# Patient Record
Sex: MALE | Race: WHITE | NOT HISPANIC OR LATINO | Employment: FULL TIME | URBAN - METROPOLITAN AREA
[De-identification: names, ages, dates, MRNs, and addresses within clinical notes are randomized per-mention and may not be internally consistent; named-entity substitution may affect disease eponyms.]

---

## 2017-03-27 DIAGNOSIS — Z85.830 HISTORY OF EWING'S SARCOMA: ICD-10-CM

## 2017-03-27 DIAGNOSIS — E78.01 FAMILIAL HYPERCHOLESTEROLEMIA: ICD-10-CM

## 2017-03-27 DIAGNOSIS — F33.0 MAJOR DEPRESSIVE DISORDER, RECURRENT EPISODE, MILD (H): ICD-10-CM

## 2017-03-27 LAB
BASOPHILS # BLD AUTO: 0 10E9/L (ref 0–0.2)
BASOPHILS NFR BLD AUTO: 0.3 %
DIFFERENTIAL METHOD BLD: ABNORMAL
EOSINOPHIL # BLD AUTO: 0.1 10E9/L (ref 0–0.7)
EOSINOPHIL NFR BLD AUTO: 0.9 %
ERYTHROCYTE [DISTWIDTH] IN BLOOD BY AUTOMATED COUNT: 12.2 % (ref 10–15)
HCT VFR BLD AUTO: 40.2 % (ref 40–53)
HGB BLD-MCNC: 13.9 G/DL (ref 13.3–17.7)
LYMPHOCYTES # BLD AUTO: 1.6 10E9/L (ref 0.8–5.3)
LYMPHOCYTES NFR BLD AUTO: 24.1 %
MCH RBC QN AUTO: 32.3 PG (ref 26.5–33)
MCHC RBC AUTO-ENTMCNC: 34.6 G/DL (ref 31.5–36.5)
MCV RBC AUTO: 93 FL (ref 78–100)
MONOCYTES # BLD AUTO: 0.7 10E9/L (ref 0–1.3)
MONOCYTES NFR BLD AUTO: 9.6 %
NEUTROPHILS # BLD AUTO: 4.4 10E9/L (ref 1.6–8.3)
NEUTROPHILS NFR BLD AUTO: 65.1 %
PLATELET # BLD AUTO: 221 10E9/L (ref 150–450)
RBC # BLD AUTO: 4.31 10E12/L (ref 4.4–5.9)
WBC # BLD AUTO: 6.8 10E9/L (ref 4–11)

## 2017-03-27 PROCEDURE — 80050 GENERAL HEALTH PANEL: CPT | Performed by: FAMILY MEDICINE

## 2017-03-27 PROCEDURE — 36415 COLL VENOUS BLD VENIPUNCTURE: CPT | Performed by: FAMILY MEDICINE

## 2017-03-27 PROCEDURE — 80061 LIPID PANEL: CPT | Performed by: FAMILY MEDICINE

## 2017-03-28 LAB
ALBUMIN SERPL-MCNC: 4.8 G/DL (ref 3.4–5)
ALP SERPL-CCNC: 49 U/L (ref 40–150)
ALT SERPL W P-5'-P-CCNC: 34 U/L (ref 0–70)
ANION GAP SERPL CALCULATED.3IONS-SCNC: 14 MMOL/L (ref 3–14)
AST SERPL W P-5'-P-CCNC: 17 U/L (ref 0–45)
BILIRUB SERPL-MCNC: 0.5 MG/DL (ref 0.2–1.3)
BUN SERPL-MCNC: 20 MG/DL (ref 7–30)
CALCIUM SERPL-MCNC: 9.4 MG/DL (ref 8.5–10.1)
CHLORIDE SERPL-SCNC: 105 MMOL/L (ref 94–109)
CHOLEST SERPL-MCNC: 247 MG/DL
CO2 SERPL-SCNC: 22 MMOL/L (ref 20–32)
CREAT SERPL-MCNC: 1.12 MG/DL (ref 0.66–1.25)
GFR SERPL CREATININE-BSD FRML MDRD: 76 ML/MIN/1.7M2
GLUCOSE SERPL-MCNC: 68 MG/DL (ref 70–99)
HDLC SERPL-MCNC: 56 MG/DL
LDLC SERPL CALC-MCNC: 157 MG/DL
NONHDLC SERPL-MCNC: 191 MG/DL
POTASSIUM SERPL-SCNC: 3.8 MMOL/L (ref 3.4–5.3)
PROT SERPL-MCNC: 8 G/DL (ref 6.8–8.8)
SODIUM SERPL-SCNC: 141 MMOL/L (ref 133–144)
TRIGL SERPL-MCNC: 171 MG/DL
TSH SERPL DL<=0.005 MIU/L-ACNC: 1.52 MU/L (ref 0.4–4)

## 2017-03-29 ENCOUNTER — MYC MEDICAL ADVICE (OUTPATIENT)
Dept: FAMILY MEDICINE | Facility: CLINIC | Age: 32
End: 2017-03-29

## 2017-03-29 NOTE — PROGRESS NOTES
Dear Agapito,    Your recent labs were normal, other than elevated LDL cholesterol, triglycerides, and total cholesterol. Have you been taking your Lipitor daily? Are you in need of refills on your medication?  A heart-healthy diet, regular exercise, and weight control is recommended in addition to your cholesterol medication.    Please contact the clinic if you have additional questions.  Thank you.    Sincerely,    Sophia Ch PA-C  Physician extender for Dr. Karen Weiler

## 2017-04-10 ENCOUNTER — MYC MEDICAL ADVICE (OUTPATIENT)
Dept: FAMILY MEDICINE | Facility: CLINIC | Age: 32
End: 2017-04-10

## 2017-04-10 DIAGNOSIS — R21 RASH: Primary | ICD-10-CM

## 2017-05-09 DIAGNOSIS — E78.01 FAMILIAL HYPERCHOLESTEROLEMIA: Primary | ICD-10-CM

## 2017-05-09 NOTE — TELEPHONE ENCOUNTER
atorvastatin (LIPITOR) 40 MG tablet     Last Written Prescription Date: 11/11/2016  Last Fill Quantity: ?, # refills: 3  Last Office Visit with G, P or [facility] prescribing provider: 12/8/2016  Next 5 appointments (look out 90 days)     May 20, 2017  9:00 AM CDT   Office Visit with Cristiana Hernandez MD   [facility] - [facility] Skin ([facility] )    [address] [place] [id number] [phone]                   Lab Results   Component Value Date    CHOL 247 03/27/2017     Lab Results   Component Value Date    HDL 56 03/27/2017     Lab Results   Component Value Date     03/27/2017     Lab Results   Component Value Date    TRIG 171 03/27/2017     No results found for: CHOLHDLRATIO    Routing refill request to provider for review/approval because:  Medication is reported/historical

## 2017-05-10 RX ORDER — ATORVASTATIN CALCIUM 40 MG/1
40 TABLET, FILM COATED ORAL DAILY
Qty: 90 TABLET | Refills: 3 | Status: SHIPPED | OUTPATIENT
Start: 2017-05-10 | End: 2018-05-30

## 2017-05-20 ENCOUNTER — OFFICE VISIT (OUTPATIENT)
Dept: FAMILY MEDICINE | Facility: CLINIC | Age: 32
End: 2017-05-20
Payer: COMMERCIAL

## 2017-05-20 DIAGNOSIS — L30.9 DERMATITIS: ICD-10-CM

## 2017-05-20 DIAGNOSIS — L30.9 ECZEMA, UNSPECIFIED TYPE: Primary | ICD-10-CM

## 2017-05-20 LAB
KOH PREP SPEC: NORMAL
MICRO REPORT STATUS: NORMAL
SPECIMEN SOURCE: NORMAL

## 2017-05-20 PROCEDURE — 87220 TISSUE EXAM FOR FUNGI: CPT | Performed by: FAMILY MEDICINE

## 2017-05-20 PROCEDURE — 99214 OFFICE O/P EST MOD 30 MIN: CPT | Performed by: FAMILY MEDICINE

## 2017-05-20 RX ORDER — BETAMETHASONE DIPROPIONATE 0.05 %
OINTMENT (GRAM) TOPICAL
Qty: 45 G | Refills: 0 | Status: SHIPPED | OUTPATIENT
Start: 2017-05-20 | End: 2017-11-22

## 2017-05-20 NOTE — MR AVS SNAPSHOT
After Visit Summary   5/20/2017    Agapito Yu    MRN: [id number]           Patient Information     Date Of Birth          1985        Visit Information        Provider Department      5/20/2017 9:00 AM Cristiana Hernandez MD [facility]        Today's Diagnoses     Eczema, unspecified type    -  1    Dermatitis          Care Instructions    DRY SKIN INSTRUCTIONS  Routine use of moisturizer is important for healthy, resilient skin.    Twice daily use of a moisturizer such as over-the-counter (OTC) CeraVe moisturizer cream (in the jar) or OTC Cetaphil RestoraDerm moisturizer. These contain ceramides and filaggrin proteins that can help to maintain the body's moisture layer.    Twice daily use of a moisturizer such as OTC Vanicream or OTC Aquaphor.    Always apply moisturizer after washing, within 3 minutes of drying off for best effect.    Do not overuse soap. Just apply soap on the groin and armpits, unless you have been sweating extensively. Recommended products include OTC unscented Dove sensitive skin or OTC Vanicream cleansing bar.    Good facial cleansers include OTC CeraVe hydrating facial cleanser or OTC Cetaphil daily facial cleanser.    Avoid use of scented products and/or antistatic dryer sheets.  Betamethasone diproprinate 0.05% ointment - apply two times per day to the affected areas on the arms. Not for face or groin    Recheck if needed        Follow-ups after your visit        Who to contact     If you have questions or need follow up information about today's clinic visit or your schedule please contact [facility] SK[facility] directly at [phone].  Normal or non-critical lab and imaging results will be communicated to you by MyChart, letter or phone within 4 business days after the clinic has received the results. If you do not hear from us within 7 days, please contact the clinic through MyChart or phone. If you have a critical  or abnormal lab result, we will notify you by phone as soon as possible.  Submit refill requests through Zaiseoul or call your pharmacy and they will forward the refill request to us. Please allow 3 business days for your refill to be completed.          Additional Information About Your Visit        PeakÂ®hart Information     Zaiseoul gives you secure access to your electronic health record. If you see a primary care provider, you can also send messages to your care team and make appointments. If you have questions, please call your primary care clinic.  If you do not have a primary care provider, please call [phone] and they will assist you.        Care EveryWhere ID     This is your Care EveryWhere ID. This could be used by other organizations to access your [facility] medical records  [id number]         Blood Pressure from Last 3 Encounters:   12/08/16 112/60   11/28/16 108/72    Weight from Last 3 Encounters:   12/08/16 191 lb (86.6 kg)   11/28/16 191 lb 9.6 oz (86.9 kg)              We Performed the Following     KOH skin hair or nails          Today's Medication Changes          These changes are accurate as of: 5/20/17  9:29 AM.  If you have any questions, ask your nurse or doctor.               Start taking these medicines.        Dose/Directions    betamethasone dipropionate 0.05 % ointment   Commonly known as:  DIPROSONE   Used for:  Eczema, unspecified type   Started by:  Cristiana Hernandez MD        Apply sparingly to affected area on arms , legs or trunk  twice daily for 10-14 days  Do not apply to face.   Quantity:  45 g   Refills:  0            Where to get your medicines      These medications were sent to [facility] [address]  [place], [place] - [address]  [address] [facility] [address]     Phone:  [phone]     betamethasone dipropionate 0.05 % ointment                Primary Care Provider Office Phone # Fax #    Karen Weiler, [phone]       [place]  [facility] [address] [facility] [address]        Thank you!     Thank you for choosing [facility] PRIMARY CARE SK[facility]  for your care. Our goal is always to provide you with excellent care. Hearing back from our patients is one way we can continue to improve our services. Please take a few minutes to complete the written survey that you may receive in the mail after your visit with us. Thank you!             Your Updated Medication List - Protect others around you: Learn how to safely use, store and throw away your medicines at www.disposemymeds.org.          This list is accurate as of: 5/20/17  9:29 AM.  Always use your most recent med list.                   Brand Name Dispense Instructions for use    atorvastatin 40 MG tablet    LIPITOR    90 tablet    Take 1 tablet (40 mg) by mouth daily       augmented betamethasone dipropionate 0.05 % cream    DIPROLENE-AF    15 g    Apply sparingly to affected area  twice daily for 14 days.  Do not apply to face.       betamethasone dipropionate 0.05 % ointment    DIPROSONE    45 g    Apply sparingly to affected area on arms , legs or trunk  twice daily for 10-14 days  Do not apply to face.       clotrimazole 1 % cream    LOTRIMIN    15 g    Apply topically 2 times daily       VIAGRA 50 MG cap/tab   Generic drug:  sildenafil      TK SS TO ONE T PO PRN - ONE HOUR BEFORE SEXUAL ACTIVITY       * ZOLOFT PO      Take by mouth daily Probably is Zoloft       * sertraline 100 MG tablet    ZOLOFT    90 tablet    Take 1 tablet (100 mg) by mouth daily       * Notice:  This list has 2 medication(s) that are the same as other medications prescribed for you. Read the directions carefully, and ask your doctor or other care provider to review them with you.

## 2017-05-20 NOTE — PROGRESS NOTES
[facility] - PRIMARY CARE SKIN    CC : Rash   SUBJECTIVE:                                                    Agapito Yu is a 31 year old male who presents to clinic today with wife because of a(n) itchy rash beginning 6 months ago on the ams, and spreading to the chest wall.      Pruritic : itcy  Symptoms appear to be : not changing over the course of time.  Aggravating factors : none.  Relieving factors : betamethasone diproprinate 0.05% helped slightly - 2 weeks use once per day; previous antifungal cream- made no difference.    Previous similar history : No.  Recent exposure history : none known   Any other family members with similar symptoms : No.    Therapies tried : hydrocortisone   Products used : moisturizers , not used daily    Personal Medical History  Skin Cancer : NO  Eczema Psoriasis Rosacea Autoimmune   NO NO NO NO     Family Medical History  Skin Cancer : GM - nonmelanoma  Eczema Psoriasis Rosacea Autoimmune   NO NO NO NO     Occupation : IT (    Patient Active Problem List   Diagnosis     Familial hypercholesterolemia     History of Parker's sarcoma     Major depressive disorder, recurrent episode, mild (H)       History reviewed. No pertinent past medical history. Past Surgical History:   Procedure Laterality Date     SURGICAL HISTORY OF -       Rt. 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      Social History   Substance Use Topics     Smoking status: Never Smoker     Smokeless tobacco: Never Used     Alcohol use 0.0 oz/week     0 Standard drinks or equivalent per week      Comment: 12 drinks per week     Family History     Problem (# of Occurrences) Relation (Name,Age of Onset)    Hyperlipidemia (3) Father, Brother, Brother           Prescription Medications as of 5/20/2017             atorvastatin (LIPITOR) 40 MG tablet Take 1 tablet (40 mg) by mouth daily    VIAGRA 50 MG cap/tab TK SS TO ONE T PO PRN - ONE HOUR BEFORE SEXUAL ACTIVITY     sertraline (ZOLOFT) 100 MG tablet Take 1 tablet (100 mg) by mouth daily    Sertraline HCl (ZOLOFT PO) Take by mouth daily Probably is Zoloft    augmented betamethasone dipropionate (DIPROLENE-AF) 0.05 % cream Apply sparingly to affected area  twice daily for 14 days.  Do not apply to face.    clotrimazole (LOTRIMIN) 1 % cream Apply topically 2 times daily          No Known Allergies         ROS : 14 point review of systems was negative except the symptoms listed above in the HPI.    This document serves as a record of the services and decisions personally performed and made by Katie Hernandez MD. It was created on her behalf by Reid Hernandez, a trained medical scribe.  The creation of this document is based on the scribe's personal observations and the provider's statements to the medical scribe.  Reid Hernandez, May 20, 2017 8:57 AM      OBJECTIVE:                                                    GENERAL: healthy, alert and no distress  SKIN: Lopez Skin Type - III.  Face, Neck, Trunk, Arms, Hands and Fingers were examined. The dermatoscope was used to help evaluate pigmented lesions.  Skin Pertinent Findings:  Arms, lateral chest wall- scattered erythematous maculopapular eruption patches, scaly                          Face, legs, trunk- clear  Diagnostic Test Results:  Results for orders placed or performed in visit on 05/20/17 (from the past 24 hour(s))   KOH skin hair or nails   Result Value Ref Range    Specimen Description Skin     KOH Skin Hair Nails Test No fungal elements seen     Micro Report Status FINAL 05/20/2017              ASSESSMENT:                                                      Encounter Diagnoses   Name Primary?     Eczema, unspecified type Yes     Dermatitis            PLAN:                                                    Patient Instructions   DRY SKIN INSTRUCTIONS  Routine use of moisturizer is important for healthy, resilient skin.    Twice daily use of a moisturizer such as  over-the-counter (OTC) CeraVe moisturizer cream (in the jar) or OTC Cetaphil RestoraDerm moisturizer. These contain ceramides and filaggrin proteins that can help to maintain the body's moisture layer.    Twice daily use of a moisturizer such as OTC Vanicream or OTC Aquaphor.    Always apply moisturizer after washing, within 3 minutes of drying off for best effect.    Do not overuse soap. Just apply soap on the groin and armpits, unless you have been sweating extensively. Recommended products include OTC unscented Dove sensitive skin or OTC Vanicream cleansing bar.    Good facial cleansers include OTC CeraVe hydrating facial cleanser or OTC Cetaphil daily facial cleanser.    Avoid use of scented products and/or antistatic dryer sheets.  Betamethasone diproprinate 0.05% ointment - apply two times per day to the affected areas on the arms. Not for face or groin    Recheck if needed        The patient was counseled to use products free of fragrance, dyes, and plants. The importance of using bland cleansers and the regular use of heavy bland emollient creams was impressed upon the patient.      PROCEDURES:                                                    None.    TT : 25 minutes.  CT : 20 minutes.      The information in this document, created by the medical scribe for me, accurately reflects the services I personally performed and the decisions made by me. I have reviewed and approved this document for accuracy prior to leaving the patient care area.  Katie Hernandez MD May 20, 2017 8:57 AM  [facility] - PRIMARY CARE SKIN

## 2017-05-20 NOTE — PATIENT INSTRUCTIONS
DRY SKIN INSTRUCTIONS  Routine use of moisturizer is important for healthy, resilient skin.    Twice daily use of a moisturizer such as over-the-counter (OTC) CeraVe moisturizer cream (in the jar) or OTC Cetaphil RestoraDerm moisturizer. These contain ceramides and filaggrin proteins that can help to maintain the body's moisture layer.    Twice daily use of a moisturizer such as OTC Vanicream or OTC Aquaphor.    Always apply moisturizer after washing, within 3 minutes of drying off for best effect.    Do not overuse soap. Just apply soap on the groin and armpits, unless you have been sweating extensively. Recommended products include OTC unscented Dove sensitive skin or OTC Vanicream cleansing bar.    Good facial cleansers include OTC CeraVe hydrating facial cleanser or OTC Cetaphil daily facial cleanser.    Avoid use of scented products and/or antistatic dryer sheets.  Betamethasone diproprinate 0.05% ointment - apply two times per day to the affected areas on the arms. Not for face or groin    Recheck if needed

## 2017-06-21 ENCOUNTER — NURSE TRIAGE (OUTPATIENT)
Dept: NURSING | Facility: CLINIC | Age: 32
End: 2017-06-21

## 2017-06-21 ENCOUNTER — MYC MEDICAL ADVICE (OUTPATIENT)
Dept: FAMILY MEDICINE | Facility: CLINIC | Age: 32
End: 2017-06-21

## 2017-06-21 DIAGNOSIS — G47.09 OTHER INSOMNIA: Primary | ICD-10-CM

## 2017-06-21 RX ORDER — ZOLPIDEM TARTRATE 10 MG/1
10 TABLET ORAL
Qty: 30 TABLET | Refills: 1 | Status: SHIPPED | OUTPATIENT
Start: 2017-06-21 | End: 2017-09-11

## 2017-06-21 NOTE — TELEPHONE ENCOUNTER
"  Reason for Disposition    [1] Prescription not at pharmacy AND [2] was prescribed today by PCP     [facility] pharmacy calling to check on Ambien Rx.  Advised that there is an Rx written by Dr. Weiler today and documentation indicating that she called into [facility] pharmacy.    Pharmacist Maddie states they have not received a verbal order for the Ambien from Dr. Weiler.  I inquired if there is possibly a doctor line that messages have not been checked yet.  Pharmacist will check doctor line.  Pharmacist is unable to take verbal from this RN as I do not have a [facility] number for Dr. Weiler.  If no voice message left from Dr. Weiler with verbal order, pharmacist will contact clinic in morning.    Additional Information    Negative: Drug overdose and nurse unable to answer question    Negative: Caller requesting information not related to medicine    Negative: Caller requesting a prescription for Strep throat and has a positive culture result    Negative: Rash while taking a medication or within 3 days of stopping it    Negative: Immunization reaction suspected    Negative: [1] Asthma and [2] having symptoms of asthma (cough, wheezing, etc)    Negative: MORE THAN A DOUBLE DOSE of a prescription or over-the-counter (OTC) drug    Negative: [1] DOUBLE DOSE (an extra dose or lesser amount) of over-the-counter (OTC) drug AND [2] any symptoms (e.g., dizziness, nausea, pain, sleepiness)    Negative: [1] DOUBLE DOSE (an extra dose or lesser amount) of prescription drug AND [2] any symptoms (e.g., dizziness, nausea, pain, sleepiness)    Negative: Took another person's prescription drug    Negative: [1] DOUBLE DOSE (an extra dose or lesser amount) of prescription drug AND [2] NO symptoms (Exception: a double dose of antibiotics)    Negative: Diabetes drug error or overdose (e.g., insulin or extra dose)    Negative: [1] Request for URGENT new prescription or refill of \"essential\" medication (i.e., likelihood of harm to " patient if not taken) AND [2] triager unable to fill per unit policy    Protocols used: MEDICATION QUESTION CALL-ADULT-      Krysta Cat RN  [facility]

## 2017-06-22 ENCOUNTER — TELEPHONE (OUTPATIENT)
Dept: FAMILY MEDICINE | Facility: CLINIC | Age: 32
End: 2017-06-22

## 2017-06-22 NOTE — TELEPHONE ENCOUNTER
[facility] calling asking about the ambien script (where is it?)  - pierre RUBIN just faxed it over     Advised of the above     Stephanie Drew RN, BSN  [facility]

## 2017-09-11 DIAGNOSIS — G47.09 OTHER INSOMNIA: ICD-10-CM

## 2017-09-13 RX ORDER — ZOLPIDEM TARTRATE 10 MG/1
10 TABLET ORAL
Qty: 30 TABLET | Refills: 1 | Status: SHIPPED | OUTPATIENT
Start: 2017-09-13 | End: 2017-11-22

## 2017-10-31 ENCOUNTER — MYC MEDICAL ADVICE (OUTPATIENT)
Dept: FAMILY MEDICINE | Facility: CLINIC | Age: 32
End: 2017-10-31

## 2017-10-31 DIAGNOSIS — R07.9 CHEST PAIN, UNSPECIFIED TYPE: ICD-10-CM

## 2017-10-31 DIAGNOSIS — Z92.21 STATUS POST CHEMOTHERAPY: Primary | ICD-10-CM

## 2017-11-01 NOTE — TELEPHONE ENCOUNTER
Dr. Weiler please see below and advise. Please advise if you would prefer virtual visit or other. Thank you, Shaista Holm R.N.

## 2017-11-16 ENCOUNTER — HOSPITAL ENCOUNTER (OUTPATIENT)
Dept: CARDIOLOGY | Facility: CLINIC | Age: 32
Discharge: HOME | End: 2017-11-16
Attending: FAMILY MEDICINE | Admitting: FAMILY MEDICINE
Payer: COMMERCIAL

## 2017-11-16 DIAGNOSIS — R07.9 CHEST PAIN, UNSPECIFIED TYPE: ICD-10-CM

## 2017-11-16 DIAGNOSIS — Z92.21 STATUS POST CHEMOTHERAPY: ICD-10-CM

## 2017-11-16 PROCEDURE — 93306 TTE W/DOPPLER COMPLETE: CPT | Mod: 26 | Performed by: INTERNAL MEDICINE

## 2017-11-16 PROCEDURE — 25500064 ZZH RX 255 OP 636: Performed by: FAMILY MEDICINE

## 2017-11-16 PROCEDURE — 40000264 ECHO COMPLETE WITH OPTISON

## 2017-11-16 RX ADMIN — HUMAN ALBUMIN MICROSPHERES AND PERFLUTREN 5 ML: 10; .22 INJECTION, SOLUTION INTRAVENOUS at 08:45

## 2017-11-17 ENCOUNTER — MYC MEDICAL ADVICE (OUTPATIENT)
Dept: FAMILY MEDICINE | Facility: CLINIC | Age: 32
End: 2017-11-17

## 2017-11-17 DIAGNOSIS — R93.1 ABNORMAL ECHOCARDIOGRAM: ICD-10-CM

## 2017-11-17 DIAGNOSIS — Z85.830 HISTORY OF EWING'S SARCOMA: Primary | ICD-10-CM

## 2017-11-21 NOTE — TELEPHONE ENCOUNTER
Spoke to patient. Discussed echo results. Echo showed reduced ejection fraction of 40-45%. Recommended follow-up with Cardio-Oncology clinic.  Referral done. Patient to call and make an appointment.    Karen Weiler, MD

## 2017-11-22 ENCOUNTER — HOSPITAL ENCOUNTER (OUTPATIENT)
Dept: CT IMAGING | Facility: CLINIC | Age: 32
Discharge: HOME | End: 2017-11-22
Attending: SURGERY | Admitting: SURGERY
Payer: COMMERCIAL

## 2017-11-22 ENCOUNTER — OFFICE VISIT (OUTPATIENT)
Dept: SURGERY | Facility: CLINIC | Age: 32
End: 2017-11-22
Payer: COMMERCIAL

## 2017-11-22 ENCOUNTER — OFFICE VISIT (OUTPATIENT)
Dept: FAMILY MEDICINE | Facility: CLINIC | Age: 32
End: 2017-11-22
Payer: COMMERCIAL

## 2017-11-22 VITALS
WEIGHT: 201 LBS | HEART RATE: 100 BPM | BODY MASS INDEX: 29.77 KG/M2 | TEMPERATURE: 98.1 F | OXYGEN SATURATION: 100 % | DIASTOLIC BLOOD PRESSURE: 86 MMHG | SYSTOLIC BLOOD PRESSURE: 128 MMHG | HEIGHT: 69 IN

## 2017-11-22 VITALS
HEIGHT: 69 IN | DIASTOLIC BLOOD PRESSURE: 76 MMHG | HEART RATE: 85 BPM | BODY MASS INDEX: 29.77 KG/M2 | SYSTOLIC BLOOD PRESSURE: 116 MMHG | WEIGHT: 201 LBS

## 2017-11-22 DIAGNOSIS — F32.A MILD DEPRESSION: ICD-10-CM

## 2017-11-22 DIAGNOSIS — R10.32 LEFT GROIN PAIN: Primary | ICD-10-CM

## 2017-11-22 DIAGNOSIS — R10.32 LEFT GROIN PAIN: ICD-10-CM

## 2017-11-22 DIAGNOSIS — Z51.81 MEDICATION MONITORING ENCOUNTER: ICD-10-CM

## 2017-11-22 DIAGNOSIS — K40.90 LEFT INGUINAL HERNIA: Primary | ICD-10-CM

## 2017-11-22 DIAGNOSIS — Z85.830 HISTORY OF EWING'S SARCOMA: ICD-10-CM

## 2017-11-22 PROCEDURE — 99214 OFFICE O/P EST MOD 30 MIN: CPT | Performed by: FAMILY MEDICINE

## 2017-11-22 PROCEDURE — 99204 OFFICE O/P NEW MOD 45 MIN: CPT | Performed by: SURGERY

## 2017-11-22 PROCEDURE — 25000125 ZZHC RX 250: Performed by: SURGERY

## 2017-11-22 PROCEDURE — 74177 CT ABD & PELVIS W/CONTRAST: CPT

## 2017-11-22 PROCEDURE — 25000128 H RX IP 250 OP 636: Performed by: SURGERY

## 2017-11-22 RX ORDER — CEPHALEXIN 500 MG/1
500 CAPSULE ORAL 3 TIMES DAILY
Qty: 30 CAPSULE | Refills: 0 | Status: SHIPPED | OUTPATIENT
Start: 2017-11-22 | End: 2017-12-02

## 2017-11-22 RX ORDER — IOPAMIDOL 755 MG/ML
98 INJECTION, SOLUTION INTRAVASCULAR ONCE
Status: COMPLETED | OUTPATIENT
Start: 2017-11-22 | End: 2017-11-22

## 2017-11-22 RX ADMIN — SODIUM CHLORIDE 69 ML: 9 INJECTION, SOLUTION INTRAVENOUS at 15:27

## 2017-11-22 RX ADMIN — IOPAMIDOL 98 ML: 755 INJECTION, SOLUTION INTRAVENOUS at 15:26

## 2017-11-22 NOTE — NURSING NOTE
Patient returned after CT scan.  Informed pt of results and Dr. Devine's recommendations to follow up with PCP.  No evidence of hernia.  Some inflammation, possible infection.  Order for a 10 day course of keflex sent to patient's pharmacy of choice.    IV removed without any complications prior to patient leaving the clinic.    Miracle Manzanares RN

## 2017-11-22 NOTE — MR AVS SNAPSHOT
After Visit Summary   11/22/2017    Agapito Yu    MRN: [id number]           Patient Information     Date Of Birth          1985        Visit Information        Provider Department      11/22/2017 1:30 PM Levi Devine MD [facility]      Today's Diagnoses     Left groin pain    -  1      Care Instructions    Your CT abd/pelvis is scheduled for today at 3:00 pm          Follow-ups after your visit        Your next 10 appointments already scheduled     Nov 22, 2017  3:00 PM CST   CT ABDOMEN PELVIS W/O CONTRAST with SHCT1   [facility] ([facility])    [address] [id number] [phone]           Please bring any scans or X-rays taken at other hospitals, if similar tests were done. Also bring a list of your medicines, including vitamins, minerals and over-the-counter drugs. It is safest to leave personal items at home.  Be sure to tell your doctor:   If you have any allergies.   If there s any chance you are pregnant.   If you are breastfeeding.   If you have any special needs.  You may have contrast for this exam. To prepare:   Do not eat or drink for 2 hours before your exam. If you need to take medicine, you may take it with small sips of water. (We may ask you to take liquid medicine as well.)   The day before your exam, drink extra fluids at least six 8-ounce glasses (unless your doctor tells you to restrict your fluids).  Patients over 70 or patients with diabetes or kidney problems:   If you haven t had a blood test (creatinine test) within the last 30 days, go to your clinic or Diagnostic Imaging Department for this test.  If you have diabetes:   If your kidney function is normal, continue taking your metformin (Avandamet, Glucophage, Glucovance, Metaglip) on the day of your exam.   If your kidney function is abnormal, wait 48 hours before restarting this medicine.  You  will have oral contrast for this exam:   You will drink the contrast at home. Get this from your clinic or Diagnostic Imaging Department. Please follow the directions given.  Please wear loose clothing, such as a sweat suit or jogging clothes. Avoid snaps, zippers and other metal. We may ask you to undress and put on a hospital gown.  If you have any questions, please call the Imaging Department where you will have your exam.            Dec 07, 2017 11:15 AM CST   Cardio-Oncology New with Maia Hernandez DO   [facility] ([facility])    [address]  [facility] [id number] [phone]              Future tests that were ordered for you today     Open Future Orders        Priority Expected Expires Ordered    CT Abdomen Pelvis w/o Contrast STAT 11/22/2017 11/22/2018 11/22/2017            Who to contact     If you have questions or need follow up information about today's clinic visit or your schedule please contact [facility] directly at [phone].  Normal or non-critical lab and imaging results will be communicated to you by HIT Communityhart, letter or phone within 4 business days after the clinic has received the results. If you do not hear from us within 7 days, please contact the clinic through Axiomaticst or phone. If you have a critical or abnormal lab result, we will notify you by phone as soon as possible.  Submit refill requests through CardStar or call your pharmacy and they will forward the refill request to us. Please allow 3 business days for your refill to be completed.          Additional Information About Your Visit        CardStar Information     CardStar gives you secure access to your electronic health record. If you see a primary care provider, you can also send messages to your care team and make appointments. If you have questions, please call your primary care clinic.  If you do not have a primary care provider,  "please call [phone] and they will assist you.        Care EveryWhere ID     This is your Care EveryWhere ID. This could be used by other organizations to access your [place] medical records  [id number]        Your Vitals Were     Pulse Height BMI (Body Mass Index)             85 5' 8.75\" (1.746 m) 29.9 kg/m2          Blood Pressure from Last 3 Encounters:   11/22/17 116/76   11/22/17 128/86   12/08/16 112/60    Weight from Last 3 Encounters:   11/22/17 201 lb (91.2 kg)   11/22/17 201 lb (91.2 kg)   12/08/16 191 lb (86.6 kg)                 Today's Medication Changes          These changes are accurate as of: 11/22/17  2:07 PM.  If you have any questions, ask your nurse or doctor.               These medicines have changed or have updated prescriptions.        Dose/Directions    sertraline 100 MG tablet   Commonly known as:  ZOLOFT   This may have changed:  Another medication with the same name was removed. Continue taking this medication, and follow the directions you see here.   Used for:  Major depressive disorder, recurrent episode, mild (H)   Changed by:  Weiler, Karen, MD        Dose:  100 mg   Take 1 tablet (100 mg) by mouth daily   Quantity:  90 tablet   Refills:  3                Primary Care Provider Office Phone # Fax #    Ed Flores [phone] [address] [facility] [address]        Equal Access to Services     [facility] AH: Hadii kevon zamora hadasho Soeric, waaxda luqadaha, qaybta kaalmada shalaegyada, roger edouard. So [facility] [phone].    ATENCIÓN: Si habla español, tiene a berger disposición servicios gratuitos de asistencia lingüística. Daniel al [phone].    We comply with applicable federal civil rights laws and [place] laws. We do not discriminate on the basis of race, color, national origin, age, disability, sex, sexual orientation, or gender identity.            Thank you!     Thank you for choosing [facility]" for your care. Our goal is always to provide you with excellent care. Hearing back from our patients is one way we can continue to improve our services. Please take a few minutes to complete the written survey that you may receive in the mail after your visit with us. Thank you!             Your Updated Medication List - Protect others around you: Learn how to safely use, store and throw away your medicines at www.disposemymeds.org.          This list is accurate as of: 11/22/17  2:07 PM.  Always use your most recent med list.                   Brand Name Dispense Instructions for use Diagnosis    atorvastatin 40 MG tablet    LIPITOR    90 tablet    Take 1 tablet (40 mg) by mouth daily    Familial hypercholesterolemia       sertraline 100 MG tablet    ZOLOFT    90 tablet    Take 1 tablet (100 mg) by mouth daily    Major depressive disorder, recurrent episode, mild (H)       VIAGRA 50 MG tablet   Generic drug:  sildenafil      TK SS TO ONE T PO PRN - ONE HOUR BEFORE SEXUAL ACTIVITY

## 2017-11-22 NOTE — PROGRESS NOTES
SUBJECTIVE:   Agapito Yu is a 32 year old male who presents to clinic today for the following health issues:    ABDOMINAL PAIN     Onset: 3 days    Description:   Character: throbbing  Location: right lower quadrant- pain when pressure -  left lower quadrant - swelling  Radiation: None    Intensity: 3/10    Progression of Symptoms:  worsening    Accompanying Signs & Symptoms:  Fever/Chills?: YES- chills yesterday  Gas/Bloating: no   Nausea: YES  Vomitting: no   Diarrhea?: no   Constipation:YES  Dysuria or Hematuria: no    History:   Trauma: YES- was lifting turkey out of water bath and playing/lifting with kids   Previous similar pain: YES- right testicle has been removed 1997   Previous tests done: none    Precipitating factors:   Does the pain change with:     Food: no      BM: no     Urination: no     Alleviating factors:  Laying flat    Therapies Tried and outcome: ibuprofen    LMP:  not applicable     Agapito reported abdominal pain with an onset of 3 days ago, which occurred with lifting heavy turkey. 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     He complained of gassiness and difficulties passing gas. Denied diarrhea or constipation.     He also feels he has been having chills.     Denied heartburn, nausea, bladder incontinence.     Anxiety/Depression -- Improved with use of Zoloft 100 mg daily.     Lipids -- Lipitor 40 mg daily.     Recent Labs   Lab Test  03/27/17   1316   CHOL  247*   HDL  56   LDL  157*   TRIG  171*       Past/recent records reviewed and discussed for -- family hx, social hx, surgical hx, medications, immunizations, allergies.      Problem list and histories reviewed & adjusted, as indicated.  Additional history: as documented    Reviewed and updated as needed this visit by clinical  staff  Tobacco  Allergies  Meds  Med Hx  Surg Hx  Fam Hx  Soc Hx      Reviewed and updated as needed this visit by Provider       BP Readings from Last 3 Encounters:   11/22/17 116/76   11/22/17 128/86   12/08/16 112/60     Wt Readings from Last 4 Encounters:   11/22/17 201 lb (91.2 kg)   11/22/17 201 lb (91.2 kg)   12/08/16 191 lb (86.6 kg)   11/28/16 191 lb 9.6 oz (86.9 kg)       Health Maintenance    Health Maintenance Due   Topic Date Due     DEPRESSION ACTION PLAN Q1 YR  11/09/2003     PHQ-9 Q6 MONTHS  11/09/2003       Current Problem List    Patient Active Problem List   Diagnosis     Familial hypercholesterolemia     History of Parker's sarcoma     Major depressive disorder, recurrent episode, mild (H)     Parker's sarcoma (H)     Mild depression (H)     Hyperlipidemia LDL goal <130       Past Medical History    Past Medical History:   Diagnosis Date     Parker's sarcoma (H) 1997    [place] - Rt Hip     Hyperlipidemia LDL goal <130      Mild depression (H)        Past Surgical History    Past Surgical History:   Procedure Laterality Date     DENTAL SURGERY  2001    wisdom teeth     SURGICAL HISTORY OF -   1997    Rt Hip/Ischium/Testicle removal - Parker's       Current Medications    Current Outpatient Prescriptions   Medication Sig Dispense Refill     atorvastatin (LIPITOR) 40 MG tablet Take 1 tablet (40 mg) by mouth daily 90 tablet 3     VIAGRA 50 MG cap/tab TK SS TO ONE T PO PRN - ONE HOUR BEFORE SEXUAL ACTIVITY  0     sertraline (ZOLOFT) 100 MG tablet Take 1 tablet (100 mg) by mouth daily 90 tablet 3     cephALEXin (KEFLEX) 500 MG capsule Take 1 capsule (500 mg) by mouth 3 times daily for 10 days 30 capsule 0       Allergies    No Known Allergies    Immunizations    Immunization History   Administered Date(s) Administered     Influenza (IIV3) PF 09/09/2016     Influenza Vaccine IM 3yrs+ 4 Valent IIV4 10/01/2017     TDAP Vaccine (Adacel) 12/08/2016       Family History    Family History   Problem  "Relation Age of Onset     Hyperlipidemia Father      Hyperlipidemia Brother      Colon Cancer Paternal Grandmother        Social History    Social History     Social History     Marital status:      Spouse name: Toshia     Number of children: 0     Years of education: N/A     Occupational History     Not on file.     Social History Main Topics     Smoking status: Never Smoker     Smokeless tobacco: Never Used     Alcohol use 7.2 - 10.8 oz/week     12 - 18 Standard drinks or equivalent per week      Comment: 12-18 drinks per week      Drug use: Yes     Special: Marijuana      Comment: [email or web address]      Sexual activity: Yes     Partners: Female     Birth control/ protection: None     Other Topics Concern     Parent/Sibling W/ Cabg, Mi Or Angioplasty Before 65f 55m? No     Social History Narrative       All above reviewed and updated, all stable unless otherwise noted    Recent labs reviewed    ROS:  Constitutional, HEENT, cardiovascular, pulmonary, GI, , musculoskeletal, neuro, skin, endocrine and psych systems are negative, except as in HPI or otherwise noted     This document serves as a record of the services and decisions personally performed and made by Ed Flores MD [facility]. It was created on their behalf by Chacorta Dalton, a trained medical scribe. The creation of this document is based the provider's statements to the medical scribe.  Chacorta Dalton November 22, 2017 11:34 AM      OBJECTIVE:                                                    /86  Pulse 100  Temp 98.1  F (36.7  C) (Oral)  Ht 5' 8.75\" (1.746 m)  Wt 201 lb (91.2 kg)  SpO2 100%  BMI 29.9 kg/m2  Body mass index is 29.9 kg/(m^2).  GENERAL: healthy, alert and no distress, overweight  HENT: ear canals and TM's normal upon viewing with otoscope, nose and mouth without ulcers or lesions upon viewing with otoscope  RESP: lungs clear to auscultation - no rales, no rhonchi, no wheezes  CV: regular rates and rhythm, normal S1 S2, no S3 or S4 and no " murmur, no click or rub -  ABDOMEN: tenderness noted to palpation of lower abdomen, no  hepatosplenomegaly, no masses, normal bowel sounds  : penis normal without urethral discharge, testicles at baseline (one implant present), significant tenderness/enlargement to palpation of bilateral inguinal regions, with noted left inguinal hernia  SKIN: no suspicious lesions, no rashes to visible skin  NEURO: mentation intact and speech normal  PSYCH: affect normal/bright    DIAGNOSTICS/PROCEDURES:                                                      Results for orders placed or performed during the hospital encounter of 17   ECHO COMPLETE WITH OPTISON    Narrative    [id number]  [id number]  [id number]  [id number]^WEILER^ANGELIC^           [facility]  Echocardiography Laboratory  [address] [place] [address]        Name: ANETTE SPANN  MRN: [id number]  : 1985  Study Date: 2017 07:58 AM  Age: 32 yrs  Gender: Male  [place] [facility] [place]  Reason For Study: Status post chemotherapy, Chest pain, unspecified type  Ordering Physician: WEILER, KAREN  Referring Physician: WEILER, KAREN  Performed By: Tavares Mcneal RDCS     BSA: 2.0 m2  Height: 69 in  Weight: 191 lb  HR: 71  BP: 130/86 mmHg  _____________________________________________________________________________  __        Procedure  Complete Echo Adult. Contrast Optison.  _____________________________________________________________________________  __        Interpretation Summary     Left ventricular systolic function is mildly reduced.  The visual ejection fraction is estimated at 40-45%.  Suggest fu in cardio-oncology clinic if clinically appropriate. The study was  technically difficult.  _____________________________________________________________________________  __        Left Ventricle  The left ventricle is normal in size. There is normal left ventricular wall  thickness. Left ventricular systolic function is mildly reduced. E  by E prime  ratio is between 8 and 15, which is indeterminate for assessment of left  ventricular filling pressures. The visual ejection fraction is estimated at  40-45%. There is mild global hypokinesia of the left ventricle.     Right Ventricle  The right ventricle is normal size. The right ventricular systolic function is  normal.     Atria  Normal left atrial size. Right atrial size is normal. There is no color  Doppler evidence of an atrial shunt.     Mitral Valve  There is trace mitral regurgitation.        Tricuspid Valve  There is trace tricuspid regurgitation. Right ventricular systolic pressure  could not be approximated due to inadequate tricuspid regurgitation. Normal  IVC (1.5-2.5cm) with >50% respiratory collapse; right atrial pressure is  estimated at 5-10mmHg.     Aortic Valve  The aortic valve is trileaflet. No aortic regurgitation is present. No aortic  stenosis is present.     Pulmonic Valve  Normal pulmonic valve.     Vessels  The aortic root is normal size.     Pericardium  There is no pericardial effusion.        Rhythm  Sinus rhythm was noted.  _____________________________________________________________________________  __  MMode/2D Measurements & Calculations  IVSd: 0.66 cm     LVIDd: 4.8 cm  LVIDs: 3.4 cm  LVPWd: 0.64 cm  FS: 28.9 %  EDV(Teich): 109.7 ml  ESV(Teich): 48.8 ml  LV mass(C)d: 98.5 grams  LV mass(C)dI: 48.6 grams/m2  Ao root diam: 3.0 cm  LA dimension: 3.9 cm  asc Aorta Diam: 2.8 cm  LA/Ao: 1.3  LVOT diam: 2.1 cm  LVOT area: 3.5 cm2  LA Volume (BP): 59.0 ml  LA Volume Index (BP): 29.1 ml/m2  RWT: 0.26           Doppler Measurements & Calculations  MV E max jef: 59.0 cm/sec  MV A max jef: 55.9 cm/sec  MV E/A: 1.1  MV dec time: 0.21 sec  LV V1 max PG: 3.6 mmHg  LV V1 max: 95.3 cm/sec  LV V1 VTI: 19.3 cm  SV(LVOT): 67.8 ml  SI(LVOT): 33.5 ml/m2  TV V2 max: 217.2 cm/sec  TV max P.9 mmHg  Pulm Sys Jef: 46.6 cm/sec  Pulm Mg Jef: 32.9 cm/sec  Pulm S/D: 1.4  E/E' avg:  10.4  Lateral E/e': 11.2  Medial E/e': 9.6              _____________________________________________________________________________  __        Report approved by: Shannan Lilly 11/16/2017 09:10 AM           ASSESSMENT/PLAN:                                                        ICD-10-CM    1. Left inguinal hernia K40.90 GENERAL SURG ADULT REFERRAL   2. History of Parker's sarcoma Z85.830    3. Mild depression (H) F32.0    4. Medication monitoring encounter Z51.81      Discussed treatment/modality options, including risk and benefits, he desires further health care maintenance, heat/ice/stretching, OTC meds, referrals (General Surgery), and observation. All diagnosis above reviewed and noted above, otherwise stable.  See [facility] orders for further details.  Follow up as needed.    Health Maintenance Due   Topic Date Due     DEPRESSION ACTION PLAN Q1 YR  11/09/2003     PHQ-9 Q6 MONTHS  11/09/2003     Patient Instructions     Follow up with General Surgery, as discussed    Icing and OTC pain medications as needed      The information in this document, created by the medical scribe for me, accurately reflects the services I personally performed and the decisions made by me. I have reviewed and approved this document for accuracy.   Ed Flores MD [facility]            Ed Flores MD [address]  [id number] [phone] Fax

## 2017-11-22 NOTE — PATIENT INSTRUCTIONS
Follow up with General Surgery, as discussed    Icing and OTC pain medications as needed      [facility]                        To reach your care team during and after hours:   [phone]  To reach our pharmacy:        [phone]    Clinic Hours                        Our clinic hours are:    Monday   7:30 am to 7:00 pm                  Tuesday through Friday 7:30 am to 5:00 pm                             Saturday   8:00 am to 12:00 pm      Sunday   Closed      Pharmacy Hours                        Our pharmacy hours are:    Monday   8:30 am to 7:00 pm       Tuesday to Friday  8:30 am to 6:00 pm                       Saturday    9:00 am to 1:00 pm              Sunday    Closed              There is also information available at our web site:  [email or web address]    If your provider ordered any lab tests and you do not receive the results within 10 business days, please call the clinic.    If you need a medication refill please contact your pharmacy.  Please allow 2-3 business days for your refill to be completed.    Our clinic offers telephone visits and e visits.  Please ask one of your team members to explain more.      Use [employer] (secure email communication and access to your chart) to send your primary care provider a message or make an appointment. Ask someone on your Team how to sign up for [employer].  Immunizations                      Immunization History   Administered Date(s) Administered     Influenza (IIV3) PF 09/09/2016     Influenza Vaccine IM 3yrs+ 4 Valent IIV4 10/01/2017     TDAP Vaccine (Adacel) 12/08/2016        Health Maintenance                         There are no preventive care reminders to display for this patient.

## 2017-11-22 NOTE — NURSING NOTE
"Chief Complaint   Patient presents with     Abdominal Pain       Initial /86  Pulse 100  Temp 98.1  F (36.7  C) (Oral)  Ht 5' 8.75\" (1.746 m)  Wt 201 lb (91.2 kg)  SpO2 100%  BMI 29.9 kg/m2 Estimated body mass index is 29.9 kg/(m^2) as calculated from the following:    Height as of this encounter: 5' 8.75\" (1.746 m).    Weight as of this encounter: 201 lb (91.2 kg)..  BP completed using cuff size: jaz Adan MA  "

## 2017-11-22 NOTE — PROGRESS NOTES
"[facility]  Surgery Consultation    HPI: Patient is a 32 year old male who is here for consultation requested by Ed Flores [phone] for evaluation of bilateral groin pain. 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 He complains of a 3 day history of bilateral groin pain. He states he is lifting a heavy turkey and noticed pain bilaterally. He noticed some swelling and redness on bilateral groins. The pain is distributed throughout but the swelling is more prominent on the left. He has been tolerating a diet and has had normal bowel movements. He does feel some indigestion but did pass gas on the way to the office visit. He has never had anything like this before. His pain is currently rated at a 10 out of 10 when he touches the area. It is decreased with rest. He has no other complaints. Patient denies fevers, chills, nausea, vomiting, SOB, chest pain, abdominal pain.    PMH:   has a past medical history of Parker's sarcoma (H) (1997); Hyperlipidemia LDL goal <130; and Mild depression (H).  PSH:    has a past surgical history that includes surgical history of - (1997) and Dental surgery (2001).  Social History:   reports that he has never smoked. He has never used smokeless tobacco. He reports that he drinks about 7.2 - 10.8 oz of alcohol per week  He reports that he uses illicit drugs, including Marijuana.  Family History:   family history includes Colon Cancer in his paternal grandmother; Hyperlipidemia in his brother and father.  Medications/Allergies: Home medications and allergies reviewed.    ROS:  The 10 point Review of Systems is negative other than noted in the HPI.    Physical Exam:  /76  Pulse 85  Ht 5' 8.75\" (1.746 m)  Wt 201 lb (91.2 kg)  BMI 29.9 kg/m2  GENERAL: Generally appears well.  Psych: Alert and Oriented.  Normal " affect  Eyes: Sclera clear  Respiratory:  Lungs clear to ausculation bilaterally with good air excursion  Cardiovascular:  Regular Rate and Rhythm with no murmurs gallops or rubs, normal peripheral pulses  GI: Abdomen Soft. Tender in bilateral groin and lower abdominal area.  Groin- I examined the patient in both the standing and supine positions. He has retraction and a scar in the right groin. Prosthetic testicle palpated. There is puffiness with erythema in bilateral groin. Severe tenderness on palpation throughout both sides. Left testicle normal.  Lymphatic/Hematologic/Immune:  No femoral or cervical lymphadenopathy.  Integumentary:  No rashes  Neurological: grossly intact    All new lab and imaging data was reviewed.     Impression and Plan:  Patient is a 32 year old male with groin pain.    PLAN:   Patient presents with severe bilateral groin pain and possible left groin swelling. On examination he is exquisitely tender in bilateral groins limiting exam. There is definite swelling on the left but I could perform a thorough exam given his tenderness. He also has equal tenderness on the right side without any swelling. Given his complex past surgical/radiation therapy, I would recommend going forward with the CT to determine etiology of his current swelling and erythema. If he does have an incarcerated hernia he will need to undergo urgent operation. I will contact the patient after CT to formulate a definitive treatment plan.      Thank you very much for this consult.    Levi Devine M.D.  [facility]  [phone]    Please route or send letter to:  Primary Care Provider (PCP) and Referring Provider

## 2017-11-22 NOTE — MR AVS SNAPSHOT
After Visit Summary   11/22/2017    Agapito Yu    MRN: [id number]           Patient Information     Date Of Birth          1985        Visit Information        Provider Department      11/22/2017 11:00 AM Ed Flores MD [facility]  [place]        Today's Diagnoses     Left inguinal hernia    -  1    History of Parker's sarcoma        Mild depression (H)        Medication monitoring encounter          Care Instructions    Follow up with General Surgery, as discussed    Icing and OTC pain medications as needed      [facility]                        To reach your care team during and after hours:   [phone]  To reach our pharmacy:        [phone]    Clinic Hours                        Our clinic hours are:    Monday   7:30 am to 7:00 pm                  Tuesday through Friday 7:30 am to 5:00 pm                             Saturday   8:00 am to 12:00 pm      Sunday   Closed      Pharmacy Hours                        Our pharmacy hours are:    Monday   8:30 am to 7:00 pm       Tuesday to Friday  8:30 am to 6:00 pm                       Saturday    9:00 am to 1:00 pm              Sunday    Closed              There is also information available at our web site:  [email or web address]    If your provider ordered any lab tests and you do not receive the results within 10 business days, please call the clinic.    If you need a medication refill please contact your pharmacy.  Please allow 2-3 business days for your refill to be completed.    Our clinic offers telephone visits and e visits.  Please ask one of your team members to explain more.      Use [employer] (secure email communication and access to your chart) to send your primary care provider a message or make an appointment. Ask someone on your Team how to sign up for [employer].  Immunizations                      Immunization History   Administered Date(s) Administered     Influenza (IIV3) PF 09/09/2016     Influenza  Vaccine IM 3yrs+ 4 Valent IIV4 10/01/2017     TDAP Vaccine (Adacel) 12/08/2016        Health Maintenance                         There are no preventive care reminders to display for this patient.            Follow-ups after your visit        Additional Services     GENERAL SURG ADULT REFERRAL       Your provider has referred you to: TAYLER: [facility] [place] [phone]   [email or web address]    Please be aware that coverage of these services is subject to the terms and limitations of your health insurance plan.  Call member services at your health plan with any benefit or coverage questions.      Please bring the following with you to your appointment:    (1) Any X-Rays, CTs or MRIs which have been performed.  Contact the facility where they were done to arrange for  prior to your scheduled appointment.   (2) List of current medications   (3) This referral request   (4) Any documents/labs given to you for this referral                  Your next 10 appointments already scheduled     Dec 07, 2017 11:15 AM CST   Cardio-Oncology New with Maia Hernandez DO   [facility] ([facility])    [address] [address] [address] [phone]              Who to contact     If you have questions or need follow up information about today's clinic visit or your schedule please contact [facility] directly at [phone].  Normal or non-critical lab and imaging results will be communicated to you by MyChart, letter or phone within 4 business days after the clinic has received the results. If you do not hear from us within 7 days, please contact the clinic through MyChart or phone. If you have a critical or abnormal lab result, we will notify you by phone as soon as possible.  Submit refill requests through [employer] or call your pharmacy and they will forward the refill  "request to us. Please allow 3 business days for your refill to be completed.          Additional Information About Your Visit        [email or web address] Information     [employer] gives you secure access to your electronic health record. If you see a primary care provider, you can also send messages to your care team and make appointments. If you have questions, please call your primary care clinic.  If you do not have a primary care provider, please call [phone] and they will assist you.        Care EveryWhere ID     This is your Care EveryWhere ID. This could be used by other organizations to access your [facility] medical records  [id number]        Your Vitals Were     Pulse Temperature Height Pulse Oximetry BMI (Body Mass Index)       100 98.1  F (36.7  C) (Oral) 5' 8.75\" (1.746 m) 100% 29.9 kg/m2        Blood Pressure from Last 3 Encounters:   11/22/17 128/86   12/08/16 112/60   11/28/16 108/72    Weight from Last 3 Encounters:   11/22/17 201 lb (91.2 kg)   12/08/16 191 lb (86.6 kg)   11/28/16 191 lb 9.6 oz (86.9 kg)              We Performed the Following     GENERAL SURG ADULT REFERRAL          Today's Medication Changes          These changes are accurate as of: 11/22/17 11:48 AM.  If you have any questions, ask your nurse or doctor.               These medicines have changed or have updated prescriptions.        Dose/Directions    sertraline 100 MG tablet   Commonly known as:  ZOLOFT   This may have changed:  Another medication with the same name was removed. Continue taking this medication, and follow the directions you see here.   Used for:  Major depressive disorder, recurrent episode, mild (H)   Changed by:  Weiler, Karen, MD        Dose:  100 mg   Take 1 tablet (100 mg) by mouth daily   Quantity:  90 tablet   Refills:  3                Primary Care Provider Office Phone # Fax #    Mark Riojas [phone] [address]  [place] [address]        Equal Access to Services     MARIEL JOHN " AH: [facility] burns Anastaciaali, waermiasda luqadaha, qaybta kajada shalahaylieroger hayjay biswasmairandal edouard. [facility] [phone].    ATENCIÓN: Si habla español, tiene a berger disposición servicios gratuitos de asistencia lingüística. Llame al [phone].    We comply with applicable federal civil rights laws and [place] laws. We do not discriminate on the basis of race, color, national origin, age, disability, sex, sexual orientation, or gender identity.            Thank you!     Thank you for choosing [facility]  for your care. Our goal is always to provide you with excellent care. Hearing back from our patients is one way we can continue to improve our services. Please take a few minutes to complete the written survey that you may receive in the mail after your visit with us. Thank you!             Your Updated Medication List - Protect others around you: Learn how to safely use, store and throw away your medicines at www.disposemymeds.org.          This list is accurate as of: 11/22/17 11:48 AM.  Always use your most recent med list.                   Brand Name Dispense Instructions for use Diagnosis    atorvastatin 40 MG tablet    LIPITOR    90 tablet    Take 1 tablet (40 mg) by mouth daily    Familial hypercholesterolemia       sertraline 100 MG tablet    ZOLOFT    90 tablet    Take 1 tablet (100 mg) by mouth daily    Major depressive disorder, recurrent episode, mild (H)       VIAGRA 50 MG tablet   Generic drug:  sildenafil      TK SS TO ONE T PO PRN - ONE HOUR BEFORE SEXUAL ACTIVITY

## 2017-11-22 NOTE — LETTER
"2017    Re: Agapito Yu -YOAN 1985    HPI: Patient is a 32 year old male who is here for consultation requested by Ed Flores [phone] for evaluation of bilateral groin pain. 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 He complains of a 3 day history of bilateral groin pain. He states he is lifting a heavy turkey and noticed pain bilaterally. He noticed some swelling and redness on bilateral groins. The pain is distributed throughout but the swelling is more prominent on the left. He has been tolerating a diet and has had normal bowel movements. He does feel some indigestion but did pass gas on the way to the office visit. He has never had anything like this before. His pain is currently rated at a 10 out of 10 when he touches the area. It is decreased with rest. He has no other complaints. Patient denies fevers, chills, nausea, vomiting, SOB, chest pain, abdominal pain.     PMH:   has a past medical history of Parker's sarcoma (H) (); Hyperlipidemia LDL goal <130; and Mild depression (H).  PSH:    has a past surgical history that includes surgical history of - () and Dental surgery ().  Social History:   reports that he has never smoked. He has never used smokeless tobacco. He reports that he drinks about 7.2 - 10.8 oz of alcohol per week  He reports that he uses illicit drugs, including Marijuana.  Family History:   family history includes Colon Cancer in his paternal grandmother; Hyperlipidemia in his brother and father.  Medications/Allergies: Home medications and allergies reviewed.     ROS:  The 10 point Review of Systems is negative other than noted in the HPI.     Physical Exam:  /76  Pulse 85  Ht 5' 8.75\" (1.746 m)  Wt 201 lb (91.2 kg)  BMI 29.9 kg/m2  GENERAL: Generally appears well.  Psych: Alert and Oriented.  Normal " affect  Eyes: Sclera clear  Respiratory:  Lungs clear to ausculation bilaterally with good air excursion  Cardiovascular:  Regular Rate and Rhythm with no murmurs gallops or rubs, normal peripheral pulses  GI: Abdomen Soft. Tender in bilateral groin and lower abdominal area.  Groin- I examined the patient in both the standing and supine positions. He has retraction and a scar in the right groin. Prosthetic testicle palpated. There is puffiness with erythema in bilateral groin. Severe tenderness on palpation throughout both sides. Left testicle normal.  Lymphatic/Hematologic/Immune:  No femoral or cervical lymphadenopathy.  Integumentary:  No rashes  Neurological: grossly intact     All new lab and imaging data was reviewed.      Impression and Plan:  Patient is a 32 year old male with groin pain.     PLAN:   Patient presents with severe bilateral groin pain and possible left groin swelling. On examination he is exquisitely tender in bilateral groins limiting exam. There is definite swelling on the left but I could perform a thorough exam given his tenderness. He also has equal tenderness on the right side without any swelling. Given his complex past surgical/radiation therapy, I would recommend going forward with the CT to determine etiology of his current swelling and erythema. If he does have an incarcerated hernia he will need to undergo urgent operation. I will contact the patient after CT to formulate a definitive treatment plan.     Thank you very much for this consult.     Levi Devine M.D.  [facility]  [phone]

## 2017-12-07 ENCOUNTER — OFFICE VISIT (OUTPATIENT)
Dept: CARDIOLOGY | Facility: CLINIC | Age: 32
End: 2017-12-07
Payer: COMMERCIAL

## 2017-12-07 VITALS
SYSTOLIC BLOOD PRESSURE: 118 MMHG | HEART RATE: 84 BPM | DIASTOLIC BLOOD PRESSURE: 70 MMHG | WEIGHT: 199.9 LBS | BODY MASS INDEX: 29.61 KG/M2 | HEIGHT: 69 IN

## 2017-12-07 DIAGNOSIS — I42.9 SECONDARY CARDIOMYOPATHY (H): ICD-10-CM

## 2017-12-07 DIAGNOSIS — R07.89 OTHER CHEST PAIN: Primary | ICD-10-CM

## 2017-12-07 DIAGNOSIS — E78.01 FAMILIAL HYPERCHOLESTEREMIA: ICD-10-CM

## 2017-12-07 DIAGNOSIS — R94.31 ABNORMAL ELECTROCARDIOGRAM: ICD-10-CM

## 2017-12-07 PROCEDURE — 93000 ELECTROCARDIOGRAM COMPLETE: CPT | Performed by: INTERNAL MEDICINE

## 2017-12-07 PROCEDURE — 99204 OFFICE O/P NEW MOD 45 MIN: CPT | Performed by: INTERNAL MEDICINE

## 2017-12-07 NOTE — PROGRESS NOTES
HPI and Plan:   See dictation    Orders Placed This Encounter   Procedures     MRI Cardiac w/contrast and stress     Follow-Up with Cardiologist     EKG 12-lead complete w/read - Clinics (performed today)       No orders of the defined types were placed in this encounter.      There are no discontinued medications.      Encounter Diagnoses   Name Primary?     Other chest pain Yes     Secondary cardiomyopathy (H)      Abnormal electrocardiogram      Familial hypercholesteremia        CURRENT MEDICATIONS:  Current Outpatient Prescriptions   Medication Sig Dispense Refill     atorvastatin (LIPITOR) 40 MG tablet Take 1 tablet (40 mg) by mouth daily 90 tablet 3     VIAGRA 50 MG cap/tab TK SS TO ONE T PO PRN - ONE HOUR BEFORE SEXUAL ACTIVITY  0     sertraline (ZOLOFT) 100 MG tablet Take 1 tablet (100 mg) by mouth daily 90 tablet 3       ALLERGIES   No Known Allergies    PAST MEDICAL HISTORY:  Past Medical History:   Diagnosis Date     Parker's sarcoma (H) 1997    [place] - Rt Hip     Hyperlipidemia LDL goal <130      Major depressive disorder, recurrent episode, mild (H) 12/8/2016     Mild depression (H)        PAST SURGICAL HISTORY:  Past Surgical History:   Procedure Laterality Date     DENTAL SURGERY  2001    wisdom teeth     SURGICAL HISTORY OF -   1997    Rt Hip/Ischium/Testicle removal - Parker's       FAMILY HISTORY:  Family History   Problem Relation Age of Onset     Hyperlipidemia Father      Hyperlipidemia Brother      Colon Cancer Paternal Grandmother        SOCIAL HISTORY:  Social History     Social History     Marital status:      Spouse name: Toshia     Number of children: 0     Years of education: N/A     Social History Main Topics     Smoking status: Never Smoker     Smokeless tobacco: Never Used     Alcohol use 7.2 - 10.8 oz/week     12 - 18 Standard drinks or equivalent per week      Comment: 12-18 drinks per week      Drug use: Yes     Special: Marijuana      Comment: marvel      Sexual activity:  "Yes     Partners: Female     Birth control/ protection: None     Other Topics Concern     Parent/Sibling W/ Cabg, Mi Or Angioplasty Before 65f 55m? No     Social History Narrative       Review of Systems:  Skin:  Negative       Eyes:  Negative   has lasix  ENT:  Negative      Respiratory:  Negative       Cardiovascular:    chest pain;Positive for    Gastroenterology: Negative      Genitourinary:  Negative      Musculoskeletal:  Negative      Neurologic:  Negative      Psychiatric:  Negative      Heme/Lymph/Imm:  Negative      Endocrine:  Negative        Physical Exam:  Vitals: /70 (BP Location: Right arm, Patient Position: Sitting, Cuff Size: Adult Regular)  Pulse 84  Ht 1.746 m (5' 8.75\")  Wt 90.7 kg (199 lb 14.4 oz)  BMI 29.74 kg/m2    Constitutional:  cooperative, alert and oriented, well developed, well nourished, in no acute distress        Skin:  warm and dry to the touch          Head:  normocephalic, no masses or lesions        Eyes:  pupils equal and round        Lymph:      ENT:           Neck:  carotid pulses are full and equal bilaterally        Respiratory:  clear to auscultation;normal symmetry         Cardiac: regular rhythm;no murmurs, gallops or rubs detected                                                         GI:  abdomen soft;no bruits        Extremities and Muscular Skeletal:  no deformities, clubbing, cyanosis, erythema observed;no edema              Neurological:  no gross motor deficits;affect appropriate        Psych:  Alert and Oriented x 3          CC  Karen Weiler, MD  [address] [place] [address]      ADDENDUM: I ALSO DISCUSSED ABSTINENCE FROM ALCOHOL GIVEN H/O EXCESSIVE ETOH INTAKE. THIS MAY BE CONTRIBUTING FACTOR AND CAUSE CMP AS WELL.            "

## 2017-12-07 NOTE — PROGRESS NOTES
HISTORY OF PRESENT ILLNESS:  Mr. Yu is a pleasant 32-year-old male who has been referred to our clinic today due to an abnormal echocardiogram.  He had presented to his primary care provider with some complaints of chest pains and he had done a little bit of research on his own.  Apparently he was diagnosed with Parker's sarcoma at a young age and underwent chemotherapy as well as radiation therapy to his right ischium with removal of the right ischial bone at age 12.  One of the chemo agents he received was Adriamycin and he was concerned about the potential long-term cardiac effects of receiving this medication.  Therefore he underwent an echocardiogram on 11/16.  This came back showing some mild global hypokinesis with an estimated ejection fraction of around 40-45%.  There were no other obvious structural abnormalities or valvular abnormalities.  Mr. Yu has been experiencing some tightness across his chest at times.  It is intermittent.  It does not seem to be related to exertion or emotional stress.  It seems to be improved with ibuprofen.  He does not have any associated symptoms such as palpitations, shortness of breath or lightheadedness.  He does also have a history of familial hypercholesterolemia and has been on Lipitor now for about 4 years.  He is concerned that he may be having some side effect with the Lipitor with both chest pains and some muscle aching.  I did perform an EKG in the office today, which I reviewed.  This demonstrates a sinus tachycardia with a heart rate of 102 beats per minute.  He has a normal axis.  He has T-wave abnormalities seen diffusely.  I do not have a previous available for comparison.      PHYSICAL EXAMINATION:   VITAL SIGNS:  On exam today, his blood pressure is normotensive at 118/70, pulse of 84, weight 199.  Body mass index of 29.   NECK:  Carotid upstrokes seem brisk without bruit.   CARDIOVASCULAR:  Tones are regular.  I do not appreciate a murmur, gallop or  rub.   LUNGS:  Clear posteriorly.   EXTREMITIES:  He has strong and symmetric pulses in the upper and lower extremities without peripheral edema.      SUMMARY:  Mr. Spann is a pleasant 32-year-old male with atypical chest pain symptoms and a mild cardiomyopathy.  He has received Adriamycin therapy more than 20 years ago for Parker sarcoma along with several other chemo agents.  He does remember receiving some cardiac testing at that time and does not recall ever having any abnormal tests come back, but is aware of the potential long-term complications of the chemo agents.  Because of his underlying history of familial hypercholesterolemia and chest pain symptoms, I would recommend that we do ischemic testing on him.  I have recommended that he undergo a stress cardiac MRI so that we can get a more accurate measurement of his ejection fraction, look for any inflammation scarring as well as possible ischemia.  I explained this test to him and he is agreeable to proceed.  If he does in fact have evidence of a nonischemic cardiomyopathy possibly related to previous chemotherapy, I talked to him about starting possible lisinopril to see if this does not help prevent any further dysfunction or symptoms as well as potentially possibly improving his overall heart function.  We will discuss this upon his return visit after his stress cardiac MRI.  Please feel free to contact me with any questions you have in regards to his care and thank you for allowing me to participate in the care of your nice patient.      cc:   Karen M. Weiler, MD   [address]  [address]         RONEL FREIRE DO             D: 2017 11:58   T: 2017 13:01   MT: [place]      Name:     ANETTE SPANN   MRN:      [id number]        Account:      [id number]   :      1985           Service Date: 2017      Document: [id number]

## 2017-12-07 NOTE — LETTER
12/7/2017      Ed Flores MD  [address] [facility] [address]      RE: Agapito Yu       Dear Colleague,    I had the pleasure of seeing Agapito Yu in the [facility].    HISTORY OF PRESENT ILLNESS:  Mr. Yu is a pleasant 32-year-old male who has been referred to our clinic today due to an abnormal echocardiogram.  He had presented to his primary care provider with some complaints of chest pains and he had done a little bit of research on his own.  Apparently he was diagnosed with Parker's sarcoma at a young age and underwent chemotherapy as well as radiation therapy to his right ischium with removal of the right ischial bone at age 12.  One of the chemo agents he received was Adriamycin and he was concerned about the potential long-term cardiac effects of receiving this medication.  Therefore he underwent an echocardiogram on 11/16.  This came back showing some mild global hypokinesis with an estimated ejection fraction of around 40-45%.  There were no other obvious structural abnormalities or valvular abnormalities.  Mr. Yu has been experiencing some tightness across his chest at times.  It is intermittent.  It does not seem to be related to exertion or emotional stress.  It seems to be improved with ibuprofen.  He does not have any associated symptoms such as palpitations, shortness of breath or lightheadedness.  He does also have a history of familial hypercholesterolemia and has been on Lipitor now for about 4 years.  He is concerned that he may be having some side effect with the Lipitor with both chest pains and some muscle aching.  I did perform an EKG in the office today, which I reviewed.  This demonstrates a sinus tachycardia with a heart rate of 102 beats per minute.  He has a normal axis.  He has T-wave abnormalities seen diffusely.  I do not have a previous available for comparison.      PHYSICAL EXAMINATION:   VITAL SIGNS:  On exam today, his  blood pressure is normotensive at 118/70, pulse of 84, weight 199.  Body mass index of 29.   NECK:  Carotid upstrokes seem brisk without bruit.   CARDIOVASCULAR:  Tones are regular.  I do not appreciate a murmur, gallop or rub.   LUNGS:  Clear posteriorly.   EXTREMITIES:  He has strong and symmetric pulses in the upper and lower extremities without peripheral edema.      SUMMARY:  Mr. Yu is a pleasant 32-year-old male with atypical chest pain symptoms and a mild cardiomyopathy.  He has received Adriamycin therapy more than 20 years ago for Parker sarcoma along with several other chemo agents.  He does remember receiving some cardiac testing at that time and does not recall ever having any abnormal tests come back, but is aware of the potential long-term complications of the chemo agents.  Because of his underlying history of familial hypercholesterolemia and chest pain symptoms, I would recommend that we do ischemic testing on him.  I have recommended that he undergo a stress cardiac MRI so that we can get a more accurate measurement of his ejection fraction, look for any inflammation scarring as well as possible ischemia.  I explained this test to him and he is agreeable to proceed.  If he does in fact have evidence of a nonischemic cardiomyopathy possibly related to previous chemotherapy, I talked to him about starting possible lisinopril to see if this does not help prevent any further dysfunction or symptoms as well as potentially possibly improving his overall heart function.  We will discuss this upon his return visit after his stress cardiac MRI.  Please feel free to contact me with any questions you have in regards to his care and thank you for allowing me to participate in the care of your nice patient.       Outpatient Encounter Prescriptions as of 12/7/2017   Medication Sig Dispense Refill     atorvastatin (LIPITOR) 40 MG tablet Take 1 tablet (40 mg) by mouth daily 90 tablet 3     VIAGRA 50 MG cap/tab  TK SS TO ONE T PO PRN - ONE HOUR BEFORE SEXUAL ACTIVITY  0     sertraline (ZOLOFT) 100 MG tablet Take 1 tablet (100 mg) by mouth daily 90 tablet 3     No facility-administered encounter medications on file as of 12/7/2017.        Again, thank you for allowing me to participate in the care of your patient.      Sincerely,    Maia Hernandez, DO     [facility]

## 2017-12-07 NOTE — MR AVS SNAPSHOT
After Visit Summary   12/7/2017    Agapito Yu    MRN: [id number]           Patient Information     Date Of Birth          1985        Visit Information        Provider Department      12/7/2017 11:15 AM Maia Hernandez DO [facility]        Today's Diagnoses     Other chest pain    -  1    Secondary cardiomyopathy (H)        Abnormal electrocardiogram        Familial hypercholesteremia           Follow-ups after your visit        Additional Services     Follow-Up with Cardiologist                 Your next 10 appointments already scheduled     Dec 14, 2017  8:00 AM CST   MR CARDIAC W CONTRAST AND STRESS with SCIMR1   [facility] (Cardiovascular Imaging at [facility])    [address]  [address]  [facility] [address] [phone]           Take your medicines as usual, unless your doctor tells you not to.   If you take Viagra, Levitra or Cialis, stop taking it 48 hours before your test.   If you take Aggrenox or dipyridamole (Persantine, Permole), stop taking it 48 hours before your test.   If you take Theophylline or Aminophylline, stop taking it 12 hours before your test.   If you are diabetic and take oral hypoglycemics, do not take them on the day of your test.  The day before your exam, drink extra fluids at least six 8-ounce glasses (unless your doctor wants you to limit your fluids).  Stop all caffeine 12 hours before the test. This includes coffee, tea, soda, chocolate and certain medicines (such as Anacin, Excedrin and NoDoz). Also avoid decaf coffee and tea, as these contain small amounts of caffeine.  Do not eat or drink for 3 hours before your exam. You may drink water and take your morning medicines.  You may need a blood test (creatinine test) within 30 days of your exam. Follow your doctor s orders if this is arranged before your exam.  If you are very claustrophobic, please let  you doctor know. You may get a sedative medicine from your doctor before you arrive. Bring the medicine to the exam. Do not take it at home. Arrive one hour early. Bring someone who can take you home after the test. Your medicine will make you sleepy. After the exam, you may not drive, take a bus or take a taxi by yourself.  The MRI machine uses a strong magnet. Please wear clothes without metal (snaps, zippers). A sweatsuit works well, or we may give you a hospital gown.  Please remove any body piercings and hair extensions before you arrive. You will remove watches, jewelry, hairpins, wallets, dentures, partial dental plates and hearing aids. You may wear contact lenses, and you may be able to wear your rings. We have a safe place to keep your personal items, but it is safer to leave them at home.   **IMPORTANT** THE INSTRUCTIONS BELOW ARE ONLY FOR THOSE PATIENTS WHO HAVE BEEN TOLD THEY WILL RECEIVE SEDATION OR GENERAL ANESTHESIA DURING THEIR MRI PROCEDURE:  IF YOU WILL RECEIVE SEDATION (take medicine to help you relax during your exam):   You must get the medicine from your doctor before you arrive. Bring the medicine to the exam. Do not take it at home.   Arrive one hour early. Bring someone who can take you home after the test. Your medicine will make you sleepy. After the exam, you may not drive, take a bus or take a taxi by yourself.   No eating 8 hours before your exam. You may have clear liquids up until 4 hours before your exam. (Clear liquids include water, clear tea, black coffee and fruit juice without pulp.)  IF YOU WILL RECEIVE ANESTHESIA (be asleep for your exam):   Arrive 1 1/2 hours early. Bring someone who can take you home after the test. You may not drive, take a bus or take a taxi by yourself.   No eating 8 hours before your exam. You may have clear liquids up until 4 hours before your exam. (Clear liquids include water, clear tea, black coffee and fruit juice without pulp.)  If you have any  questions, please contact your Imaging Department exam site.            Dec 19, 2017  4:15 PM CST   Return Visit with Maia Hernandez DO   [facility]    [address] [place], [place] [address]  [facility] [address] [phone]              Future tests that were ordered for you today     Open Future Orders        Priority Expected Expires Ordered    Follow-Up with Cardiologist Routine 1/6/2018 12/7/2018 12/7/2017    MRI Cardiac w/contrast and stress Routine 12/8/2017 12/7/2018 12/7/2017            Who to contact     If you have questions or need follow up information about today's clinic visit or your schedule please contact [facility] directly at [phone].  Normal or non-critical lab and imaging results will be communicated to you by [facility], letter or phone within 4 business days after the clinic has received the results. If you do not hear from us within 7 days, please contact the clinic through [facility] [employer] or phone. If you have a critical or abnormal lab result, we will notify you by phone as soon as possible.  Submit refill requests through [employer] or call your pharmacy and they will forward the refill request to us. Please allow 3 business days for your refill to be completed.          Additional Information About Your Visit        [facility] Insp[facility]harMy Visual Brief Information     [employer] gives you secure access to your electronic health record. If you see a primary care provider, you can also send messages to your care team and make appointments. If you have questions, please call your primary care clinic.  If you do not have a primary care provider, please call [phone] and they will assist you.        Care EveryWhere ID     This is your Care EveryWhere ID. This could be used by other organizations to access your [place] medical records  [id number]        Your Vitals Were     Pulse Height BMI (Body Mass Index)           "   84 1.746 m (5' 8.75\") 29.74 kg/m2          Blood Pressure from Last 3 Encounters:   12/07/17 118/70   11/22/17 116/76   11/22/17 128/86    Weight from Last 3 Encounters:   12/07/17 90.7 kg (199 lb 14.4 oz)   11/22/17 91.2 kg (201 lb)   11/22/17 91.2 kg (201 lb)              We Performed the Following     EKG 12-lead complete w/read - Clinics (performed today)        Primary Care Provider Office Phone # Fax #    Ed Flores [phone] [address] [address]        Equal Access to Services     TROY JOHN : Hadii aad ku hadasho Soeric, waaxda luqadaha, qaybta kaalmada adeegyada, roger edouard. So [facility] [phone].    ATENCIÓN: Si habla español, tiene a berger disposición servicios gratuitos de asistencia lingüística. [facility] [phone].    We comply with applicable federal civil rights laws and [place] laws. We do not discriminate on the basis of race, color, national origin, age, disability, sex, sexual orientation, or gender identity.            Thank you!     Thank you for choosing [facility]  for your care. Our goal is always to provide you with excellent care. Hearing back from our patients is one way we can continue to improve our services. Please take a few minutes to complete the written survey that you may receive in the mail after your visit with us. Thank you!             Your Updated Medication List - Protect others around you: Learn how to safely use, store and throw away your medicines at www.disposemymeds.org.          This list is accurate as of: 12/7/17 12:04 PM.  Always use your most recent med list.                   Brand Name Dispense Instructions for use Diagnosis    atorvastatin 40 MG tablet    LIPITOR    90 tablet    Take 1 tablet (40 mg) by mouth daily    Familial hypercholesterolemia       sertraline 100 MG tablet    ZOLOFT    90 tablet    Take 1 tablet (100 mg) by mouth " daily    Major depressive disorder, recurrent episode, mild (H)       VIAGRA 50 MG tablet   Generic drug:  sildenafil      TK SS TO ONE T PO PRN - ONE HOUR BEFORE SEXUAL ACTIVITY

## 2017-12-14 ENCOUNTER — HOSPITAL ENCOUNTER (OUTPATIENT)
Dept: CARDIOLOGY | Facility: CLINIC | Age: 32
Discharge: HOME | End: 2017-12-14
Attending: INTERNAL MEDICINE | Admitting: INTERNAL MEDICINE
Payer: COMMERCIAL

## 2017-12-14 DIAGNOSIS — R94.31 ABNORMAL ELECTROCARDIOGRAM: ICD-10-CM

## 2017-12-14 DIAGNOSIS — E78.01 FAMILIAL HYPERCHOLESTEREMIA: ICD-10-CM

## 2017-12-14 DIAGNOSIS — R07.89 OTHER CHEST PAIN: ICD-10-CM

## 2017-12-14 DIAGNOSIS — I42.9 SECONDARY CARDIOMYOPATHY (H): ICD-10-CM

## 2017-12-14 PROCEDURE — 75563 CARD MRI W/STRESS IMG & DYE: CPT | Mod: 26 | Performed by: INTERNAL MEDICINE

## 2017-12-14 PROCEDURE — 25000128 H RX IP 250 OP 636: Performed by: INTERNAL MEDICINE

## 2017-12-14 PROCEDURE — A9585 GADOBUTROL INJECTION: HCPCS | Performed by: INTERNAL MEDICINE

## 2017-12-14 PROCEDURE — 93018 CV STRESS TEST I&R ONLY: CPT | Performed by: INTERNAL MEDICINE

## 2017-12-14 PROCEDURE — 93017 CV STRESS TEST TRACING ONLY: CPT

## 2017-12-14 PROCEDURE — 93016 CV STRESS TEST SUPVJ ONLY: CPT | Performed by: INTERNAL MEDICINE

## 2017-12-14 RX ORDER — ACYCLOVIR 200 MG/1
0-1 CAPSULE ORAL
Status: DISCONTINUED | OUTPATIENT
Start: 2017-12-14 | End: 2017-12-15 | Stop reason: HOSPADM

## 2017-12-14 RX ORDER — GADOBUTROL 604.72 MG/ML
5-65 INJECTION INTRAVENOUS ONCE
Status: COMPLETED | OUTPATIENT
Start: 2017-12-14 | End: 2017-12-14

## 2017-12-14 RX ORDER — ALBUTEROL SULFATE 90 UG/1
2 AEROSOL, METERED RESPIRATORY (INHALATION) EVERY 5 MIN PRN
Status: DISCONTINUED | OUTPATIENT
Start: 2017-12-14 | End: 2017-12-15 | Stop reason: HOSPADM

## 2017-12-14 RX ORDER — AMINOPHYLLINE 25 MG/ML
50-100 INJECTION, SOLUTION INTRAVENOUS
Status: COMPLETED | OUTPATIENT
Start: 2017-12-14 | End: 2017-12-14

## 2017-12-14 RX ORDER — REGADENOSON 0.08 MG/ML
0.4 INJECTION, SOLUTION INTRAVENOUS ONCE
Status: COMPLETED | OUTPATIENT
Start: 2017-12-14 | End: 2017-12-14

## 2017-12-14 RX ADMIN — REGADENOSON 0.4 MG: 0.08 INJECTION, SOLUTION INTRAVENOUS at 08:35

## 2017-12-14 RX ADMIN — AMINOPHYLLINE 100 MG: 25 INJECTION, SOLUTION INTRAVENOUS at 08:37

## 2017-12-14 RX ADMIN — GADOBUTROL 24 ML: 604.72 INJECTION INTRAVENOUS at 09:18

## 2017-12-19 ENCOUNTER — OFFICE VISIT (OUTPATIENT)
Dept: CARDIOLOGY | Facility: CLINIC | Age: 32
End: 2017-12-19
Attending: INTERNAL MEDICINE
Payer: COMMERCIAL

## 2017-12-19 VITALS
HEIGHT: 69 IN | DIASTOLIC BLOOD PRESSURE: 86 MMHG | SYSTOLIC BLOOD PRESSURE: 124 MMHG | HEART RATE: 72 BPM | BODY MASS INDEX: 29.58 KG/M2 | WEIGHT: 199.7 LBS

## 2017-12-19 DIAGNOSIS — R94.31 ABNORMAL ELECTROCARDIOGRAM: ICD-10-CM

## 2017-12-19 DIAGNOSIS — E78.01 FAMILIAL HYPERCHOLESTEREMIA: ICD-10-CM

## 2017-12-19 DIAGNOSIS — I42.9 SECONDARY CARDIOMYOPATHY (H): ICD-10-CM

## 2017-12-19 DIAGNOSIS — R07.89 OTHER CHEST PAIN: ICD-10-CM

## 2017-12-19 PROCEDURE — 99213 OFFICE O/P EST LOW 20 MIN: CPT | Performed by: INTERNAL MEDICINE

## 2017-12-19 RX ORDER — LISINOPRIL 2.5 MG/1
2.5 TABLET ORAL DAILY
Qty: 30 TABLET | Refills: 11 | Status: SHIPPED | OUTPATIENT
Start: 2017-12-19 | End: 2019-06-06

## 2017-12-19 NOTE — MR AVS SNAPSHOT
After Visit Summary   12/19/2017    Agapito Yu    MRN: [id number]           Patient Information     Date Of Birth          1985        Visit Information        Provider Department      12/19/2017 4:15 PM Maia Hernandez DO [facility]        Today's Diagnoses     Other chest pain        Secondary cardiomyopathy (H)        Abnormal electrocardiogram        Familial hypercholesteremia           Follow-ups after your visit        Future tests that were ordered for you today     Open Future Orders        Priority Expected Expires Ordered    Basic metabolic panel Routine 12/29/2017 12/19/2018 12/19/2017            Who to contact     If you have questions or need follow up information about today's clinic visit or your schedule please contact [facility] directly at [phone].  Normal or non-critical lab and imaging results will be communicated to you by Bridgehart, letter or phone within 4 business days after the clinic has received the results. If you do not hear from us within 7 days, please contact the clinic through Bridgehart or phone. If you have a critical or abnormal lab result, we will notify you by phone as soon as possible.  Submit refill requests through [employer] or call your pharmacy and they will forward the refill request to us. Please allow 3 business days for your refill to be completed.          Additional Information About Your Visit        MyChart Information     [employer] gives you secure access to your electronic health record. If you see a primary care provider, you can also send messages to your care team and make appointments. If you have questions, please call your primary care clinic.  If you do not have a primary care provider, please call [phone] and they will assist you.        Care EveryWhere ID     This is your Care EveryWhere ID. This could be used by other organizations to access  "your [place] medical records  [id number]        Your Vitals Were     Pulse Height BMI (Body Mass Index)             72 1.746 m (5' 8.75\") 29.71 kg/m2          Blood Pressure from Last 3 Encounters:   12/19/17 124/86   12/07/17 118/70   11/22/17 116/76    Weight from Last 3 Encounters:   12/19/17 90.6 kg (199 lb 11.2 oz)   12/07/17 90.7 kg (199 lb 14.4 oz)   11/22/17 91.2 kg (201 lb)              We Performed the Following     Follow-Up with Cardiologist          Today's Medication Changes          These changes are accurate as of: 12/19/17  4:44 PM.  If you have any questions, ask your nurse or doctor.               Start taking these medicines.        Dose/Directions    lisinopril 2.5 MG tablet   Commonly known as:  PRINIVIL/Zestril   Used for:  Secondary cardiomyopathy (H)   Started by:  Maia Hernandez DO        Dose:  2.5 mg   Take 1 tablet (2.5 mg) by mouth daily   Quantity:  30 tablet   Refills:  11            Where to get your medicines      These medications were [place] [facility] [address] [place] [address]  [address] [facility] [address]     Phone:  [phone]     lisinopril 2.5 MG tablet                Primary Care Provider Office Phone # Fax #    Ed Flores [phone]       [facility] [address]        Equal Access to Services     [facility] AH: Hadii aad ku hadasho Soomaali, waaxda luqadaha, qaybta kaalmada adeegyada, waxjose edouard. So [facility] [phone].    ATENCIÓN: Si habla español, tiene a berger disposición servicios gratuitos de asistencia lingüística. Llame al [phone].    We comply with applicable federal civil rights laws and [place] laws. We do not discriminate on the basis of race, color, national origin, age, disability, sex, sexual orientation, or gender identity.            Thank you!     Thank you for choosing [facility]  for your care. " Our goal is always to provide you with excellent care. Hearing back from our patients is one way we can continue to improve our services. Please take a few minutes to complete the written survey that you may receive in the mail after your visit with us. Thank you!             Your Updated Medication List - Protect others around you: Learn how to safely use, store and throw away your medicines at www.disposemymeds.org.          This list is accurate as of: 12/19/17  4:44 PM.  Always use your most recent med list.                   Brand Name Dispense Instructions for use Diagnosis    atorvastatin 40 MG tablet    LIPITOR    90 tablet    Take 1 tablet (40 mg) by mouth daily    Familial hypercholesterolemia       lisinopril 2.5 MG tablet    PRINIVIL/Zestril    30 tablet    Take 1 tablet (2.5 mg) by mouth daily    Secondary cardiomyopathy (H)       sertraline 100 MG tablet    ZOLOFT    90 tablet    Take 1 tablet (100 mg) by mouth daily    Major depressive disorder, recurrent episode, mild (H)       VIAGRA 50 MG tablet   Generic drug:  sildenafil      TK SS TO ONE T PO PRN - ONE HOUR BEFORE SEXUAL ACTIVITY

## 2017-12-19 NOTE — LETTER
12/19/2017      Ed Flores MD  [address] [address]      RE: Agapito Yu       Dear Colleague,    I had the pleasure of seeing Agapito Yu in the [facility].    REFERRING PHYSICIAN:  Dr. Ed Flores.      HISTORY OF PRESENT ILLNESS:  Mr. Yu is a pleasant 32-year-old male who is following up today with the results of a cardiac MRI.  As you recall, he was referred to our clinic for abnormal echocardiogram which showed mild global hypokinesis and LV dysfunction with an EF estimated around 40%-45%.  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  I had referred him for a stress cardiac MRI to look for other possible etiologies.  Mr. Yu had also presented with some atypical chest pain symptoms that are likely noncardiac-related.  His stress MRI came back confirming mild LV dysfunction.  His EF is estimated at 45%.  There was no significant valvular heart disease.  His RV function was low normal at 53%.  There was no evidence of myocardial fibrosis or infiltrative disease and no evidence for ischemia.  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  I spent the majority of the appointment today talking to him about this diagnosis in education and counseling and further management options.  There is not a lot of literature about treatment of late onset cardiotoxicity, but the standard care is ACE inhibitors and beta blockers, and we did discuss this today.      PHYSICAL EXAMINATION:  His blood pressure is 124/86, pulse 72, weight 199.  Physical exam findings were otherwise unchanged.        ASSESSMENT AND PLAN:  In summary, Mr. 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  At this time, I am going to  start him off on low-dose lisinopril 2.5 mg.  I went over the potential side effects of this medication with him and also the need for a basic metabolic panel to test his kidney function and electrolytes within 7-10 days.  He has requested to have this done at his primary care doctor's office, and I would just request that the labs be sent to our office with this.  I would eventually like to start him on carvedilol as well, and I discussed this medication and its potential side effects, too.  He will contact me within 3-4 weeks if he is tolerating the lisinopril without any difficulty, and we will go ahead and start this medication.  Ultimately, I would like to titrate these medications to the extent that he tolerates and then reassess his LV function in 3-6 months.  Please feel free to contact me with any questions you have in regards to his care.       Outpatient Encounter Prescriptions as of 12/19/2017   Medication Sig Dispense Refill     lisinopril (PRINIVIL/ZESTRIL) 2.5 MG tablet Take 1 tablet (2.5 mg) by mouth daily 30 tablet 11     atorvastatin (LIPITOR) 40 MG tablet Take 1 tablet (40 mg) by mouth daily 90 tablet 3     [DISCONTINUED] sertraline (ZOLOFT) 100 MG tablet Take 1 tablet (100 mg) by mouth daily 90 tablet 3     VIAGRA 50 MG cap/tab TK SS TO ONE T PO PRN - ONE HOUR BEFORE SEXUAL ACTIVITY  0     No facility-administered encounter medications on file as of 12/19/2017.        Again, thank you for allowing me to participate in the care of your patient.      Sincerely,    Maia Hernandez, DO     [facility]

## 2017-12-19 NOTE — PROGRESS NOTES
HPI and Plan:   See dictation    Orders Placed This Encounter   Procedures     Basic metabolic panel       Orders Placed This Encounter   Medications     lisinopril (PRINIVIL/ZESTRIL) 2.5 MG tablet     Sig: Take 1 tablet (2.5 mg) by mouth daily     Dispense:  30 tablet     Refill:  11       There are no discontinued medications.      Encounter Diagnoses   Name Primary?     Other chest pain      Secondary cardiomyopathy (H)      Abnormal electrocardiogram      Familial hypercholesteremia        CURRENT MEDICATIONS:  Current Outpatient Prescriptions   Medication Sig Dispense Refill     lisinopril (PRINIVIL/ZESTRIL) 2.5 MG tablet Take 1 tablet (2.5 mg) by mouth daily 30 tablet 11     atorvastatin (LIPITOR) 40 MG tablet Take 1 tablet (40 mg) by mouth daily 90 tablet 3     sertraline (ZOLOFT) 100 MG tablet Take 1 tablet (100 mg) by mouth daily 90 tablet 3     VIAGRA 50 MG cap/tab TK SS TO ONE T PO PRN - ONE HOUR BEFORE SEXUAL ACTIVITY  0       ALLERGIES   No Known Allergies    PAST MEDICAL HISTORY:  Past Medical History:   Diagnosis Date     Parker's sarcoma (H) 1997    [place] - Rt Hip     Hyperlipidemia LDL goal <130      Major depressive disorder, recurrent episode, mild (H) 12/8/2016     Mild depression (H)        PAST SURGICAL HISTORY:  Past Surgical History:   Procedure Laterality Date     DENTAL SURGERY  2001    wisdom teeth     SURGICAL HISTORY OF -   1997    Rt Hip/Ischium/Testicle removal - Parker's       FAMILY HISTORY:  Family History   Problem Relation Age of Onset     Hyperlipidemia Father      Hyperlipidemia Brother      Colon Cancer Paternal Grandmother        SOCIAL HISTORY:  Social History     Social History     Marital status:      Spouse name: Toshia     Number of children: 0     Years of education: N/A     Social History Main Topics     Smoking status: Never Smoker     Smokeless tobacco: Never Used     Alcohol use 7.2 - 10.8 oz/week     12 - 18 Standard drinks or equivalent per week      Comment:  "12-18 drinks per week      Drug use: Yes     Special: Marijuana      Comment: marvel      Sexual activity: Yes     Partners: Female     Birth control/ protection: None     Other Topics Concern     Parent/Sibling W/ Cabg, Mi Or Angioplasty Before 65f 55m? No     Social History Narrative       Review of Systems:  Skin:  Negative       Eyes:  Negative      ENT:  Negative      Respiratory:  Negative       Cardiovascular:  Negative      Gastroenterology: Negative      Genitourinary:  Negative      Musculoskeletal:  Negative      Neurologic:  Negative      Psychiatric:  Positive for depression treated  Heme/Lymph/Imm:  Positive for allergies seasonal  Endocrine:  Positive for night sweats      Physical Exam:  Vitals: /86  Pulse 72  Ht 1.746 m (5' 8.75\")  Wt 90.6 kg (199 lb 11.2 oz)  BMI 29.71 kg/m2    Constitutional:  cooperative, alert and oriented, well developed, well nourished, in no acute distress        Skin:  warm and dry to the touch          Head:  normocephalic, no masses or lesions        Eyes:  pupils equal and round        Lymph:      ENT:           Neck:           Respiratory:  normal symmetry         Cardiac: regular rhythm                                                         GI:           Extremities and Muscular Skeletal:  no edema              Neurological:  no gross motor deficits;affect appropriate        Psych:  Alert and Oriented x 3          CC  Maia Hernandez, DO  [address]  [place] [address]                  "

## 2017-12-20 ENCOUNTER — MYC MEDICAL ADVICE (OUTPATIENT)
Dept: FAMILY MEDICINE | Facility: CLINIC | Age: 32
End: 2017-12-20

## 2017-12-20 NOTE — PROGRESS NOTES
REFERRING PHYSICIAN:  Dr. Ed Flores.      HISTORY OF PRESENT ILLNESS:  Mr. Yu is a pleasant 32-year-old male who is following up today with the results of a cardiac MRI.  As you recall, he was referred to our clinic for abnormal echocardiogram which showed mild global hypokinesis and LV dysfunction with an EF estimated around 40%-45%.  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  I had referred him for a stress cardiac MRI to look for other possible etiologies.  Mr. Yu had also presented with some atypical chest pain symptoms that are likely noncardiac-related.  His stress MRI came back confirming mild LV dysfunction.  His EF is estimated at 45%.  There was no significant valvular heart disease.  His RV function was low normal at 53%.  There was no evidence of myocardial fibrosis or infiltrative disease and no evidence for ischemia.  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  I spent the majority of the appointment today talking to him about this diagnosis in education and counseling and further management options.  There is not a lot of literature about treatment of late onset cardiotoxicity, but the standard care is ACE inhibitors and beta blockers, and we did discuss this today.      PHYSICAL EXAMINATION:  His blood pressure is 124/86, pulse 72, weight 199.  Physical exam findings were otherwise unchanged.        ASSESSMENT AND PLAN:  In summary, Mr. 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  At this time, I am going to start him off on low-dose lisinopril 2.5 mg.  I went over the potential side effects of this medication with him and also the need for a basic metabolic panel to test his kidney function and electrolytes within 7-10  days.  He has requested to have this done at his primary care doctor's office, and I would just request that the labs be sent to our office with this.  I would eventually like to start him on carvedilol as well, and I discussed this medication and its potential side effects, too.  He will contact me within 3-4 weeks if he is tolerating the lisinopril without any difficulty, and we will go ahead and start this medication.  Ultimately, I would like to titrate these medications to the extent that he tolerates and then reassess his LV function in 3-6 months.  Please feel free to contact me with any questions you have in regards to his care.      cc:   Ed Flores MD    [address]  [address]         RONEL FREIRE DO             D: 2017 16:43   T: 2017 20:57   MT: JOHN      Name:     ANETTE SPANN   MRN:      [id number]        Account:      [id number]   :      1985           Service Date: 2017      Document: [id number]

## 2017-12-21 NOTE — TELEPHONE ENCOUNTER
Please review with patient, would like to have Dr Hernandez's approval for cialis, based on heart concerns.    Ok for labs from Dr Hernandez to be completed here.    Advise cpx fasting in clinic soon

## 2017-12-22 ENCOUNTER — MYC MEDICAL ADVICE (OUTPATIENT)
Dept: FAMILY MEDICINE | Facility: CLINIC | Age: 32
End: 2017-12-22

## 2017-12-22 DIAGNOSIS — F33.0 MAJOR DEPRESSIVE DISORDER, RECURRENT EPISODE, MILD (H): ICD-10-CM

## 2017-12-22 RX ORDER — SERTRALINE HYDROCHLORIDE 100 MG/1
100 TABLET, FILM COATED ORAL DAILY
Qty: 90 TABLET | Refills: 3 | Status: CANCELLED | OUTPATIENT
Start: 2017-12-22

## 2017-12-22 ASSESSMENT — ANXIETY QUESTIONNAIRES
4. TROUBLE RELAXING: NOT AT ALL
6. BECOMING EASILY ANNOYED OR IRRITABLE: NOT AT ALL
GAD7 TOTAL SCORE: 0
7. FEELING AFRAID AS IF SOMETHING AWFUL MIGHT HAPPEN: NOT AT ALL
GAD7 TOTAL SCORE: 0
2. NOT BEING ABLE TO STOP OR CONTROL WORRYING: NOT AT ALL
5. BEING SO RESTLESS THAT IT IS HARD TO SIT STILL: NOT AT ALL
3. WORRYING TOO MUCH ABOUT DIFFERENT THINGS: NOT AT ALL
7. FEELING AFRAID AS IF SOMETHING AWFUL MIGHT HAPPEN: NOT AT ALL
1. FEELING NERVOUS, ANXIOUS, OR ON EDGE: NOT AT ALL

## 2017-12-22 ASSESSMENT — PATIENT HEALTH QUESTIONNAIRE - PHQ9
SUM OF ALL RESPONSES TO PHQ QUESTIONS 1-9: 3
10. IF YOU CHECKED OFF ANY PROBLEMS, HOW DIFFICULT HAVE THESE PROBLEMS MADE IT FOR YOU TO DO YOUR WORK, TAKE CARE OF THINGS AT HOME, OR GET ALONG WITH OTHER PEOPLE: NOT DIFFICULT AT ALL
SUM OF ALL RESPONSES TO PHQ QUESTIONS 1-9: 3

## 2017-12-22 NOTE — TELEPHONE ENCOUNTER
Requested Prescriptions   Pending Prescriptions Disp Refills     sertraline (ZOLOFT) 100 MG tablet 90 tablet 3     Sig: Take 1 tablet (100 mg) by mouth daily    SSRIs Protocol Failed    12/22/2017  2:36 PM       Failed - PHQ-9 score less than 5 in past 6 months    The PHQ-9 criteria is meant to fail. It requires a PHQ-9 score review         Passed - Patient is age 18 or older       Passed - Recent (6 mo) or future visit with authorizing provider's specialty    Patient had office visit in the last 6 months or has a visit in the next 30 days with authorizing provider.  See chart review.             No flowsheet data found.  Pt due for survey. Sending over Toutiao.  Laura Avendaño RN  [facility]

## 2017-12-23 ASSESSMENT — ANXIETY QUESTIONNAIRES: GAD7 TOTAL SCORE: 0

## 2017-12-23 ASSESSMENT — PATIENT HEALTH QUESTIONNAIRE - PHQ9: SUM OF ALL RESPONSES TO PHQ QUESTIONS 1-9: 3

## 2017-12-29 DIAGNOSIS — I42.9 SECONDARY CARDIOMYOPATHY (H): ICD-10-CM

## 2017-12-29 LAB
ANION GAP SERPL CALCULATED.3IONS-SCNC: 9 MMOL/L (ref 3–14)
BUN SERPL-MCNC: 20 MG/DL (ref 7–30)
CALCIUM SERPL-MCNC: 8.9 MG/DL (ref 8.5–10.1)
CHLORIDE SERPL-SCNC: 103 MMOL/L (ref 94–109)
CO2 SERPL-SCNC: 24 MMOL/L (ref 20–32)
CREAT SERPL-MCNC: 0.88 MG/DL (ref 0.66–1.25)
GFR SERPL CREATININE-BSD FRML MDRD: >90 ML/MIN/1.7M2
GLUCOSE SERPL-MCNC: 83 MG/DL (ref 70–99)
POTASSIUM SERPL-SCNC: 3.7 MMOL/L (ref 3.4–5.3)
SODIUM SERPL-SCNC: 136 MMOL/L (ref 133–144)

## 2017-12-29 PROCEDURE — 36415 COLL VENOUS BLD VENIPUNCTURE: CPT | Performed by: FAMILY MEDICINE

## 2017-12-29 PROCEDURE — 80048 BASIC METABOLIC PNL TOTAL CA: CPT | Performed by: FAMILY MEDICINE

## 2018-01-03 RX ORDER — SERTRALINE HYDROCHLORIDE 100 MG/1
100 TABLET, FILM COATED ORAL DAILY
Qty: 90 TABLET | Refills: 0 | Status: SHIPPED | OUTPATIENT
Start: 2018-01-03 | End: 2018-05-29

## 2018-01-03 NOTE — TELEPHONE ENCOUNTER
PHQ-9 SCORE 12/22/2017   Total Score MyChart 3 (Minimal depression)   Total Score 3     FABIAN-7 SCORE 12/22/2017   Total Score 0 (minimal anxiety)   Total Score 0     Prescription approved per [facility] Refill Protocol.  Laura Avendaño RN  [facility]

## 2018-01-05 ENCOUNTER — MYC MEDICAL ADVICE (OUTPATIENT)
Dept: CARDIOLOGY | Facility: CLINIC | Age: 33
End: 2018-01-05

## 2018-01-05 DIAGNOSIS — E78.1 PURE HYPERGLYCERIDEMIA: ICD-10-CM

## 2018-01-05 DIAGNOSIS — I42.9 SECONDARY CARDIOMYOPATHY (H): Primary | ICD-10-CM

## 2018-01-08 ENCOUNTER — MYC MEDICAL ADVICE (OUTPATIENT)
Dept: CARDIOLOGY | Facility: CLINIC | Age: 33
End: 2018-01-08

## 2018-01-08 DIAGNOSIS — I42.9 SECONDARY CARDIOMYOPATHY (H): Primary | ICD-10-CM

## 2018-01-26 NOTE — TELEPHONE ENCOUNTER
I reviewed with  that pt will be coming in for nurse bp check on 2/1. She said if bp looks stable on the lisinopril, then pt should also start carvedilol 3.125 mg twice daily. Will wait for bp check to come back and then provide pt with recommendations. Ericka GANN

## 2018-02-07 ENCOUNTER — MYC MEDICAL ADVICE (OUTPATIENT)
Dept: CARDIOLOGY | Facility: CLINIC | Age: 33
End: 2018-02-07

## 2018-02-07 NOTE — TELEPHONE ENCOUNTER
I reviewed with  that pt canceled his bp check. She wants him to either check his bps at home or in clinic before adding carvedilol 3.125 mg BID. Pt was started on lisinopril at 12/19 OV and  wants to make sure bp stable with that medication first. Will send pt an update in [employer].

## 2018-02-09 ENCOUNTER — ALLIED HEALTH/NURSE VISIT (OUTPATIENT)
Dept: NURSING | Facility: CLINIC | Age: 33
End: 2018-02-09
Payer: COMMERCIAL

## 2018-02-09 VITALS — SYSTOLIC BLOOD PRESSURE: 124 MMHG | DIASTOLIC BLOOD PRESSURE: 78 MMHG | OXYGEN SATURATION: 99 % | HEART RATE: 87 BPM

## 2018-02-09 DIAGNOSIS — Z01.30 BLOOD PRESSURE CHECK: Primary | ICD-10-CM

## 2018-02-09 PROCEDURE — 99207 ZZC NO CHARGE NURSE ONLY: CPT | Mod: 25

## 2018-02-09 NOTE — PROGRESS NOTES
Patient is here for Blood pressure check. Has been on Lisinopril for Secondary Cardiomyopathy since 12/19/17 per Dr. Hernandez. He said she had wanted him to get blood pressure checked and is in today for that.     /78 (BP Location: Left arm, Patient Position: Chair, Cuff Size: Adult Regular)  Pulse 87  SpO2 99%     Will route results to Dr. Hernandez. Shaista Holm R.N.    .

## 2018-02-09 NOTE — MR AVS SNAPSHOT
After Visit Summary   2/9/2018    Agapito Yu    MRN: [id number]           Patient Information     Date Of Birth          1985        Visit Information        Provider Department      2/9/2018 3:00 PM [facility] [place]        Today's Diagnoses     Blood pressure check    -  1       Follow-ups after your visit        Who to contact     If you have questions or need follow up information about today's clinic visit or your schedule please contact [facility] [place] directly at [phone].  Normal or non-critical lab and imaging results will be communicated to you by MyChart, letter or phone within 4 business days after the clinic has received the results. If you do not hear from us within 7 days, please contact the clinic through [employer]hart or phone. If you have a critical or abnormal lab result, we will notify you by phone as soon as possible.  Submit refill requests through [employer] or call your pharmacy and they will forward the refill request to us. Please allow 3 business days for your refill to be completed.          Additional Information About Your Visit        MyChart Information     [employer] gives you secure access to your electronic health record. If you see a primary care provider, you can also send messages to your care team and make appointments. If you have questions, please call your primary care clinic.  If you do not have a primary care provider, please call [phone] and they will assist you.        Care EveryWhere ID     This is your Care EveryWhere ID. This could be used by other organizations to access your [place] medical records  [id number]        Your Vitals Were     Pulse Pulse Oximetry                87 99%           Blood Pressure from Last 3 Encounters:   02/09/18 124/78   12/19/17 124/86   12/07/17 118/70    Weight from Last 3 Encounters:   12/19/17 199 lb 11.2 oz (90.6 kg)   12/07/17 199 lb 14.4 oz (90.7 kg)   11/22/17 201 lb  (91.2 kg)              Today, you had the following     No orders found for display       Primary Care Provider Office Phone # Fax #    Ed Flores [phone]       [facility] [address]        Equal Access to Services     YAMINITROY DORA : Hadii aad ku hadkranthio Soomaali, waaxda luqadaha, qaybta kaalmada adeegyada, waxjose idiin hayaan adejon duncan lajakub edouard. So [facility] [phone].    ATENCIÓN: Si habla español, tiene a berger disposición servicios gratuitos de asistencia lingüística. Llame al [phone].    We comply with applicable federal civil rights laws and [place] laws. We do not discriminate on the basis of race, color, national origin, age, disability, sex, sexual orientation, or gender identity.            Thank you!     Thank you for choosing [facility]  for your care. Our goal is always to provide you with excellent care. Hearing back from our patients is one way we can continue to improve our services. Please take a few minutes to complete the written survey that you may receive in the mail after your visit with us. Thank you!             Your Updated Medication List - Protect others around you: Learn how to safely use, store and throw away your medicines at www.disposemymeds.org.          This list is accurate as of 2/9/18  3:19 PM.  Always use your most recent med list.                   Brand Name Dispense Instructions for use Diagnosis    atorvastatin 40 MG tablet    LIPITOR    90 tablet    Take 1 tablet (40 mg) by mouth daily    Familial hypercholesterolemia       lisinopril 2.5 MG tablet    PRINIVIL/Zestril    30 tablet    Take 1 tablet (2.5 mg) by mouth daily    Secondary cardiomyopathy (H)       sertraline 100 MG tablet    ZOLOFT    90 tablet    Take 1 tablet (100 mg) by mouth daily    Major depressive disorder, recurrent episode, mild (H)       VIAGRA 50 MG tablet   Generic drug:  sildenafil      TK SS TO ONE T PO PRN - ONE HOUR BEFORE SEXUAL  ACTIVITY

## 2018-02-11 ENCOUNTER — MYC MEDICAL ADVICE (OUTPATIENT)
Dept: CARDIOLOGY | Facility: CLINIC | Age: 33
End: 2018-02-11

## 2018-02-11 DIAGNOSIS — I42.8 NONISCHEMIC CARDIOMYOPATHY (H): Primary | ICD-10-CM

## 2018-02-12 RX ORDER — CARVEDILOL 3.12 MG/1
3.12 TABLET ORAL 2 TIMES DAILY
Qty: 60 TABLET | Refills: 1 | Status: SHIPPED | OUTPATIENT
Start: 2018-02-12 | End: 2018-03-30

## 2018-03-30 DIAGNOSIS — I42.8 NONISCHEMIC CARDIOMYOPATHY (H): ICD-10-CM

## 2018-03-30 RX ORDER — CARVEDILOL 3.12 MG/1
3.12 TABLET ORAL 2 TIMES DAILY
Qty: 180 TABLET | Refills: 1 | Status: SHIPPED | OUTPATIENT
Start: 2018-03-30 | End: 2018-06-20 | Stop reason: ALTCHOICE

## 2018-05-29 DIAGNOSIS — F33.0 MAJOR DEPRESSIVE DISORDER, RECURRENT EPISODE, MILD (H): ICD-10-CM

## 2018-05-29 NOTE — TELEPHONE ENCOUNTER
sertraline (ZOLOFT) 100 MG tablet    Last Written Prescription Date:  1.3.18  Last Fill Quantity: 90,  # refills: 0   Last Office Visit: 11/22/2017   Future Office Visit:

## 2018-05-30 DIAGNOSIS — E78.01 FAMILIAL HYPERCHOLESTEROLEMIA: ICD-10-CM

## 2018-05-30 RX ORDER — ATORVASTATIN CALCIUM 40 MG/1
40 TABLET, FILM COATED ORAL DAILY
Qty: 30 TABLET | Refills: 0 | Status: SHIPPED | OUTPATIENT
Start: 2018-05-30 | End: 2018-06-28

## 2018-05-30 NOTE — TELEPHONE ENCOUNTER
atorvastatin (LIPITOR) 40 MG tablet    Last Written Prescription Date:  5.10.17  Last Fill Quantity: 90,  # refills: 3   Last Office Visit: 11/22/2017   Future Office Visit:

## 2018-05-30 NOTE — TELEPHONE ENCOUNTER
"Requested Prescriptions   Pending Prescriptions Disp Refills     atorvastatin (LIPITOR) 40 MG tablet 90 tablet 3     Sig: Take 1 tablet (40 mg) by mouth daily    Statins Protocol Failed    5/30/2018  2:21 PM       Failed - LDL on file in past 12 months    Recent Labs   Lab Test  03/27/17   1316   LDL  157*            Passed - No abnormal creatine kinase in past 12 months    No lab results found.            Passed - Recent (12 mo) or future (30 days) visit within the authorizing provider's specialty    Patient had office visit in the last 12 months or has a visit in the next 30 days with authorizing provider or within the authorizing provider's specialty.  See \"Patient Info\" tab in inbasket, or \"Choose Columns\" in Meds & Orders section of the refill encounter.           Passed - Patient is age 18 or older          Prescription approved per [facility] Refill Protocol.  #30 only. Due for physical and fasting labs.      Mallory Beckwith, BS, RN, PHN  [facility] [phone]      "

## 2018-06-01 RX ORDER — SERTRALINE HYDROCHLORIDE 100 MG/1
100 TABLET, FILM COATED ORAL DAILY
Qty: 30 TABLET | Refills: 0 | Status: SHIPPED | OUTPATIENT
Start: 2018-06-01 | End: 2018-06-28

## 2018-06-01 NOTE — TELEPHONE ENCOUNTER
"Requested Prescriptions   Pending Prescriptions Disp Refills     sertraline (ZOLOFT) 100 MG tablet 90 tablet 0     Sig: Take 1 tablet (100 mg) by mouth daily    SSRIs Protocol Failed    5/29/2018  1:57 PM       Failed - Recent (6 mo) or future (30 days) visit within the authorizing provider's specialty    Patient had office visit in the last 6 months or has a visit in the next 30 days with authorizing provider or within the authorizing provider's specialty.  See \"Patient Info\" tab in inbasket, or \"Choose Columns\" in Meds & Orders section of the refill encounter.           Passed - PHQ-9 score less than 5 in past 6 months    Please review last PHQ-9 score.          Passed - Patient is age 18 or older        PHQ-9 SCORE 12/22/2017   Total Score MyChart 3 (Minimal depression)   Total Score 3     Pt due for 6 month follow up and phx.  Sent survey over HDmessaging as well as alerted pt to make an appt. Sent partial refill.  Laura Avendaño RN  [facility]    "

## 2018-06-18 ENCOUNTER — MYC MEDICAL ADVICE (OUTPATIENT)
Dept: CARDIOLOGY | Facility: CLINIC | Age: 33
End: 2018-06-18

## 2018-06-18 DIAGNOSIS — I42.8 NONISCHEMIC CARDIOMYOPATHY (H): Primary | ICD-10-CM

## 2018-06-20 RX ORDER — METOPROLOL SUCCINATE 25 MG/1
25 TABLET, EXTENDED RELEASE ORAL DAILY
Qty: 90 TABLET | Refills: 3 | Status: SHIPPED | OUTPATIENT
Start: 2018-06-20 | End: 2019-06-06

## 2018-06-20 NOTE — TELEPHONE ENCOUNTER
Received MR Prestat message from pt requesting to change carvedilol 3.125 mg twice daily to a medication that can be taken only once per day. Per pt, twice daily dosing is not convenient. Reviewed with Dr. Hernandez, who recommended pt discontinue carvedilol and start taking metoprolol succinate 25 mg daily. Called pt to discuss and confirm preferred pharmacy, no answer. LVM requesting call back to Team 1. Will send MR Prestat message as well.

## 2018-06-28 ENCOUNTER — OFFICE VISIT (OUTPATIENT)
Dept: FAMILY MEDICINE | Facility: CLINIC | Age: 33
End: 2018-06-28
Payer: COMMERCIAL

## 2018-06-28 VITALS
WEIGHT: 199.4 LBS | OXYGEN SATURATION: 97 % | DIASTOLIC BLOOD PRESSURE: 80 MMHG | BODY MASS INDEX: 29.53 KG/M2 | HEIGHT: 69 IN | TEMPERATURE: 98.4 F | SYSTOLIC BLOOD PRESSURE: 111 MMHG | HEART RATE: 89 BPM

## 2018-06-28 DIAGNOSIS — Z00.01 ENCOUNTER FOR ROUTINE ADULT MEDICAL EXAM WITH ABNORMAL FINDINGS: Primary | ICD-10-CM

## 2018-06-28 DIAGNOSIS — Z13.1 SCREENING FOR DIABETES MELLITUS: ICD-10-CM

## 2018-06-28 DIAGNOSIS — R06.83 SNORING: ICD-10-CM

## 2018-06-28 DIAGNOSIS — G47.09 OTHER INSOMNIA: ICD-10-CM

## 2018-06-28 DIAGNOSIS — S39.012A STRAIN OF LUMBAR REGION, INITIAL ENCOUNTER: ICD-10-CM

## 2018-06-28 DIAGNOSIS — F32.A MILD DEPRESSION: ICD-10-CM

## 2018-06-28 DIAGNOSIS — E78.01 FAMILIAL HYPERCHOLESTEROLEMIA: ICD-10-CM

## 2018-06-28 DIAGNOSIS — F33.0 MAJOR DEPRESSIVE DISORDER, RECURRENT EPISODE, MILD (H): ICD-10-CM

## 2018-06-28 DIAGNOSIS — E78.5 HYPERLIPIDEMIA LDL GOAL <130: ICD-10-CM

## 2018-06-28 DIAGNOSIS — Z23 NEED FOR 23-POLYVALENT PNEUMOCOCCAL POLYSACCHARIDE VACCINE: ICD-10-CM

## 2018-06-28 DIAGNOSIS — Z23 NEED FOR PROPHYLACTIC VACCINATION AGAINST STREPTOCOCCUS PNEUMONIAE (PNEUMOCOCCUS): ICD-10-CM

## 2018-06-28 DIAGNOSIS — I42.8 NONISCHEMIC CARDIOMYOPATHY (H): ICD-10-CM

## 2018-06-28 DIAGNOSIS — Z85.830 HISTORY OF EWING'S SARCOMA: ICD-10-CM

## 2018-06-28 LAB
ALBUMIN SERPL-MCNC: 4.4 G/DL (ref 3.4–5)
ALP SERPL-CCNC: 39 U/L (ref 40–150)
ALT SERPL W P-5'-P-CCNC: 47 U/L (ref 0–70)
ANION GAP SERPL CALCULATED.3IONS-SCNC: 13 MMOL/L (ref 3–14)
AST SERPL W P-5'-P-CCNC: 24 U/L (ref 0–45)
BILIRUB SERPL-MCNC: 0.4 MG/DL (ref 0.2–1.3)
BUN SERPL-MCNC: 15 MG/DL (ref 7–30)
CALCIUM SERPL-MCNC: 9.3 MG/DL (ref 8.5–10.1)
CHLORIDE SERPL-SCNC: 104 MMOL/L (ref 94–109)
CHOLEST SERPL-MCNC: 291 MG/DL
CO2 SERPL-SCNC: 21 MMOL/L (ref 20–32)
CREAT SERPL-MCNC: 0.88 MG/DL (ref 0.66–1.25)
ERYTHROCYTE [DISTWIDTH] IN BLOOD BY AUTOMATED COUNT: 12.3 % (ref 10–15)
GFR SERPL CREATININE-BSD FRML MDRD: >90 ML/MIN/1.7M2
GLUCOSE SERPL-MCNC: 93 MG/DL (ref 70–99)
HBA1C MFR BLD: 5.3 % (ref 0–5.6)
HCT VFR BLD AUTO: 37.5 % (ref 40–53)
HDLC SERPL-MCNC: 45 MG/DL
HGB BLD-MCNC: 13.3 G/DL (ref 13.3–17.7)
LDLC SERPL CALC-MCNC: ABNORMAL MG/DL
LDLC SERPL DIRECT ASSAY-MCNC: 165 MG/DL
MCH RBC QN AUTO: 32.8 PG (ref 26.5–33)
MCHC RBC AUTO-ENTMCNC: 35.5 G/DL (ref 31.5–36.5)
MCV RBC AUTO: 93 FL (ref 78–100)
NONHDLC SERPL-MCNC: 246 MG/DL
PLATELET # BLD AUTO: 172 10E9/L (ref 150–450)
POTASSIUM SERPL-SCNC: 3.9 MMOL/L (ref 3.4–5.3)
PROT SERPL-MCNC: 7.4 G/DL (ref 6.8–8.8)
RBC # BLD AUTO: 4.05 10E12/L (ref 4.4–5.9)
SODIUM SERPL-SCNC: 138 MMOL/L (ref 133–144)
TRIGL SERPL-MCNC: 540 MG/DL
WBC # BLD AUTO: 5 10E9/L (ref 4–11)

## 2018-06-28 PROCEDURE — 90732 PPSV23 VACC 2 YRS+ SUBQ/IM: CPT | Performed by: FAMILY MEDICINE

## 2018-06-28 PROCEDURE — 99213 OFFICE O/P EST LOW 20 MIN: CPT | Mod: 25 | Performed by: FAMILY MEDICINE

## 2018-06-28 PROCEDURE — 83721 ASSAY OF BLOOD LIPOPROTEIN: CPT | Mod: 59 | Performed by: FAMILY MEDICINE

## 2018-06-28 PROCEDURE — 84270 ASSAY OF SEX HORMONE GLOBUL: CPT | Performed by: FAMILY MEDICINE

## 2018-06-28 PROCEDURE — 85027 COMPLETE CBC AUTOMATED: CPT | Performed by: FAMILY MEDICINE

## 2018-06-28 PROCEDURE — 80053 COMPREHEN METABOLIC PANEL: CPT | Performed by: FAMILY MEDICINE

## 2018-06-28 PROCEDURE — 83036 HEMOGLOBIN GLYCOSYLATED A1C: CPT | Performed by: FAMILY MEDICINE

## 2018-06-28 PROCEDURE — 90471 IMMUNIZATION ADMIN: CPT | Performed by: FAMILY MEDICINE

## 2018-06-28 PROCEDURE — 99395 PREV VISIT EST AGE 18-39: CPT | Mod: 25 | Performed by: FAMILY MEDICINE

## 2018-06-28 PROCEDURE — 84403 ASSAY OF TOTAL TESTOSTERONE: CPT | Performed by: FAMILY MEDICINE

## 2018-06-28 PROCEDURE — 36415 COLL VENOUS BLD VENIPUNCTURE: CPT | Performed by: FAMILY MEDICINE

## 2018-06-28 PROCEDURE — 80061 LIPID PANEL: CPT | Performed by: FAMILY MEDICINE

## 2018-06-28 RX ORDER — CYCLOBENZAPRINE HCL 10 MG
5-10 TABLET ORAL 3 TIMES DAILY PRN
Qty: 10 TABLET | Refills: 1 | Status: SHIPPED | OUTPATIENT
Start: 2018-06-28 | End: 2019-06-24

## 2018-06-28 RX ORDER — ZOLPIDEM TARTRATE 10 MG/1
10 TABLET ORAL
Qty: 10 TABLET | Refills: 0 | Status: SHIPPED | OUTPATIENT
Start: 2018-06-28 | End: 2018-08-02

## 2018-06-28 ASSESSMENT — ANXIETY QUESTIONNAIRES
2. NOT BEING ABLE TO STOP OR CONTROL WORRYING: SEVERAL DAYS
3. WORRYING TOO MUCH ABOUT DIFFERENT THINGS: SEVERAL DAYS
5. BEING SO RESTLESS THAT IT IS HARD TO SIT STILL: NOT AT ALL
7. FEELING AFRAID AS IF SOMETHING AWFUL MIGHT HAPPEN: NOT AT ALL
1. FEELING NERVOUS, ANXIOUS, OR ON EDGE: SEVERAL DAYS
6. BECOMING EASILY ANNOYED OR IRRITABLE: MORE THAN HALF THE DAYS
IF YOU CHECKED OFF ANY PROBLEMS ON THIS QUESTIONNAIRE, HOW DIFFICULT HAVE THESE PROBLEMS MADE IT FOR YOU TO DO YOUR WORK, TAKE CARE OF THINGS AT HOME, OR GET ALONG WITH OTHER PEOPLE: SOMEWHAT DIFFICULT
GAD7 TOTAL SCORE: 6

## 2018-06-28 ASSESSMENT — PATIENT HEALTH QUESTIONNAIRE - PHQ9: 5. POOR APPETITE OR OVEREATING: SEVERAL DAYS

## 2018-06-28 NOTE — PROGRESS NOTES
SUBJECTIVE:   CC: Agapito Yu is an 32 year old male who presents for preventative health visit.     Healthy Habits:    Do you get at least three servings of calcium containing foods daily (dairy, green leafy vegetables, etc.)? yes    Amount of exercise or daily activities, outside of work: 1-2 day(s) per week    Problems taking medications regularly No    Medication side effects: No    Have you had an eye exam in the past two years? yes    Do you see a dentist twice per year? yes    Do you have sleep apnea, excessive snoring or daytime drowsiness? Never diagnosed but feels like he has sleep apnea and he knows he snores      Also notes twisting his back 3 days ago. No radiation of pain. Has had symptoms like this before and takes a few days to improve.    Today's PHQ-2 Score:   PHQ-2 ( 1999 [employer]) 6/28/2018 12/8/2016   Q1: Little interest or pleasure in doing things 0 0   Q2: Feeling down, depressed or hopeless 0 0   PHQ-2 Score 0 0       Abuse: Current or Past(Physical, Sexual or Emotional)- No  Do you feel safe in your environment - Yes    Social History   Substance Use Topics     Smoking status: Never Smoker     Smokeless tobacco: Never Used     Alcohol use 7.2 - 10.8 oz/week     12 - 18 Standard drinks or equivalent per week      Comment: 12-18 drinks per week       If you drink alcohol do you typically have >3 drinks per day or >7 drinks per week? Yes - AUDIT SCORE:     No flowsheet data found.                      Last PSA: No results found for: PSA    Reviewed orders with patient. 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     Hyperlipidemia LDL goal <130      Major depressive disorder, recurrent episode, mild (H) 12/8/2016     Mild depression (H)      Nonischemic cardiomyopathy (H)     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 at 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12 - chang's - Dr Hernandez - EF = 45%     Nonischemic cardiomyopathy (H)       Past Surgical History:   Procedure Laterality Date     DENTAL SURGERY  2001    wisdom teeth     SURGICAL HISTORY OF -   1997    Rt Hip/Ischium/Testicle removal - Chang's       ROS:  CONSTITUTIONAL: NEGATIVE for fever, chills, change in weight  INTEGUMENTARY/SKIN: NEGATIVE for worrisome rashes, moles or lesions  EYES: NEGATIVE for vision changes or irritation  ENT: NEGATIVE for ear, mouth and throat problems  RESP: NEGATIVE for significant cough or SOB  CV: NEGATIVE for chest pain, palpitations or peripheral edema  GI: NEGATIVE for nausea, abdominal pain, heartburn, or change in bowel habits   male: negative for dysuria, hematuria, decreased urinary stream, erectile dysfunction, urethral discharge  MUSCULOSKELETAL: POSITIVE for mild back pain  NEURO: NEGATIVE for weakness, dizziness or paresthesias  PSYCHIATRIC: NEGATIVE for changes in mood or affect    OBJECTIVE:   /80 (BP Location: Right arm, Patient Position: Sitting, Cuff Size: Adult Regular)  Pulse 89  Temp 98.4  F (36.9  C) (Oral)  Ht 5' 9.1\" (1.755 m)  Wt 199 lb 6.4 oz (90.4 kg)  SpO2 97%  BMI 29.36 kg/m2  EXAM:  GENERAL: healthy, alert and no distress  EYES: Eyes grossly normal to inspection, PERRL and conjunctivae and sclerae normal  HENT: ear canals and TM's normal, nose and mouth without ulcers or lesions  NECK: no adenopathy, no asymmetry, masses, or scars and thyroid normal to palpation  RESP: lungs clear to auscultation - no rales, rhonchi or wheezes  CV: regular rate and rhythm, normal S1 S2, no S3 or S4, no murmur, click or rub, " no peripheral edema and peripheral pulses strong  ABDOMEN: soft, nontender, no hepatosplenomegaly, no masses and bowel sounds normal  MS: no gross musculoskeletal defects noted, no edema  SKIN: no suspicious lesions or rashes  NEURO: Normal strength and tone, mentation intact and speech normal  PSYCH: mentation appears normal, affect normal/bright    Diagnostic Test Results:  none     ASSESSMENT/PLAN:       ICD-10-CM    1. Encounter for routine adult medical exam with abnormal findings Z00.01 LDL cholesterol direct   2. Need for prophylactic vaccination against Streptococcus pneumoniae (pneumococcus) Z23    3. Snoring R06.83 SLEEP EVALUATION & MANAGEMENT REFERRAL - [facility]  [phone] (Age 18 and up)   4. History of Parker's sarcoma Z85.830 CBC with platelets     Testosterone Free and Total     Comprehensive metabolic panel   5. Hyperlipidemia LDL goal <130 E78.5 Lipid panel reflex to direct LDL Fasting   6. Screening for diabetes mellitus Z13.1 Hemoglobin A1c   7. Nonischemic cardiomyopathy (H): with history of cardiomyopathy, would recommend pneumococcal vaccination. I42.8 PNEUMOCOCCAL VACCINE,ADULT,SQ OR IM     Comprehensive metabolic panel   8. Need for 23-polyvalent pneumococcal polysaccharide vaccine Z23 PNEUMOCOCCAL VACCINE,ADULT,SQ OR IM   9. Strain of lumbar region, initial encounter: short term muscle relaxant; apply ice or heat as helpful. Otherwise use acetaminophen/ibuprofen for pain. Return to clinic if symptoms not improving. S39.012A cyclobenzaprine (FLEXERIL) 10 MG tablet     OFFICE/OUTPT VISIT,NEW,LEVL III   10. Other insomnia G47.09 zolpidem (AMBIEN) 10 MG tablet     OFFICE/OUTPT VISIT,NEW,LEVL III   11. Mild depression (H): symptoms stable, on Zoloft. F32.0        COUNSELING:  Reviewed preventive health counseling, as reflected in patient instructions       Regular exercise       Healthy diet/nutrition    BP Readings from Last 1 Encounters:   06/28/18 111/80  "    Estimated body mass index is 29.36 kg/(m^2) as calculated from the following:    Height as of this encounter: 5' 9.1\" (1.755 m).    Weight as of this encounter: 199 lb 6.4 oz (90.4 kg).      Weight management plan: Discussed healthy diet and exercise guidelines and patient will follow up in 12 months in clinic to re-evaluate.     reports that he has never smoked. He has never used smokeless tobacco.      Counseling Resources:  ATP IV Guidelines  Pooled Cohorts Equation Calculator  FRAX Risk Assessment  ICSI Preventive Guidelines  Dietary Guidelines for Americans, 2010  USDA's MyPlate  ASA Prophylaxis  Lung CA Screening    Mark Riojas, DO  [facility] [place]  "

## 2018-06-28 NOTE — TELEPHONE ENCOUNTER
"Requested Prescriptions   Pending Prescriptions Disp Refills     sertraline (ZOLOFT) 100 MG tablet  Last Written Prescription Date:  06/01/2018  Last Fill Quantity: 30 tablet,  # refills: 0   Last office visit: No previous visit found with prescribing provider:  06/28/2018 Beulah   Future Office Visit:     30 tablet 0     Sig: Take 1 tablet (100 mg) by mouth daily    SSRIs Protocol Passed    6/28/2018 10:56 AM       Passed - PHQ-9 score less than 5 in past 6 months    Please review last PHQ-9 score.   PHQ-9 SCORE 12/22/2017 6/1/2018   Total Score MyChart 3 (Minimal depression) 0   Total Score 3 0     FABIAN-7 SCORE 12/22/2017 6/1/2018   Total Score 0 (minimal anxiety) 3 (minimal anxiety)   Total Score 0 3              Passed - Patient is age 18 or older       Passed - Recent (6 mo) or future (30 days) visit within the authorizing provider's specialty    Patient had office visit in the last 6 months or has a visit in the next 30 days with authorizing provider or within the authorizing provider's specialty.  See \"Patient Info\" tab in inbasket, or \"Choose Columns\" in Meds & Orders section of the refill encounter.            atorvastatin (LIPITOR) 40 MG tablet  Last Written Prescription Date:  05/30/2018  Last Fill Quantity: 30 tablet,  # refills: 0   Last office visit: No previous visit found with prescribing provider:  06/28/2018 Beulah    Future Office Visit:     30 tablet 0     Sig: Take 1 tablet (40 mg) by mouth daily    Statins Protocol Failed    6/28/2018 10:56 AM       Failed - LDL on file in past 12 months    Recent Labs   Lab Test  03/27/17   1316   LDL  157*            Passed - No abnormal creatine kinase in past 12 months    No lab results found.            Passed - Recent (12 mo) or future (30 days) visit within the authorizing provider's specialty    Patient had office visit in the last 12 months or has a visit in the next 30 days with authorizing provider or within the authorizing provider's specialty.  " "See \"Patient Info\" tab in inbasket, or \"Choose Columns\" in Meds & Orders section of the refill encounter.           Passed - Patient is age 18 or older          "

## 2018-06-28 NOTE — LETTER
My Depression Action Plan  Name: Agapito Yu   Date of Birth 1985  Date: 6/28/2018    My doctor: Ed Flores   My clinic: [facility] [place]  [address]  [place] [address] [phone]          GREEN    ZONE   Good Control    What it looks like:     Things are going generally well. You have normal up s and down s. You may even feel depressed from time to time, but bad moods usually last less than a day.   What you need to do:  1. Continue to care for yourself (see self care plan)  2. Check your depression survival kit and update it as needed  3. Follow your physician s recommendations including any medication.  4. Do not stop taking medication unless you consult with your physician first.           YELLOW         ZONE Getting Worse    What it looks like:     Depression is starting to interfere with your life.     It may be hard to get out of bed; you may be starting to isolate yourself from others.    Symptoms of depression are starting to last most all day and this has happened for several days.     You may have suicidal thoughts but they are not constant.   What you need to do:     1. Call your care team, your response to treatment will improve if you keep your care team informed of your progress. Yellow periods are signs an adjustment may need to be made.     2. Continue your self-care, even if you have to fake it!    3. Talk to someone in your support network    4. Open up your depression survival kit           RED    ZONE Medical Alert - Get Help    What it looks like:     Depression is seriously interfering with your life.     You may experience these or other symptoms: You can t get out of bed most days, can t work or engage in other necessary activities, you have trouble taking care of basic hygiene, or basic responsibilities, thoughts of suicide or death that will not go away, self-injurious behavior.     What you need to do:  1. Call your care team and request a same-day  appointment. If they are not available (weekends or after hours) call your local crisis line, emergency room or 911.            Depression Self Care Plan / Survival Kit    Self-Care for Depression  Here s the deal. Your body and mind are really not as separate as most people think.  What you do and think affects how you feel and how you feel influences what you do and think. This means if you do things that people who feel good do, it will help you feel better.  Sometimes this is all it takes.  There is also a place for medication and therapy depending on how severe your depression is, so be sure to consult with your medical provider and/ or Behavioral Health Consultant if your symptoms are worsening or not improving.     In order to better manage my stress, I will:    Exercise  Get some form of exercise, every day. This will help reduce pain and release endorphins, the  feel good  chemicals in your brain. This is almost as good as taking antidepressants!  This is not the same as joining a gym and then never going! (they count on that by the way ) It can be as simple as just going for a walk or doing some gardening, anything that will get you moving.      Hygiene   Maintain good hygiene (Get out of bed in the morning, Make your bed, Brush your teeth, Take a shower, and Get dressed like you were going to work, even if you are unemployed).  If your clothes don't fit try to get ones that do.    Diet  I will strive to eat foods that are good for me, drink plenty of water, and avoid excessive sugar, caffeine, alcohol, and other mood-altering substances.  Some foods that are helpful in depression are: complex carbohydrates, B vitamins, flaxseed, fish or fish oil, fresh fruits and vegetables.    Psychotherapy  I agree to participate in Individual Therapy (if recommended).    Medication  If prescribed medications, I agree to take them.  Missing doses can result in serious side effects.  I understand that drinking alcohol,  or other illicit drug use, may cause potential side effects.  I will not stop my medication abruptly without first discussing it with my provider.    Staying Connected With Others  I will stay in touch with my friends, family members, and my primary care provider/team.    Use your imagination  Be creative.  We all have a creative side; it doesn t matter if it s oil painting, sand castles, or mud pies! This will also kick up the endorphins.    Witness Beauty  (AKA stop and smell the roses) Take a look outside, even in mid-winter. Notice colors, textures. Watch the squirrels and birds.     Service to others  Be of service to others.  There is always someone else in need.  By helping others we can  get out of ourselves  and remember the really important things.  This also provides opportunities for practicing all the other parts of the program.    Humor  Laugh and be silly!  Adjust your TV habits for less news and crime-drama and more comedy.    Control your stress  Try breathing deep, massage therapy, biofeedback, and meditation. Find time to relax each day.     My support system    Clinic Contact:  Phone number:    Contact 1:  Phone number:    Contact 2:  Phone number:    [facility]/:  Phone number:    Therapist:  Phone number:    Local crisis center:    Phone number:    Other community support:  Phone number:

## 2018-06-28 NOTE — MR AVS SNAPSHOT
After Visit Summary   6/28/2018    Agapito Yu    MRN: [id number]           Patient Information     Date Of Birth          1985        Visit Information        Provider Department      6/28/2018 9:00 AM Mark Riojas DO [facility] [place]        Today's Diagnoses     Encounter for routine adult medical exam with abnormal findings    -  1    Need for prophylactic vaccination against Streptococcus pneumoniae (pneumococcus)        Snoring        History of Parker's sarcoma        Hyperlipidemia LDL goal <130        Screening for diabetes mellitus        Nonischemic cardiomyopathy (H)        Need for 23-polyvalent pneumococcal polysaccharide vaccine        Strain of lumbar region, initial encounter        Other insomnia          Care Instructions      Preventive Health Recommendations  Male Ages 26 - 39    Yearly exam:             See your health care provider every year in order to  o   Review health changes.   o   Discuss preventive care.    o   Review your medicines if your doctor has prescribed any.    You should be tested each year for STDs (sexually transmitted diseases), if you re at risk.     After age 35, talk to your provider about cholesterol testing. If you are at risk for heart disease, have your cholesterol tested at least every 5 years.     If you are at risk for diabetes, you should have a diabetes test (fasting glucose).  Shots: Get a flu shot each year. Get a tetanus shot every 10 years.     Nutrition:    Eat at least 5 servings of fruits and vegetables daily.     Eat whole-grain bread, whole-wheat pasta and brown rice instead of white grains and rice.     Get adequate Calcium and Vitamin D.     Lifestyle    Exercise for at least 150 minutes a week (30 minutes a day, 5 days a week). This will help you control your weight and prevent disease.     Limit alcohol to one drink per day.     No smoking.     Wear sunscreen to prevent skin cancer.     See your dentist every six  months for an exam and cleaning.             Follow-ups after your visit        Additional Services     SLEEP EVALUATION & MANAGEMENT REFERRAL - [facility]  [phone] (Age 18 and up)       Please be aware that coverage of these services is subject to the terms and limitations of your health insurance plan.  Call member services at your health plan with any benefit or coverage questions.      Please bring the following to your appointment:    >>   List of current medications   >>   This referral request   >>   Any documents/labs given to you for this referral                      Follow-up notes from your care team     Return in about 1 year (around 6/28/2019) for Physical Exam.      Future tests that were ordered for you today     Open Future Orders        Priority Expected Expires Ordered    SLEEP EVALUATION & MANAGEMENT REFERRAL - [facility]  [phone] (Age 18 and up) Routine  6/28/2019 6/28/2018            Who to contact     If you have questions or need follow up information about today's clinic visit or your schedule please contact [facility] [place] directly at [phone].  Normal or non-critical lab and imaging results will be communicated to you by Stykyhart, letter or phone within 4 business days after the clinic has received the results. If you do not hear from us within 7 days, please contact the clinic through Stykyhart or phone. If you have a critical or abnormal lab result, we will notify you by phone as soon as possible.  Submit refill requests through [email or web address] or call your pharmacy and they will forward the refill request to us. Please allow 3 business days for your refill to be completed.          Additional Information About Your Visit        [email or web address] Information     [email or web address] gives you secure access to your electronic health record. If you see a primary care provider, you can also send messages to your care team and make  "appointments. If you have questions, please call your primary care clinic.  If you do not have a primary care provider, please call [phone] and they will assist you.        Care EveryWhere ID     This is your Care EveryWhere ID. This could be used by other organizations to access your [place] medical records  [id number]        Your Vitals Were     Pulse Temperature Height Pulse Oximetry BMI (Body Mass Index)       89 98.4  F (36.9  C) (Oral) 5' 9.1\" (1.755 m) 97% 29.36 kg/m2        Blood Pressure from Last 3 Encounters:   06/28/18 111/80   02/09/18 124/78   12/19/17 124/86    Weight from Last 3 Encounters:   06/28/18 199 lb 6.4 oz (90.4 kg)   12/19/17 199 lb 11.2 oz (90.6 kg)   12/07/17 199 lb 14.4 oz (90.7 kg)              We Performed the Following     CBC with platelets     Comprehensive metabolic panel     Hemoglobin A1c     Lipid panel reflex to direct LDL Fasting     OFFICE/OUTPT VISIT,NEW,LEVL III     PNEUMOCOCCAL VACCINE,ADULT,SQ OR IM     Testosterone Free and Total          Today's Medication Changes          These changes are accurate as of 6/28/18  9:41 AM.  If you have any questions, ask your nurse or doctor.               Start taking these medicines.        Dose/Directions    cyclobenzaprine 10 MG tablet   Commonly known as:  FLEXERIL   Used for:  Strain of lumbar region, initial encounter   Started by:  Mark Riojas DO        Dose:  5-10 mg   Take 0.5-1 tablets (5-10 mg) by mouth 3 times daily as needed for muscle spasms   Quantity:  10 tablet   Refills:  1       zolpidem 10 MG tablet   Commonly known as:  AMBIEN   Used for:  Other insomnia   Started by:  Mark Riojas DO        Dose:  10 mg   Take 1 tablet (10 mg) by mouth nightly as needed for sleep   Quantity:  10 tablet   Refills:  0            Where to get your medicines      These medications were sent to [facility] [address] [place]  [place], [place] - [address]  [address] [place] [address]     Phone:  [phone] "     cyclobenzaprine 10 MG tablet         Some of these will need a paper prescription and others can be bought over the counter.  Ask your nurse if you have questions.     Bring a paper prescription for each of these medications     zolpidem 10 MG tablet                Primary Care Provider Office Phone # Fax #    Ed Flores [phone] [address] [facility] [address]        Equal Access to Services     TROY JOHN : Hadii aad ku hadasho Soomaali, waaxda luqadaha, qaybta kaalmada adeegyada, waxay idiin hayaan adeeg kharash la'aan ah. So [facility] [phone].    ATENCIÓN: Si habla shikha, tiene a berger disposición servicios gratuitos de asistencia lingüística. Llame al [phone].    We comply with applicable federal civil rights laws and [place] laws. We do not discriminate on the basis of race, color, national origin, age, disability, sex, sexual orientation, or gender identity.            Thank you!     Thank you for choosing [facility] [place]  for your care. Our goal is always to provide you with excellent care. Hearing back from our patients is one way we can continue to improve our services. Please take a few minutes to complete the written survey that you may receive in the mail after your visit with us. Thank you!             Your Updated Medication List - Protect others around you: Learn how to safely use, store and throw away your medicines at www.disposemymeds.org.          This list is accurate as of 6/28/18  9:41 AM.  Always use your most recent med list.                   Brand Name Dispense Instructions for use Diagnosis    atorvastatin 40 MG tablet    LIPITOR    30 tablet    Take 1 tablet (40 mg) by mouth daily    Familial hypercholesterolemia       cyclobenzaprine 10 MG tablet    FLEXERIL    10 tablet    Take 0.5-1 tablets (5-10 mg) by mouth 3 times daily as needed for muscle spasms    Strain of lumbar region, initial encounter       lisinopril 2.5 MG tablet     PRINIVIL/Zestril    30 tablet    Take 1 tablet (2.5 mg) by mouth daily    Secondary cardiomyopathy (H)       metoprolol succinate 25 MG 24 hr tablet    TOPROL-XL    90 tablet    Take 1 tablet (25 mg) by mouth daily    Nonischemic cardiomyopathy (H)       sertraline 100 MG tablet    ZOLOFT    30 tablet    Take 1 tablet (100 mg) by mouth daily    Major depressive disorder, recurrent episode, mild (H)       VIAGRA 50 MG tablet   Generic drug:  sildenafil      TK SS TO ONE T PO PRN - ONE HOUR BEFORE SEXUAL ACTIVITY        zolpidem 10 MG tablet    AMBIEN    10 tablet    Take 1 tablet (10 mg) by mouth nightly as needed for sleep    Other insomnia

## 2018-06-29 RX ORDER — ATORVASTATIN CALCIUM 40 MG/1
40 TABLET, FILM COATED ORAL DAILY
Qty: 90 TABLET | Refills: 3 | Status: SHIPPED | OUTPATIENT
Start: 2018-06-29 | End: 2018-07-11

## 2018-06-29 RX ORDER — SERTRALINE HYDROCHLORIDE 100 MG/1
100 TABLET, FILM COATED ORAL DAILY
Qty: 90 TABLET | Refills: 1 | Status: SHIPPED | OUTPATIENT
Start: 2018-06-29 | End: 2018-12-27

## 2018-06-29 NOTE — TELEPHONE ENCOUNTER
Routing to Dr. Riojas for refill recommendations. Saw pt for Phx yesterday.  Laura Avendaño RN  [facility]

## 2018-07-02 ENCOUNTER — MYC MEDICAL ADVICE (OUTPATIENT)
Dept: FAMILY MEDICINE | Facility: CLINIC | Age: 33
End: 2018-07-02

## 2018-07-02 LAB
SHBG SERPL-SCNC: 21 NMOL/L (ref 11–80)
TESTOST FREE SERPL-MCNC: 11.08 NG/DL (ref 4.7–24.4)
TESTOST SERPL-MCNC: 428 NG/DL (ref 240–950)

## 2018-07-02 NOTE — TELEPHONE ENCOUNTER
Please see [employer] message. Please advise. Thank you.  Maddie Lira RN, BSN  [facility]

## 2018-07-03 ASSESSMENT — PATIENT HEALTH QUESTIONNAIRE - PHQ9: SUM OF ALL RESPONSES TO PHQ QUESTIONS 1-9: 4

## 2018-07-03 ASSESSMENT — ANXIETY QUESTIONNAIRES: GAD7 TOTAL SCORE: 6

## 2018-07-11 ENCOUNTER — MYC MEDICAL ADVICE (OUTPATIENT)
Dept: FAMILY MEDICINE | Facility: CLINIC | Age: 33
End: 2018-07-11

## 2018-07-11 DIAGNOSIS — E78.5 HYPERLIPIDEMIA LDL GOAL <130: Primary | ICD-10-CM

## 2018-07-11 RX ORDER — ROSUVASTATIN CALCIUM 20 MG/1
20 TABLET, COATED ORAL DAILY
Qty: 90 TABLET | Refills: 1 | Status: SHIPPED | OUTPATIENT
Start: 2018-07-11 | End: 2019-03-15

## 2018-07-11 NOTE — TELEPHONE ENCOUNTER
07/02/2018 MyChart Encounter:   Mark Riojas, DO   to Agapito Gigi        10:56 PM   Chi Altman,     Thanks for your message. You are right about weight loss and healthier eating being helpful to lower your cholesterol. However, I think it would be reasonable to try a different statin medication. What I would recommend is switching to rosuvastatin (Crestor) and increase the strength as well. Hopefully this would help bring your triglycerides down a bit as well. We could then recheck your cholesterol levels in 6 months.  If this sounds good to you, let me know and I will send a new prescription to the pharmacy for you.     Sincerely,   Mark Riojas DO   7/10/2018 10:54 PM     Routing to PCP for further review/recommendations/orders.  Please see [employer] message below and advise    Sapna Floyd RN  [place] [facility]

## 2018-07-26 ENCOUNTER — OFFICE VISIT (OUTPATIENT)
Dept: SLEEP MEDICINE | Facility: CLINIC | Age: 33
End: 2018-07-26
Attending: FAMILY MEDICINE
Payer: COMMERCIAL

## 2018-07-26 ENCOUNTER — RADIANT APPOINTMENT (OUTPATIENT)
Dept: GENERAL RADIOLOGY | Facility: CLINIC | Age: 33
End: 2018-07-26
Attending: FAMILY MEDICINE
Payer: COMMERCIAL

## 2018-07-26 ENCOUNTER — OFFICE VISIT (OUTPATIENT)
Dept: FAMILY MEDICINE | Facility: CLINIC | Age: 33
End: 2018-07-26
Payer: COMMERCIAL

## 2018-07-26 VITALS
OXYGEN SATURATION: 96 % | HEIGHT: 69 IN | RESPIRATION RATE: 14 BRPM | WEIGHT: 202 LBS | BODY MASS INDEX: 29.92 KG/M2 | SYSTOLIC BLOOD PRESSURE: 127 MMHG | DIASTOLIC BLOOD PRESSURE: 88 MMHG | HEART RATE: 113 BPM

## 2018-07-26 VITALS
HEIGHT: 69 IN | DIASTOLIC BLOOD PRESSURE: 80 MMHG | OXYGEN SATURATION: 100 % | WEIGHT: 201 LBS | TEMPERATURE: 98.4 F | HEART RATE: 110 BPM | BODY MASS INDEX: 29.77 KG/M2 | SYSTOLIC BLOOD PRESSURE: 120 MMHG

## 2018-07-26 DIAGNOSIS — R06.83 SNORING: ICD-10-CM

## 2018-07-26 DIAGNOSIS — G47.9 SLEEP DISTURBANCE: Primary | ICD-10-CM

## 2018-07-26 DIAGNOSIS — E66.3 OVERWEIGHT: ICD-10-CM

## 2018-07-26 DIAGNOSIS — M25.571 PAIN IN JOINT, ANKLE AND FOOT, RIGHT: Primary | ICD-10-CM

## 2018-07-26 DIAGNOSIS — S99.911A RIGHT ANKLE INJURY: ICD-10-CM

## 2018-07-26 PROCEDURE — 99213 OFFICE O/P EST LOW 20 MIN: CPT | Performed by: FAMILY MEDICINE

## 2018-07-26 PROCEDURE — 99204 OFFICE O/P NEW MOD 45 MIN: CPT | Performed by: INTERNAL MEDICINE

## 2018-07-26 PROCEDURE — 73610 X-RAY EXAM OF ANKLE: CPT | Mod: RT

## 2018-07-26 NOTE — NURSING NOTE
"Chief Complaint   Patient presents with     Consult     Snores per wife, gasps for breath,  No SS or cpap       Initial /88  Pulse 113  Resp 14  Ht 1.753 m (5' 9\")  Wt 91.6 kg (202 lb)  SpO2 96%  BMI 29.83 kg/m2 Estimated body mass index is 29.83 kg/(m^2) as calculated from the following:    Height as of this encounter: 1.753 m (5' 9\").    Weight as of this encounter: 91.6 kg (202 lb).    Medication Reconciliation: complete    Neck circumference: 17 inches / 43 centimeters.  Ess 10    DME: N      "

## 2018-07-26 NOTE — PATIENT INSTRUCTIONS
"MY TREATMENT INFORMATION FOR SLEEP DISTURBANCE-  Agapito Yu    DOCTOR : Simon Lewis MD  SLEEP CENTER :  [facility]    MY CONTACT NUMBER:[phone]        If I haven't had a sleep study yet, what can I expect?  A personal story from [employer]  https://www.ServiceFrame.com/watch?v=AxPLmlRpnCs    Suspected sleep apnea: Sleep study ordered.    Follow up in sleep clinic 1-2 weeks after sleep study to discuss results of sleep study and treatment options.    Patient was advised not to drive if drowsy or sleepy.    Frequently asked questions:  1. What is Obstructive Sleep Apnea (YVONNE)? YVONNE is the most common type of sleep apnea. Apnea literally means, \"without breath.\" It is characterized by repetitive pauses in breathing, despite continued effort to breathe, and is usually associated with a reduction in blood oxygen saturation. Apneas can last 10 to over 60 seconds. It is caused by narrowing or collapse of the upper airway as muscles relax during sleep. Severity of sleep apnea is determined by frequency of breathing events and their effect on your sleep and oxygen levels determined during sleep testing.   2. What are the consequences of YVONNE? Symptoms include: daytime sleepiness- possibly increasing the risk of falling asleep while driving, unrefreshing/restless sleep, snoring, insomnia, waking frequently to urinate, waking with heartburn or reflux, reduced concentration and memory, and morning headaches. Other health consequences may include development of high blood pressure and other cardiovascular disease in persons who are susceptible. Untreated YVONNE  can contribute to heart disease, stroke and diabetes.   3. What are the treatment options? In most situations, sleep apnea is a lifelong disease that must be managed with daily therapy. Medications are not effective for sleep apnea and surgery is generally not performed until other therapies have been tried. Therapy is usually tailored to the individual patient based on " many factors including your wishes as well as severity of sleep apnea and severity of obesity. Continuous Positive Airway (CPAP) is the most reliable treatment. An oral device to hold your jaw forward is usually the next most reliable option. Other options include postioning devices (to keep you off your back), weight loss, and surgery including a tongue pacing device. There is more detail about some of these options below.    Central sleep apnea is a disorder in which your breathing repeatedly stops and starts during sleep.  Central sleep apnea occurs because your brain doesn't send proper signals to the muscles that control your breathing. This condition is different from obstructive sleep apnea, in which you can't breathe normally because of upper airway obstruction. Central sleep apnea is less common than obstructive sleep apnea.  Central sleep apnea may occur as a result of other conditions, such as heart failure and stroke. Sleeping at a high altitude and pain medications also may cause central sleep apnea.        1. CPAP-  WHAT DOES IT DO AND HOW CAN I LEARN TO WEAR IT?                               BEFORE I START, CAN I WATCH A MOVIE TO GET A PLAN ON HOW TO USE CPAP?  https://www.Odimax.com/watch?y=q3U37zk196K      Continuous positive airway pressure, or CPAP, is the most effective treatment for obstructive sleep apnea. It works by blowing room air, through a mask, to hold your throat open. A decision to use CPAP is a major step forward in the pursuit of a healthier life. The successful use of CPAP will help you breathe easier, sleep better and live healthier. You can choose CPAP equipment from any durable medical equipment provider that meets your needs.  Using CPAP can be a positive experience if you keep these catalan points in mind:  1. Commitment  CPAP is not a quick fix for your problem. It involves a long-term commitment to improve your sleep and your health.    2. Communication  Stay in close  "communication with both your sleep doctor and your CPAP supplier. Ask lots of questions and seek help when you need it.    3. Consistency  Use CPAP all night, every night and for every nap. You will receive the maximum health benefits from CPAP when you use it every time that you sleep. This will also make it easier for your body to adjust to the treatment.    4. Correction  The first machine and mask that you try may not be the best ones for you. Work with your sleep doctor and your CPAP supplier to make corrections to your equipment selection. Ask about trying a different type of machine or mask if you have ongoing problems. Make sure that your mask is a good fit and learn to use your equipment properly.    5. Challenge  Tell a family member or close friend to ask you each morning if you used your CPAP the previous night. Have someone to challenge you to give it your best effort.    6. Connection   Your adjustment to CPAP will be easier if you are able to connect with others who use the same treatment. Ask your sleep doctor if there is a support group in your area for people who have sleep apnea, or look for one on the Internet.  7. Comfort   Increase your level of comfort by using a saline spray, decongestant or heated humidifier if CPAP irritates your nose, mouth or throat. Use your unit's \"ramp\" setting to slowly get used to the air pressure level. There may be soft pads you can buy that will fit over your mask straps. Look on www.CPAP.com for accessories that can help make CPAP use more comfortable.  8. Cleaning   Clean your mask, tubing and headgear on a regular basis. Put this time in your schedule so that you don't forget to do it. Check and replace the filters for your CPAP unit and humidifier.    9. Completion   Although you are never finished with CPAP therapy, you should reward yourself by celebrating the completion of your first month of treatment. Expect this first month to be your hardest period of " adjustment. It will involve some trial and error as you find the machine, mask and pressure settings that are right for you.    10. Continuation  After your first month of treatment, continue to make a daily commitment to use your CPAP all night, every night and for every nap.    CPAP-Tips to starting with success:  Begin using your CPAP for short periods of time during the day while you watch TV or read.    Use CPAP every night and for every nap. Using it less often reduces the health benefits and makes it harder for your body to get used to it.    Make small adjustments to your mask, tubing, straps and headgear until you get the right fit. Tightening the mask may actually worsen the leak.  If it leaves significant marks on your face or irritates the bridge of your nose, it may not be the best mask for you.  Speak with the person who supplied the mask and consider trying other masks. Insurances will allow you to try different masks during the first month of starting CPAP.  Insurance also covers a new mask, hose and filter about every 6 months.    Use a saline nasal spray to ease mild nasal congestion. Neti-Pot or saline nasal rinses may also help. Nasal gel sprays can help reduce nasal dryness.  Biotene mouthwash can be helpful to protect your teeth if you experience frequent dry mouth.  Dry mouth may be a sign of air escaping out of your mouth or out of the mask in the case of a full face mask.  Speak with your provider if you expect that is the case.     Take a nasal decongestant to relieve more severe nasal or sinus congestion.  Do not use Afrin (oxymetazoline) nasal spray more than 3 days in a row.  Speak with your sleep doctor if your nasal congestion is chronic.    Use a heated humidifier that fits your CPAP model to enhance your breathing comfort. Adjust the heat setting up if you get a dry nose or throat, down if you get condensation in the hose or mask.  Position the CPAP lower than you so that any  condensation in the hose drains back into the machine rather than towards the mask.    Try a system that uses nasal pillows if traditional masks give you problems.    Clean your mask, tubing and headgear once a week. Make sure the equipment dries fully.    Regularly check and replace the filters for your CPAP unit and humidifier.    Work closely with your sleep provider and your CPAP supplier to make sure that you have the machine, mask and air pressure setting that works best for you. It is better to stop using it and call your provider to solve problems than to lay awake all night frustrated with the device.    BESIDES CPAP, WHAT OTHER THERAPIES ARE THERE?      Positioning Device  Positioning devices are generally used when sleep apnea is mild and only occurs on your back.This example shows a pillow that straps around the waist. It may be appropriate for those whose sleep study shows milder sleep apnea that occurs primarily when lying flat on one's back. Preliminary studies have shown benefit but effectiveness at home may need to be verified by a home sleep test. These devices are generally not covered by medical insurance.                      Oral Appliance  What is oral appliance therapy?  An oral appliance is a small acrylic device that fits over the upper and lower teeth or tongue (similar to an orthodontic retainer or a mouth guard). This device slightly advances the lower jaw or tongue, which moves the base of the tongue forward, opens the airway, improves breathing and can effectively treat snoring and obstructive sleep apnea sleep apnea. The appliance is fabricated and customized by a qualified dentist with experience in treating snoring and sleep apnea. Oral appliances are usually well tolerated and have relatively high compliance by patients1, 2, 3.  When is an oral appliance indicated?  Oral appliance therapy is recommended as a first-line treatment for patients with primary snoring, mild sleep apnea,  and for patients with moderate sleep apnea who prefer appliance therapy to use of CPAP4, 5. Severity of sleep apnea is determined by sleep testing and is based on the number of respiratory events per hour of sleep.   How successful is oral appliance therapy?  The success rate of oral appliance therapy in patients with mild sleep apnea is 75-80% while in patients with moderate sleep apnea it is 50-70%. The chance of success in patients with severe sleep apnea is 40-50%. The research also shows that oral appliances have a beneficial effect on the cardiovascular health of YVONNE patients at the same magnitude as CPAP therapy7.  Oral appliances should be a second-line treatment in cases of severe sleep apnea, but if not completely successful then a combination therapy utilizing CPAP plus oral appliance therapy may be effective. Oral appliances tend to be effective in a broad range of patients although studies show that the patients who have the highest success are females, younger patients, those with milder disease, and less severe obesity. 3, 6.   The chances of success are lower in patients who have more severe YVONNE, are older, and those who are morbidly obese.     Example of an oral appliance   Finding a dentist that practices dental sleep medicine  Specific training is available through the American Academy of Dental Sleep Medicine for dentists interested in working in the field of sleep. To find a dentist who is educated in the field of sleep and the use of oral appliances, near you, visit the Web site of the American Academy of Dental Sleep Medicine; also see   http://www.accpstorage.org/newOrganization/patients/oralAppliances.pdf  To search for a dentist certified in these practices:  Http://aadsm.org/FindADentist.aspx?1  1. Brandon et al. Objectively measured vs self-reported compliance during oral appliance therapy for sleep-disordered breathing. Chest 2013; 144(5): 7068-0034.  2. Janee et al. Objective  measurement of compliance during oral appliance therapy for sleep-disordered breathing. Thorax 2013; 68(1): 91-96.  3. Emilie et al. Mandibular advancement devices in 620 men and women with YVONNE and snoring: tolerability and predictors of treatment success. Chest 2004; 125: 9933-2534.  4. Bari et al. Oral appliances for snoring and YVONNE: a review. Sleep 2006; 29: 244-262.  5. Bean et al. Oral appliance treatment for YVONNE: an update. J Clin Sleep Med 2014; 10(2): 215-227.  6. eBcki et al. Predictors of OSAH treatment outcome. J Dent Res 2007; 86: 0200-1576.    Nasal Valves                 Nasal valves may not be effective if you have frequent nasal congestion or have difficulty breathing through your nose. They may be an option for mild apnea if other options are not well tolerated. The efficacy of these devices is generally less than CPAP or oral appliances.      Weight Loss:    Weight management is a personal decision.  If you are interested in exploring weight loss strategies, the following discussion covers the impact on weight loss on sleep apnea and the approaches that may be successful.    Weight loss decreases severity of sleep apnea in most people with obesity. For those with mild obesity who have developed snoring with weight gain, even 15-30 pound weight loss can improve and occasionally eliminate sleep apnea.  Structured and life-long dietary and health habits are necessary to lose weight and keep healthier weight levels.     Though there may be significant health benefits from weight loss, long-term weight loss is very difficult to achieve- studies show success with dietary management in less than 10% of people. In addition, substantial weight loss may require years of dietary control and may be difficult if patients have severe obesity. In these cases, surgical management may be considered.  Finally, older individuals who have tolerated obesity without health complications may be less  likely to benefit from weight loss strategies.    Your BMI is Body mass index is 29.83 kg/(m^2).  Body mass index (BMI) is one way to tell whether you are at a healthy weight, overweight, or obese. It measures your weight in relation to your height.  A BMI of 18.5 to 24.9 is in the healthy range. A person with a BMI of 25 to 29.9 is considered overweight, and someone with a BMI of 30 or greater is considered obese. More than two-thirds of American adults are considered overweight or obese.  Being overweight or obese increases the risk for further weight gain. Excess weight may lead to heart disease and diabetes.  Creating and following plans for healthy eating and physical activity may help you improve your health.  Weight control is part of healthy lifestyle and includes exercise, emotional health, and healthy eating habits. Careful eating habits lifelong are the mainstay of weight control. Though there are significant health benefits from weight loss, long-term weight loss with diet alone may be very difficult to achieve- studies show long-term success with dietary management in less than 10% of people. Attaining a healthy weight may be especially difficult to achieve in those with severe obesity. In some cases, medications, devices and surgical management might be considered.  What can you do?  If you are overweight or obese and are interested in methods for weight loss, you should discuss this with your provider.     Consider reducing daily calorie intake by 500 calories.     Keep a food journal.     Avoiding skipping meals, consider cutting portions instead.    Diet combined with exercise helps maintain muscle while optimizing fat loss. Strength training is particularly important for building and maintaining muscle mass. Exercise helps reduce stress, increase energy, and improves fitness. Increasing exercise without diet control, however, may not burn enough calories to loose weight.       Start walking three  days a week 10-20 minutes at a time    Work towards walking thirty minutes five days a week     Eventually, increase the speed of your walking for 1-2 minutes at time    In addition, we recommend that you review healthy lifestyles and methods for weight loss available through the National Institutes of Health patient information sites:  http://win.niddk.nih.gov/publications/index.htm    And look into health and wellness programs that may be available through your health insurance provider, employer, local community center, or bebe club.    Weight management plan: Patient was referred to their PCP to discuss a diet and exercise plan.    Surgery:    Upper Airway Surgery for YVONNE  Surgery for YVONNE is a second-line treatment option in the management of sleep apnea.  Surgery should be considered for patients who are having a difficult time tolerating CPAP.    Surgery for YVONNE is directed at areas that are responsible for narrowing or complete obstruction of the airway during sleep.  There are a wide range of procedures available to enlarge and/or stabilize the airway to prevent blockage of breathing in the three major areas where it can occur: the palate, tongue, and nasal regions.  Successful surgical treatment depends on the accurate identification of the factors responsible for obstructive sleep apnea in each person.  A personalized approach is required because there is no single treatment that works well for everyone.  Because of anatomic variation, consultation with an examination by a sleep surgeon is a critical first step in determining what surgical options are best for each patient.  In some cases, examination during sedation may be recommended in order to guide the selection of procedures.  Patients will be counseled about risks and benefits as well as the typical recovery course after surgery. Surgery is typically not a cure for a person s YVONNE.  However, surgery will often significantly improve one s YVONNE  severity (termed  success rate ).  Even in the absence of a cure, surgery will decrease the cardiovascular risk associated with OSA7; improve overall quality of life8 (sleepiness, functionality, sleep quality, etc).          Palate Procedures:  Patients with YVONNE often have narrowing of their airway in the region of their tonsils and uvula.  The goals of palate procedures are to widen the airway in this region as well as to help the tissues resist collapse.  Modern palate procedure techniques focus on tissue conservation and soft tissue rearrangement, rather than tissue removal.  Often the uvula is preserved in this procedure. Residual sleep apnea is common in patient after pharyngoplasty with an average reduction in sleep apnea events of 33%2.      Tongue Procedures:  While patients are awake, the muscles that surround the throat are active and keep this region open for breathing. These muscles relax during sleep, allowing the tongue and other structures to collapse and block breathing.  There are several different tongue procedures available.  Selection of a tongue base procedure depends on characteristics seen on physical exam.  Generally, procedures are aimed at removing bulky tissues in this area or preventing the back of the tongue from falling back during sleep.  Success rates for tongue surgery range from 50-62%3.    Hypoglossal Nerve Stimulation:  Hypoglossal nerve stimulation has recently received approval from the [place] Food and Drug Administration for the treatment of obstructive sleep apnea.  This is based on research showing that the system was safe and effective in treating sleep apnea6.  Results showed that the median AHI score decreased 68%, from 29.3 to 9.0. This therapy uses an implant system that senses breathing patterns and delivers mild stimulation to airway muscles, which keeps the airway open during sleep.  The system consists of three fully implanted components: a small generator  (similar in size to a pacemaker), a breathing sensor, and a stimulation lead.  Using a small handheld remote, a patient turns the therapy on before bed and off upon awakening.    Candidates for this device must be greater than 22 years of age, have moderate to severe YVONNE (AHI between 20-65), BMI less than 32, have tried CPAP/oral appliance without success, and have appropriate upper airway anatomy (determined by a sleep endoscopy performed by Dr. Martin).    Hypoglossal Nerve Stimulation Pathway:    The sleep surgeon s office will work with the patient through the insurance prior-authorization process (including communications and appeals).    Nasal Procedures:  Nasal obstruction can interfere with nasal breathing during the day and night.  Studies have shown that relief of nasal obstruction can improve the ability of some patients to tolerate positive airway pressure therapy for obstructive sleep apnea1.  Treatment options include medications such as nasal saline, topical corticosteroid and antihistamine sprays, and oral medications such as antihistamines or decongestants. Non-surgical treatments can include external nasal dilators for selected patients. If these are not successful by themselves, surgery can improve the nasal airway either alone or in combination with these other options.      Combination Procedures:  Combination of surgical procedures and other treatments may be recommended, particularly if patients have more than one area of narrowing or persistent positional disease.  The success rate of combination surgery ranges from 66-80%2,3.      1. Logan PEREZ. The Role of the Nose in Snoring and Obstructive Sleep Apnoea: An Update.  Eur Arch Otorhinolaryngol. 2011; 268: 1365-73.  2.  Niyah SM; Mark JA; Emma JR; Pallanch JF; Jerica GIRALDO; Verenice GANDARA; Kevin PENA. Surgical modifications of the upper airway for obstructive sleep apnea in adults: a systematic review and meta-analysis. SLEEP  2010;33(10):0244-9817. Marilia DAVE. Hypopharyngeal surgery in obstructive sleep apnea: an evidence-based medicine review.  Arch Otolaryngol Head Neck Surg. 2006 Feb;132(2):206-13.  3. Zana SOTO, Aldair Y, Carroll JANET. The efficacy of anatomically based multilevel surgery for obstructive sleep apnea. Otolaryngol Head Neck Surg. 2003 Oct;129(4):327-35.  4. Kezirian E, Goldberg A. Hypopharyngeal Surgery in Obstructive Sleep Apnea: An Evidence-Based Medicine Review. Arch Otolaryngol Head Neck Surg. 2006 Feb;132(2):206-13.  5. Ananda COOL et al. Upper-Airway Stimulation for Obstructive Sleep Apnea.  N Engl J Med. 2014 Jan 9;370(2):139-49.  6. Willis Y et al. Increased Incidence of Cardiovascular Disease in Middle-aged Men with Obstructive Sleep Apnea. Am J Respir Crit Care Med; 2002 166: 159-165  7. Kristin EM et al. Studying Life Effects and Effectiveness of Palatopharyngoplasty (SLEEP) study: Subjective Outcomes of Isolated Uvulopalatopharyngoplasty. Otolaryngol Head Neck Surg. 2011; 144: 623-631.  8.   Your blood pressure was checked while you were in clinic today.  Please read the guidelines below about what these numbers mean and what you should do about them.  Your systolic blood pressure is the top number.  This is the pressure when the heart is pumping.  Your diastolic blood pressure is the bottom number.  This is the pressure in between beats.  If your systolic blood pressure is less than 120 and your diastolic blood pressure is less than 80, then your blood pressure is normal. There is nothing more that you need to do about it  If your systolic blood pressure is 120-139 or your diastolic blood pressure is 80-89, your blood pressure may be higher than it should be.  You should have your blood pressure re-checked within a year by a primary care provider.  If your systolic blood pressure is 140 or greater or your diastolic blood pressure is 90 or greater, you may have high blood pressure.  High blood pressure is treatable,  but if left untreated over time it can put you at risk for heart attack, stroke, or kidney failure.  You should have your blood pressure re-checked by a primary care provider within the next four weeks.    Good Sleep hygiene tips (American Academy of Sleep Medicine):  Maintain a regular sleep-wake routine    Go to bed at the same time. Wake up at the same time. Ideally, your schedule will remain the same (+/- 20 minutes) every night of the week.  Avoid naps if possible    Naps decrease the  Sleep Debt  that is so necessary for easy sleep onset.    Each of us needs a certain amount of sleep per 24-hour period. We need that amount, and we don t need more than that.    When we take naps, it decreases the amount of sleep that we need the next night   which may cause sleep fragmentation and difficulty initiating sleep, and may lead to insomnia.  Don t stay in bed awake for more than 15-20 minutes (Stimulus control)    If you find your mind racing, or worrying about not being able to sleep during the middle of the night, get out of bed, and sit in a chair in the dark. Do your mind racing in the chair until you are sleepy, then return to bed. No TV, or phone/Ipad, or computer or internet or eat during these periods! That will just stimulate you more than desired.    If this happens several times during the night, that is OK. Just maintain your regular wake time, and try to avoid naps.  Don t watch TV, phone, computer, video games or read in bed or eat in bed.    When you watch TV or read in bed or eat in bed, you associate the bed with wakefulness.    The bed is reserved for two things   sleep and hanky panky.  Drink caffeinated drinks with caution    The effects of caffeine may last for several hours after ingestion. Caffeine can fragment sleep, and cause difficulty initiating sleep. If you drink caffeine, use it only before noon.    Remember that soda and tea contain caffeine as well.  Avoid inappropriate substances that  interfere with sleep    Cigarettes, alcohol, and over-the-counter medications may cause fragmented sleep.  Exercise regularly    Exercise promotes continuous sleep.    Rigorous exercise (close to bedtime cause) circulates endorphins into the body which may cause difficulty initiating sleep.   Have a quiet, comfortable bedroom    Set your bedroom thermostat at a comfortable temperature. Generally, a little cooler is better than a little warmer.    Turn off the TV and other extraneous noise that may disrupt sleep. Background  white noise  like a fan is OK.    If your pets awaken you, keep them outside the bedroom.    Your bedroom should be dark. Turn off bright lights.    Have a comfortable mattress.  If you are a  clock watcher  at night, hide the clock.      Have a comfortable pre-bedtime routine    A warm bath, shower    Meditation, or quiet time    Wind-down 20 minutes prior of bedtime.      Your BMI is Body mass index is 29.83 kg/(m^2).  Weight management is a personal decision.  If you are interested in exploring weight loss strategies, the following discussion covers the approaches that may be successful. Body mass index (BMI) is one way to tell whether you are at a healthy weight, overweight, or obese. It measures your weight in relation to your height.  A BMI of 18.5 to 24.9 is in the healthy range. A person with a BMI of 25 to 29.9 is considered overweight, and someone with a BMI of 30 or greater is considered obese. More than two-thirds of American adults are considered overweight or obese.  Being overweight or obese increases the risk for further weight gain. Excess weight may lead to heart disease and diabetes.  Creating and following plans for healthy eating and physical activity may help you improve your health.  Weight control is part of healthy lifestyle and includes exercise, emotional health, and healthy eating habits. Careful eating habits lifelong are the mainstay of weight control. Though there  are significant health benefits from weight loss, long-term weight loss with diet alone may be very difficult to achieve- studies show long-term success with dietary management in less than 10% of people. Attaining a healthy weight may be especially difficult to achieve in those with severe obesity. In some cases, medications, devices and surgical management might be considered.  What can you do?  If you are overweight or obese and are interested in methods for weight loss, you should discuss this with your provider.     Consider reducing daily calorie intake by 500 calories.     Keep a food journal.     Avoiding skipping meals, consider cutting portions instead.    Diet combined with exercise helps maintain muscle while optimizing fat loss. Strength training is particularly important for building and maintaining muscle mass. Exercise helps reduce stress, increase energy, and improves fitness. Increasing exercise without diet control, however, may not burn enough calories to loose weight.       Start walking three days a week 10-20 minutes at a time    Work towards walking thirty minutes five days a week     Eventually, increase the speed of your walking for 1-2 minutes at time    In addition, we recommend that you review healthy lifestyles and methods for weight loss available through the National Institutes of Health patient information sites:  http://win.niddk.nih.gov/publications/index.htm    And look into health and wellness programs that may be available through your health insurance provider, employer, local community center, or bebe club.    Weight management plan: Patient was referred to their PCP to discuss a diet and exercise plan.   Your blood pressure was checked while you were in clinic today.  Please read the guidelines below about what these numbers mean and what you should do about them.  Your systolic blood pressure is the top number.  This is the pressure when the heart is pumping.  Your  diastolic blood pressure is the bottom number.  This is the pressure in between beats.  If your systolic blood pressure is less than 120 and your diastolic blood pressure is less than 80, then your blood pressure is normal. There is nothing more that you need to do about it  If your systolic blood pressure is 120-139 or your diastolic blood pressure is 80-89, your blood pressure may be higher than it should be.  You should have your blood pressure re-checked within a year by a primary care provider.  If your systolic blood pressure is 140 or greater or your diastolic blood pressure is 90 or greater, you may have high blood pressure.  High blood pressure is treatable, but if left untreated over time it can put you at risk for heart attack, stroke, or kidney failure.  You should have your blood pressure re-checked by a primary care provider within the next four weeks.

## 2018-07-26 NOTE — PROGRESS NOTES
"  SUBJECTIVE:                                                    Agapito Yu is a 32 year old male who presents to clinic today for the following health issues:      Right Ankle Pain    Onset: 3 months    Description:   Location: right ankle  Character: dull ache and throbbing pain with more use    Intensity: moderate    Progression of Symptoms: waxing and waning    Accompanying Signs & Symptoms:  Other symptoms: swelling and bruising initially - but that has resolved    History:   Previous similar pain: no       Precipitating factors:   Trauma or overuse: YES- slipped on ice and fell and smashed it against some rocks    Alleviating factors:  Improved by: rest/inactivity and ice    Therapies Tried and outcome: ibuprofen, ice and rest with some intermittent relief      Problem list and histories reviewed & adjusted, as indicated.  Additional history: as documented    Patient Active Problem List   Diagnosis     Familial hypercholesterolemia     History of Parker's sarcoma     Major depressive disorder, recurrent episode, mild (H)     Parker's sarcoma (H)     Mild depression (H)     Hyperlipidemia LDL goal <130     Nonischemic cardiomyopathy (H)     Past Surgical History:   Procedure Laterality Date     DENTAL SURGERY  2001    wisdom teeth     SURGICAL HISTORY OF -   1997    Rt Hip/Ischium/Testicle removal - Parker's       Social History   Substance Use Topics     Smoking status: Never Smoker     Smokeless tobacco: Never Used     Alcohol use 7.2 - 10.8 oz/week      Comment: 12-18 drinks per week      Family History   Problem Relation Age of Onset     Hyperlipidemia Father      Hyperlipidemia Brother      Colon Cancer Paternal Grandmother            ROS:  Constitutional, HEENT, cardiovascular, pulmonary, gi and gu systems are negative, except as otherwise noted.    OBJECTIVE:     /80 (BP Location: Right arm, Patient Position: Chair, Cuff Size: Adult Regular)  Pulse 110  Temp 98.4  F (36.9  C) (Oral)  Ht 5' 9\" " (1.753 m)  Wt 201 lb (91.2 kg)  SpO2 100%  BMI 29.68 kg/m2  Body mass index is 29.68 kg/(m^2).  GENERAL: healthy, alert and no distress  MS: right ankle normal appearing without swelling, bruising, or redness. Full range of motion. Some mild tenderness just anterior to lateral malleoli    Diagnostic Test Results:  Xray - right ankle: no apparent abnormalities, final read pending.    ASSESSMENT/PLAN:   1. Right ankle injury: no apparent fracture, recommend continuing symptomatic management. Could consider physical therapy or referral to podiatry if symptoms do not improve.   - XR Ankle Right G/E 3 Views; Future    Mark Riojas,   [facility] [place]

## 2018-07-26 NOTE — PROGRESS NOTES
[facility]  Outpatient Sleep Medicine Consultation  July 26, 2018      Name: Agapito Yu MRN# [id number]   Age: 32 year old YOB: 1985     Date of Consultation: July 26, 2018  Consultation is requested by: Mark Riojas DO  [address] [place] [address]  Primary care provider: Ed Flores  [place] [facility]: [facility]       Reason for Sleep Consult:     Agapito Yu is a 32 year old male nightly witnessed apnea, snoring, gasping, choking, poor quality of sleep and excessive daytime sleepiness.         Assessment and Plan:     Summary Sleep Diagnoses/Recommendations:    1. Sleep Disturbance:  High suspicion of sleep disordered breathing based on patient's symptoms (snoring, excessive daytime sleepiness, witnessed apneas), high BMI, neck circumference and oropharyngeal examination setting of nonischemic cardiomyopathy with ejection fraction 40-45% and strong family history of SBD. Will schedule PSG with PAP titration in second half if patient meets criteria for YVONNE in first half of PSG TCM CO2.  No ASV titration due to low ejection fraction of 40-45%.. We also discussed the pathophysiology of sleep disordered breathing and the importance of treating it if S/he should have it. Patient is advised not to drive if he/she feels drowsy or sleepy. Patient was advised to reduce the amount of alcohol intake as it worsens sleep disordered breathing and cardiomyopathy and he expressed understanding.  Follow up after sleep study to discuss the result of sleep study and treatment options.  He will take one dose of prescribed Ambien during sleep study.    2. Overweight:  Counseled regarding weight loss through diet modification and increased physical activity. Patient was given instuctions of weight loss and advised to follow up her PCP for further weight loss interventions.      Orders Placed This Encounter   Procedures     Comprehensive Sleep Study       Summary Counseling:  See  instructions    Counseling included a comprehensive review of diagnostic and therapeutic strategies as well as risks of inadequate therapy.  Educational materials provided in instructions.    All questions were answered.  The patient indicates understanding of the above issues and agrees with the plan set forth.           History of Present Illness:     Agapito Yu is a 32 year old male with history of nonischemic cardiomyopathy with ejection fraction 40-45%, hyperlipidemia, depression, Parker's sarcoma of right hip who presents to the [facility] in [place] with complains of sleep disturbance. I was asked to see Mr. Yu regarding sleep apnea by Dr. Riojas.   Patient complaints loud snoring, witnessed apnea, waking up gasping air, excessive daytime sleepiness and tiredness, unrefreshing  sleep, dry mouth and throat, acid reflux for many years.  He denies and morning headaches, difficult falling asleep.  He wakes up 1-2 times a night for bathroom and rolling over.  He denies restless leg and leg kicking at night.      Please see below for sleep ROS details.    PREVIOUS IN- LAB or HOME SLEEP STUDIES:  None      SLEEP-WAKE SCHEDULE:     Agapito Yu     -Describes themself as neither a morning or night person;      -ON WEEKDAYS, goes to sleep at 10:00 PM during the week; awakens  5:30 AM with an alarm; falls asleep in 15 minutes; denies difficulty falling asleep.     -ON WEEKENDS, goes to sleep at 11:00 PM and wakes up at 9:00 AM without an alarm; falls asleep in 15 minutes.       -Awakens 1-2 time a night for 1 minutes before falling back to sleep; awakens to go to the bathroom and uncertain reasons.      -Total sleep time: 8-10 hours per night.    -Naps 1-2 times/days per week for 120 minutes, feels refreshed after naps; takes no inadvertant naps.       BEDTIME ACTIVITIES AND SHIFT WORK:    Agapito Yu    -does use electronics in bed, watch TV in bed, read in bed and work in bed and does not eat  in bed.     -does not do shift work.  He/she works day shifts.      Occupation: IT     [employer]       SLEEP APNEA: [place] score: 5       INSOMNIA:  Insomnia severity score: N/A       SLEEPINESS: [place] sleepiness scale (ESS): 10   Drowsy driving yes for longer  but no near accidents          PHQ9: N/A    SLEEP COMPLAINTS:   Snoring- 7 days/week  Witness apnea: Yes  Gasping/Choking: Yes  Excessive daytime sleep: Yes  Toss/turn: No  Excessive tiredness/fatigue:  Yes  Morning headaches: No  Dry mouth/throat: Yes  Dyspnea: No  Coexisting Lung disease: No    Coexisting Heart disease: yes    Does patient have a bed partner: Yes  Has bed partner been sleeping separately because of snoring:  No            RLS Screen: When you try to relax in the evening or sleep at  night, do you ever have unpleasant, restless feelings in your  legs that can be relieved by walking or movement? No    Periodic limb movement: No    Narcolepsy:       denies sudden urges of sleep attacks     denies cataplexy     denies sleep paralysis      denies hallucinations     Sleep Behaviors:     denies leg symptoms/movements     denies motor restlessness     denies night terrors     yes bruxism     denies automatic behaviors    Other subjective complaints:     denies anxiety or rumination      denies pain and discomfort at  night     denies waking up with heart pounding or racing     yes GERD/heartburn         Parasomnia:   NREM - denies recurrent persistent confusional arousal, night eating, sleep walking or sleep terrors. Patient does sleep eating when his drink.  REM  - denies dream enactment; no injuries.     Safety: None             Medications:     Current Outpatient Prescriptions   Medication Sig     cyclobenzaprine (FLEXERIL) 10 MG tablet Take 0.5-1 tablets (5-10 mg) by mouth 3 times daily as needed for muscle spasms     lisinopril (PRINIVIL/ZESTRIL) 2.5 MG tablet Take 1 tablet (2.5 mg) by mouth daily     metoprolol succinate (TOPROL-XL) 25  MG 24 hr tablet Take 1 tablet (25 mg) by mouth daily     rosuvastatin (CRESTOR) 20 MG tablet Take 1 tablet (20 mg) by mouth daily     sertraline (ZOLOFT) 100 MG tablet Take 1 tablet (100 mg) by mouth daily     VIAGRA 50 MG cap/tab TK SS TO ONE T PO PRN - ONE HOUR BEFORE SEXUAL ACTIVITY     zolpidem (AMBIEN) 10 MG tablet Take 1 tablet (10 mg) by mouth nightly as needed for sleep     No current facility-administered medications for this visit.         Medication that can affect sleep: None     No Known Allergies         Past Medical History:     Does not need 02 supplement at night     Past Medical History:   Diagnosis Date     Chang's sarcoma (H) 1997    [place] - Rt Hip     Hyperlipidemia LDL goal <130      Major depressive disorder, recurrent episode, mild (H) 12/8/2016     Mild depression (H)      Nonischemic cardiomyopathy (H)     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 at age 12 - chang's - Dr Hernandez - EF = 45%     Nonischemic cardiomyopathy (H)                Past Surgical History:    No previous upper airway surgery     Past Surgical History:   Procedure Laterality Date     DENTAL SURGERY  2001    wisdom teeth     SURGICAL HISTORY OF -   1997    Rt Hip/Ischium/Testicle removal - Chang's            Social History:     Social History   Substance Use Topics     Smoking status: Never Smoker     Smokeless tobacco: Never Used     Alcohol use 7.2 - 10.8 oz/week      Comment: 12-18 drinks per week          Chemical History:     Tobacco: No     Uses 6 cups/day of coffee, 1 cups/day of tea, 2 sodas/day. Last caffeine intake is usually before 3 pm    EtOH: Yes, drinks 2-3 beer a day.  Recreational Drugs: smoker weed    Psych Hx:   Depression     Current dangers to self or others: None           Family History:     Family History   Problem Relation Age of Onset     Hyperlipidemia Father      Hyperlipidemia Brother      Colon Cancer Paternal Grandmother         Sleep Family Hx:        RLS- No  YVONNE - both parents, suspect 2  "brothers  Insomnia - No  Parasomnia - No         Review of Systems:     A complete 10 point review of systems was negative other than HPI or as commented below:   Patient denies chest pain, dyspnea with activity and or rest, wheezing, abdominal pain, n&v, fever, chills, dysuria, leg pain or swelling. Patient is also denies ear pain, sore throat, postnasal drip, running nose, dry cough.      Agapito Yu has gained 10 pounds in the last year.            Physical Examination:   /88  Pulse 113  Resp 14  Ht 1.753 m (5' 9\")  Wt 91.6 kg (202 lb)  SpO2 96%  BMI 29.83 kg/m2     Neck Circumference: 43 cm   Constitutional: . Awake, alert, cooperative, in no apparent distress  Mood: euthymic; affect congruent with full range and intensity.  Attention/Concentration:  Normal   Eyes: Pupils round and reactive. No icterus.  ENT: Mallampati Class: IV.   Tonsillar Stage: 1  hidden by pillars  Clear nasal passages. Enlarged inferior turbinates. No deviated septum.  Oropharynx: No high arched palate. No pharyngeal erythema or exudates, micrognathia, , elongated uvula. No lateral narrowing  Tongue: No relative macroglossia   Dentition: Good.  Dentures: None  Neck: Supple, no thyroid enlargement.   Cardiovascular: Regular S1 and S2, no murmurs or gallops.    Pulmonary:  Chest symmetric, lungs clear bilaterally and no crackles, wheezes or rales.  Abdomen: Soft, obese, non tender.  Extremities:  No pedal edema.  Muscle/joint: Strength and tone normal   Skin:  No rash or significant lesions examined areas of skin.   Neurologic: Alert, oriented x3, no focal neurological deficit.           Data: All pertinent previous laboratory data reviewed     No results found for: PH, PHARTERIAL, PO2, [id number], SAT, PCO2, HCO3, BASEEXCESS, PEÑA, BEB  Lab Results   Component Value Date    TSH 1.52 03/27/2017     Lab Results   Component Value Date    GLC 93 06/28/2018    GLC 83 12/29/2017     Lab Results   Component Value Date    HGB 13.3 " 06/28/2018    HGB 13.9 03/27/2017     Lab Results   Component Value Date    BUN 15 06/28/2018    BUN 20 12/29/2017    CR 0.88 06/28/2018    CR 0.88 12/29/2017     Lab Results   Component Value Date    CO2 21 06/28/2018    CO2 24 12/29/2017     No results found for: RAE      Echocardiography: 11/16/17  The left ventricle is normal in size. There is normal left ventricular wall  thickness. Left ventricular systolic function is mildly reduced. E by E prime  ratio is between 8 and 15, which is indeterminate for assessment of left  ventricular filling pressures. The visual ejection fraction is estimated at  40-45%. There is mild global hypokinesia of the left ventricle.     The right ventricle is normal size. The right ventricular systolic function is  normal.    Normal left atrial size. Right atrial size is normal. There is no color  Doppler evidence of an atrial shunt.    Chest x-ray: No    PFT: No        Copy to: Ed Flores  Copy to: Mark Lewis MD 7/26/2018   [place] [address] [place] [address]   [phone] Clinic    Total time spent with patient: 43 minutes with this patient today in which 25 minutes was spent in counseling/coordination of care and going over planned testing and recommendations.

## 2018-07-26 NOTE — MR AVS SNAPSHOT
After Visit Summary   7/26/2018    Agapito Yu    MRN: [id number]           Patient Information     Date Of Birth          1985        Visit Information        Provider Department      7/26/2018 10:00 AM Mark Riojas, DO [facility]        Today's Diagnoses     Pain in joint, ankle and foot, right    -  1       Follow-ups after your visit        Your next 10 appointments already scheduled     Jul 26, 2018  5:00 PM CDT   [place] with Simon Lewis [place]   [facility] [place]    [address]  [facility] [address] [phone]              Who to contact     If you have questions or need follow up information about today's clinic visit or your schedule please contact [facility] directly at [phone].  Normal or non-critical lab and imaging results will be communicated to you by MyChart, letter or phone within 4 business days after the clinic has received the results. If you do not hear from us within 7 days, please contact the clinic through MyChart or phone. If you have a critical or abnormal lab result, we will notify you by phone as soon as possible.  Submit refill requests through [employer] or call your pharmacy and they will forward the refill request to us. Please allow 3 business days for your refill to be completed.          Additional Information About Your Visit        MyChart Information     [employer] gives you secure access to your electronic health record. If you see a primary care provider, you can also send messages to your care team and make appointments. If you have questions, please call your primary care clinic.  If you do not have a primary care provider, please call [phone] and they will assist you.        Care EveryWhere ID     This is your Care EveryWhere ID. This could be used by other organizations to access your [place] medical records  [id number]    "     Your Vitals Were     Pulse Temperature Height Pulse Oximetry BMI (Body Mass Index)       110 98.4  F (36.9  C) (Oral) 5' 9\" (1.753 m) 100% 29.68 kg/m2        Blood Pressure from Last 3 Encounters:   07/26/18 120/80   06/28/18 111/80   02/09/18 124/78    Weight from Last 3 Encounters:   07/26/18 201 lb (91.2 kg)   06/28/18 199 lb 6.4 oz (90.4 kg)   12/19/17 199 lb 11.2 oz (90.6 kg)               Primary Care Provider Office Phone # Fax #    Ed Flores [phone] [address] [place] [id number]        Equal Access to Services     MARIEL JOHN : [facility] burns Soeric, waaxda luqadaha, qaybta kaalmada adeegyada, roger morgan . So [facility] [phone].    ATENCIÓN: Si habla español, tiene a berger disposición servicios gratuitos de asistencia lingüística. Llame al [phone].    We comply with applicable federal civil rights laws and [place] laws. We do not discriminate on the basis of race, color, national origin, age, disability, sex, sexual orientation, or gender identity.            Thank you!     Thank you for choosing [facility]  for your care. Our goal is always to provide you with excellent care. Hearing back from our patients is one way we can continue to improve our services. Please take a few minutes to complete the written survey that you may receive in the mail after your visit with us. Thank you!             Your Updated Medication List - Protect others around you: Learn how to safely use, store and throw away your medicines at www.disposemymeds.org.          This list is accurate as of 7/26/18  1:04 PM.  Always use your most recent med list.                   Brand Name Dispense Instructions for use Diagnosis    cyclobenzaprine 10 MG tablet    FLEXERIL    10 tablet    Take 0.5-1 tablets (5-10 mg) by mouth 3 times daily as needed for muscle spasms    Strain of lumbar region, initial encounter       lisinopril 2.5 MG " tablet    PRINIVIL/Zestril    30 tablet    Take 1 tablet (2.5 mg) by mouth daily    Secondary cardiomyopathy (H)       metoprolol succinate 25 MG 24 hr tablet    TOPROL-XL    90 tablet    Take 1 tablet (25 mg) by mouth daily    Nonischemic cardiomyopathy (H)       rosuvastatin 20 MG tablet    CRESTOR    90 tablet    Take 1 tablet (20 mg) by mouth daily    Hyperlipidemia LDL goal <130       sertraline 100 MG tablet    ZOLOFT    90 tablet    Take 1 tablet (100 mg) by mouth daily    Major depressive disorder, recurrent episode, mild (H)       VIAGRA 50 MG tablet   Generic drug:  sildenafil      TK SS TO ONE T PO PRN - ONE HOUR BEFORE SEXUAL ACTIVITY        zolpidem 10 MG tablet    AMBIEN    10 tablet    Take 1 tablet (10 mg) by mouth nightly as needed for sleep    Other insomnia

## 2018-07-26 NOTE — MR AVS SNAPSHOT
"              After Visit Summary   7/26/2018    Agapito Yu    MRN: [id number]           Patient Information     Date Of Birth          1985        Visit Information        Provider Department      7/26/2018 5:00 PM Simon Lewis MD [facility] [place]        Today's Diagnoses     Sleep disturbance    -  1    Snoring        Overweight          Care Instructions    MY TREATMENT INFORMATION FOR SLEEP DISTURBANCE-  Agapito Yu    DOCTOR : Simon Lewis MD  SLEEP CENTER :  [place]    MY CONTACT NUMBER:[phone]        If I haven't had a sleep study yet, what can I expect?  A personal story from [employer]  https://www.[employer].com/watch?v=AxPLmlRpnCs    Suspected sleep apnea: Sleep study ordered.    Follow up in sleep clinic 1-2 weeks after sleep study to discuss results of sleep study and treatment options.    Patient was advised not to drive if drowsy or sleepy.    Frequently asked questions:  1. What is Obstructive Sleep Apnea (YVONNE)? YVONNE is the most common type of sleep apnea. Apnea literally means, \"without breath.\" It is characterized by repetitive pauses in breathing, despite continued effort to breathe, and is usually associated with a reduction in blood oxygen saturation. Apneas can last 10 to over 60 seconds. It is caused by narrowing or collapse of the upper airway as muscles relax during sleep. Severity of sleep apnea is determined by frequency of breathing events and their effect on your sleep and oxygen levels determined during sleep testing.   2. What are the consequences of YVONNE? Symptoms include: daytime sleepiness- possibly increasing the risk of falling asleep while driving, unrefreshing/restless sleep, snoring, insomnia, waking frequently to urinate, waking with heartburn or reflux, reduced concentration and memory, and morning headaches. Other health consequences may include development of high blood pressure and other cardiovascular disease in persons who are " susceptible. Untreated YVONNE  can contribute to heart disease, stroke and diabetes.   3. What are the treatment options? In most situations, sleep apnea is a lifelong disease that must be managed with daily therapy. Medications are not effective for sleep apnea and surgery is generally not performed until other therapies have been tried. Therapy is usually tailored to the individual patient based on many factors including your wishes as well as severity of sleep apnea and severity of obesity. Continuous Positive Airway (CPAP) is the most reliable treatment. An oral device to hold your jaw forward is usually the next most reliable option. Other options include postioning devices (to keep you off your back), weight loss, and surgery including a tongue pacing device. There is more detail about some of these options below.    Central sleep apnea is a disorder in which your breathing repeatedly stops and starts during sleep.  Central sleep apnea occurs because your brain doesn't send proper signals to the muscles that control your breathing. This condition is different from obstructive sleep apnea, in which you can't breathe normally because of upper airway obstruction. Central sleep apnea is less common than obstructive sleep apnea.  Central sleep apnea may occur as a result of other conditions, such as heart failure and stroke. Sleeping at a high altitude and pain medications also may cause central sleep apnea.        1. CPAP-  WHAT DOES IT DO AND HOW CAN I LEARN TO WEAR IT?                               BEFORE I START, CAN I WATCH A MOVIE TO GET A PLAN ON HOW TO USE CPAP?  https://www.[employer].com/watch?j=a0O82xx524O      Continuous positive airway pressure, or CPAP, is the most effective treatment for obstructive sleep apnea. It works by blowing room air, through a mask, to hold your throat open. A decision to use CPAP is a major step forward in the pursuit of a healthier life. The successful use of CPAP will help you  "breathe easier, sleep better and live healthier. You can choose CPAP equipment from any durable medical equipment provider that meets your needs.  Using CPAP can be a positive experience if you keep these catalan points in mind:  1. Commitment  CPAP is not a quick fix for your problem. It involves a long-term commitment to improve your sleep and your health.    2. Communication  Stay in close communication with both your sleep doctor and your CPAP supplier. Ask lots of questions and seek help when you need it.    3. Consistency  Use CPAP all night, every night and for every nap. You will receive the maximum health benefits from CPAP when you use it every time that you sleep. This will also make it easier for your body to adjust to the treatment.    4. Correction  The first machine and mask that you try may not be the best ones for you. Work with your sleep doctor and your CPAP supplier to make corrections to your equipment selection. Ask about trying a different type of machine or mask if you have ongoing problems. Make sure that your mask is a good fit and learn to use your equipment properly.    5. Challenge  Tell a family member or close friend to ask you each morning if you used your CPAP the previous night. Have someone to challenge you to give it your best effort.    6. Connection   Your adjustment to CPAP will be easier if you are able to connect with others who use the same treatment. Ask your sleep doctor if there is a support group in your area for people who have sleep apnea, or look for one on the Internet.  7. Comfort   Increase your level of comfort by using a saline spray, decongestant or heated humidifier if CPAP irritates your nose, mouth or throat. Use your unit's \"ramp\" setting to slowly get used to the air pressure level. There may be soft pads you can buy that will fit over your mask straps. Look on www.CPAP.com for accessories that can help make CPAP use more comfortable.  8. Cleaning   Clean your " mask, tubing and headgear on a regular basis. Put this time in your schedule so that you don't forget to do it. Check and replace the filters for your CPAP unit and humidifier.    9. Completion   Although you are never finished with CPAP therapy, you should reward yourself by celebrating the completion of your first month of treatment. Expect this first month to be your hardest period of adjustment. It will involve some trial and error as you find the machine, mask and pressure settings that are right for you.    10. Continuation  After your first month of treatment, continue to make a daily commitment to use your CPAP all night, every night and for every nap.    CPAP-Tips to starting with success:  Begin using your CPAP for short periods of time during the day while you watch TV or read.    Use CPAP every night and for every nap. Using it less often reduces the health benefits and makes it harder for your body to get used to it.    Make small adjustments to your mask, tubing, straps and headgear until you get the right fit. Tightening the mask may actually worsen the leak.  If it leaves significant marks on your face or irritates the bridge of your nose, it may not be the best mask for you.  Speak with the person who supplied the mask and consider trying other masks. Insurances will allow you to try different masks during the first month of starting CPAP.  Insurance also covers a new mask, hose and filter about every 6 months.    Use a saline nasal spray to ease mild nasal congestion. Neti-Pot or saline nasal rinses may also help. Nasal gel sprays can help reduce nasal dryness.  Biotene mouthwash can be helpful to protect your teeth if you experience frequent dry mouth.  Dry mouth may be a sign of air escaping out of your mouth or out of the mask in the case of a full face mask.  Speak with your provider if you expect that is the case.     Take a nasal decongestant to relieve more severe nasal or sinus congestion.   Do not use Afrin (oxymetazoline) nasal spray more than 3 days in a row.  Speak with your sleep doctor if your nasal congestion is chronic.    Use a heated humidifier that fits your CPAP model to enhance your breathing comfort. Adjust the heat setting up if you get a dry nose or throat, down if you get condensation in the hose or mask.  Position the CPAP lower than you so that any condensation in the hose drains back into the machine rather than towards the mask.    Try a system that uses nasal pillows if traditional masks give you problems.    Clean your mask, tubing and headgear once a week. Make sure the equipment dries fully.    Regularly check and replace the filters for your CPAP unit and humidifier.    Work closely with your sleep provider and your CPAP supplier to make sure that you have the machine, mask and air pressure setting that works best for you. It is better to stop using it and call your provider to solve problems than to lay awake all night frustrated with the device.    BESIDES CPAP, WHAT OTHER THERAPIES ARE THERE?      Positioning Device  Positioning devices are generally used when sleep apnea is mild and only occurs on your back.This example shows a pillow that straps around the waist. It may be appropriate for those whose sleep study shows milder sleep apnea that occurs primarily when lying flat on one's back. Preliminary studies have shown benefit but effectiveness at home may need to be verified by a home sleep test. These devices are generally not covered by medical insurance.                      Oral Appliance  What is oral appliance therapy?  An oral appliance is a small acrylic device that fits over the upper and lower teeth or tongue (similar to an orthodontic retainer or a mouth guard). This device slightly advances the lower jaw or tongue, which moves the base of the tongue forward, opens the airway, improves breathing and can effectively treat snoring and obstructive sleep apnea  sleep apnea. The appliance is fabricated and customized by a qualified dentist with experience in treating snoring and sleep apnea. Oral appliances are usually well tolerated and have relatively high compliance by patients1, 2, 3.  When is an oral appliance indicated?  Oral appliance therapy is recommended as a first-line treatment for patients with primary snoring, mild sleep apnea, and for patients with moderate sleep apnea who prefer appliance therapy to use of CPAP4, 5. Severity of sleep apnea is determined by sleep testing and is based on the number of respiratory events per hour of sleep.   How successful is oral appliance therapy?  The success rate of oral appliance therapy in patients with mild sleep apnea is 75-80% while in patients with moderate sleep apnea it is 50-70%. The chance of success in patients with severe sleep apnea is 40-50%. The research also shows that oral appliances have a beneficial effect on the cardiovascular health of YVONNE patients at the same magnitude as CPAP therapy7.  Oral appliances should be a second-line treatment in cases of severe sleep apnea, but if not completely successful then a combination therapy utilizing CPAP plus oral appliance therapy may be effective. Oral appliances tend to be effective in a broad range of patients although studies show that the patients who have the highest success are females, younger patients, those with milder disease, and less severe obesity. 3, 6.   The chances of success are lower in patients who have more severe YVONNE, are older, and those who are morbidly obese.     Example of an oral appliance   Finding a dentist that practices dental sleep medicine  Specific training is available through the American Academy of Dental Sleep Medicine for dentists interested in working in the field of sleep. To find a dentist who is educated in the field of sleep and the use of oral appliances, near you, visit the Web site of the American Academy of Dental  Sleep Medicine; also see   http://www.accpstorage.org/newOrganization/patients/oralAppliances.pdf  To search for a dentist certified in these practices:  Http://aadsm.org/FindADentist.aspx?1  1. Brandon et al. Objectively measured vs self-reported compliance during oral appliance therapy for sleep-disordered breathing. Chest 2013; 144(5): 1477-0904.  2. Janee, et al. Objective measurement of compliance during oral appliance therapy for sleep-disordered breathing. Thorax 2013; 68(1): 91-96.  3. Emilie et al. Mandibular advancement devices in 620 men and women with YVONNE and snoring: tolerability and predictors of treatment success. Chest 2004; 125: 3200-9248.  4. Bari et al. Oral appliances for snoring and YVONNE: a review. Sleep 2006; 29: 244-262.  5. Bean, et al. Oral appliance treatment for YVONNE: an update. J Clin Sleep Med 2014; 10(2): 215-227.  6. Becki et al. Predictors of OSAH treatment outcome. J Dent Res 2007; 86: 5598-0893.    Nasal Valves                 Nasal valves may not be effective if you have frequent nasal congestion or have difficulty breathing through your nose. They may be an option for mild apnea if other options are not well tolerated. The efficacy of these devices is generally less than CPAP or oral appliances.      Weight Loss:    Weight management is a personal decision.  If you are interested in exploring weight loss strategies, the following discussion covers the impact on weight loss on sleep apnea and the approaches that may be successful.    Weight loss decreases severity of sleep apnea in most people with obesity. For those with mild obesity who have developed snoring with weight gain, even 15-30 pound weight loss can improve and occasionally eliminate sleep apnea.  Structured and life-long dietary and health habits are necessary to lose weight and keep healthier weight levels.     Though there may be significant health benefits from weight loss, long-term weight  loss is very difficult to achieve- studies show success with dietary management in less than 10% of people. In addition, substantial weight loss may require years of dietary control and may be difficult if patients have severe obesity. In these cases, surgical management may be considered.  Finally, older individuals who have tolerated obesity without health complications may be less likely to benefit from weight loss strategies.    Your BMI is Body mass index is 29.83 kg/(m^2).  Body mass index (BMI) is one way to tell whether you are at a healthy weight, overweight, or obese. It measures your weight in relation to your height.  A BMI of 18.5 to 24.9 is in the healthy range. A person with a BMI of 25 to 29.9 is considered overweight, and someone with a BMI of 30 or greater is considered obese. More than two-thirds of American adults are considered overweight or obese.  Being overweight or obese increases the risk for further weight gain. Excess weight may lead to heart disease and diabetes.  Creating and following plans for healthy eating and physical activity may help you improve your health.  Weight control is part of healthy lifestyle and includes exercise, emotional health, and healthy eating habits. Careful eating habits lifelong are the mainstay of weight control. Though there are significant health benefits from weight loss, long-term weight loss with diet alone may be very difficult to achieve- studies show long-term success with dietary management in less than 10% of people. Attaining a healthy weight may be especially difficult to achieve in those with severe obesity. In some cases, medications, devices and surgical management might be considered.  What can you do?  If you are overweight or obese and are interested in methods for weight loss, you should discuss this with your provider.     Consider reducing daily calorie intake by 500 calories.     Keep a food journal.     Avoiding skipping meals,  consider cutting portions instead.    Diet combined with exercise helps maintain muscle while optimizing fat loss. Strength training is particularly important for building and maintaining muscle mass. Exercise helps reduce stress, increase energy, and improves fitness. Increasing exercise without diet control, however, may not burn enough calories to loose weight.       Start walking three days a week 10-20 minutes at a time    Work towards walking thirty minutes five days a week     Eventually, increase the speed of your walking for 1-2 minutes at time    In addition, we recommend that you review healthy lifestyles and methods for weight loss available through the National Institutes of Health patient information sites:  http://win.niddk.nih.gov/publications/index.htm    And look into health and wellness programs that may be available through your health insurance provider, employer, local community center, or bebe club.    Weight management plan: Patient was referred to their PCP to discuss a diet and exercise plan.    Surgery:    Upper Airway Surgery for YVONNE  Surgery for YVONNE is a second-line treatment option in the management of sleep apnea.  Surgery should be considered for patients who are having a difficult time tolerating CPAP.    Surgery for YVONNE is directed at areas that are responsible for narrowing or complete obstruction of the airway during sleep.  There are a wide range of procedures available to enlarge and/or stabilize the airway to prevent blockage of breathing in the three major areas where it can occur: the palate, tongue, and nasal regions.  Successful surgical treatment depends on the accurate identification of the factors responsible for obstructive sleep apnea in each person.  A personalized approach is required because there is no single treatment that works well for everyone.  Because of anatomic variation, consultation with an examination by a sleep surgeon is a critical first step in  determining what surgical options are best for each patient.  In some cases, examination during sedation may be recommended in order to guide the selection of procedures.  Patients will be counseled about risks and benefits as well as the typical recovery course after surgery. Surgery is typically not a cure for a person s YVONNE.  However, surgery will often significantly improve one s YVONNE severity (termed  success rate ).  Even in the absence of a cure, surgery will decrease the cardiovascular risk associated with OSA7; improve overall quality of life8 (sleepiness, functionality, sleep quality, etc).          Palate Procedures:  Patients with YVONNE often have narrowing of their airway in the region of their tonsils and uvula.  The goals of palate procedures are to widen the airway in this region as well as to help the tissues resist collapse.  Modern palate procedure techniques focus on tissue conservation and soft tissue rearrangement, rather than tissue removal.  Often the uvula is preserved in this procedure. Residual sleep apnea is common in patient after pharyngoplasty with an average reduction in sleep apnea events of 33%2.      Tongue Procedures:  While patients are awake, the muscles that surround the throat are active and keep this region open for breathing. These muscles relax during sleep, allowing the tongue and other structures to collapse and block breathing.  There are several different tongue procedures available.  Selection of a tongue base procedure depends on characteristics seen on physical exam.  Generally, procedures are aimed at removing bulky tissues in this area or preventing the back of the tongue from falling back during sleep.  Success rates for tongue surgery range from 50-62%3.    Hypoglossal Nerve Stimulation:  Hypoglossal nerve stimulation has recently received approval from the [place] Food and Drug Administration for the treatment of obstructive sleep apnea.  This is based on  research showing that the system was safe and effective in treating sleep apnea6.  Results showed that the median AHI score decreased 68%, from 29.3 to 9.0. This therapy uses an implant system that senses breathing patterns and delivers mild stimulation to airway muscles, which keeps the airway open during sleep.  The system consists of three fully implanted components: a small generator (similar in size to a pacemaker), a breathing sensor, and a stimulation lead.  Using a small handheld remote, a patient turns the therapy on before bed and off upon awakening.    Candidates for this device must be greater than 22 years of age, have moderate to severe YVONNE (AHI between 20-65), BMI less than 32, have tried CPAP/oral appliance without success, and have appropriate upper airway anatomy (determined by a sleep endoscopy performed by Dr. Martin).    Hypoglossal Nerve Stimulation Pathway:    The sleep surgeon s office will work with the patient through the insurance prior-authorization process (including communications and appeals).    Nasal Procedures:  Nasal obstruction can interfere with nasal breathing during the day and night.  Studies have shown that relief of nasal obstruction can improve the ability of some patients to tolerate positive airway pressure therapy for obstructive sleep apnea1.  Treatment options include medications such as nasal saline, topical corticosteroid and antihistamine sprays, and oral medications such as antihistamines or decongestants. Non-surgical treatments can include external nasal dilators for selected patients. If these are not successful by themselves, surgery can improve the nasal airway either alone or in combination with these other options.      Combination Procedures:  Combination of surgical procedures and other treatments may be recommended, particularly if patients have more than one area of narrowing or persistent positional disease.  The success rate of combination surgery ranges  from 66-80%2,3.      1. Logan PEREZ. The Role of the Nose in Snoring and Obstructive Sleep Apnoea: An Update.  Eur Arch Otorhinolaryngol. 2011; 268: 1365-73.  2.  Niyah SM; Mark JA; Emma JR; Pallanch JF; Jerica MB; Verenice SG; Kevin PENA. Surgical modifications of the upper airway for obstructive sleep apnea in adults: a systematic review and meta-analysis. SLEEP 2010;33(10):1317-9312. Marilia DAVE. Hypopharyngeal surgery in obstructive sleep apnea: an evidence-based medicine review.  Arch Otolaryngol Head Neck Surg. 2006 Feb;132(2):206-13.  3. Zana YH1, Aldair Y, Carroll JANET. The efficacy of anatomically based multilevel surgery for obstructive sleep apnea. Otolaryngol Head Neck Surg. 2003 Oct;129(4):327-35.  4. Marilia DAVE, Goldberg A. Hypopharyngeal Surgery in Obstructive Sleep Apnea: An Evidence-Based Medicine Review. Arch Otolaryngol Head Neck Surg. 2006 Feb;132(2):206-13.  5. Ananda PJ et al. Upper-Airway Stimulation for Obstructive Sleep Apnea.  N Engl J Med. 2014 Jan 9;370(2):139-49.  6. Willis Y et al. Increased Incidence of Cardiovascular Disease in Middle-aged Men with Obstructive Sleep Apnea. Am J Respir Crit Care Med; 2002 166: 159-165  7. Foster EM et al. Studying Life Effects and Effectiveness of Palatopharyngoplasty (SLEEP) study: Subjective Outcomes of Isolated Uvulopalatopharyngoplasty. Otolaryngol Head Neck Surg. 2011; 144: 623-631.  8.   Your blood pressure was checked while you were in clinic today.  Please read the guidelines below about what these numbers mean and what you should do about them.  Your systolic blood pressure is the top number.  This is the pressure when the heart is pumping.  Your diastolic blood pressure is the bottom number.  This is the pressure in between beats.  If your systolic blood pressure is less than 120 and your diastolic blood pressure is less than 80, then your blood pressure is normal. There is nothing more that you need to do about it  If your systolic blood  pressure is 120-139 or your diastolic blood pressure is 80-89, your blood pressure may be higher than it should be.  You should have your blood pressure re-checked within a year by a primary care provider.  If your systolic blood pressure is 140 or greater or your diastolic blood pressure is 90 or greater, you may have high blood pressure.  High blood pressure is treatable, but if left untreated over time it can put you at risk for heart attack, stroke, or kidney failure.  You should have your blood pressure re-checked by a primary care provider within the next four weeks.    Good Sleep hygiene tips (American Academy of Sleep Medicine):  Maintain a regular sleep-wake routine    Go to bed at the same time. Wake up at the same time. Ideally, your schedule will remain the same (+/- 20 minutes) every night of the week.  Avoid naps if possible    Naps decrease the  Sleep Debt  that is so necessary for easy sleep onset.    Each of us needs a certain amount of sleep per 24-hour period. We need that amount, and we don t need more than that.    When we take naps, it decreases the amount of sleep that we need the next night - which may cause sleep fragmentation and difficulty initiating sleep, and may lead to insomnia.  Don t stay in bed awake for more than 15-20 minutes (Stimulus control)    If you find your mind racing, or worrying about not being able to sleep during the middle of the night, get out of bed, and sit in a chair in the dark. Do your mind racing in the chair until you are sleepy, then return to bed. No TV, or phone/Ipad, or computer or internet or eat during these periods! That will just stimulate you more than desired.    If this happens several times during the night, that is OK. Just maintain your regular wake time, and try to avoid naps.  Don t watch TV, phone, computer, video games or read in bed or eat in bed.    When you watch TV or read in bed or eat in bed, you associate the bed with  wakefulness.    The bed is reserved for two things - sleep and hanky panky.  Drink caffeinated drinks with caution    The effects of caffeine may last for several hours after ingestion. Caffeine can fragment sleep, and cause difficulty initiating sleep. If you drink caffeine, use it only before noon.    Remember that soda and tea contain caffeine as well.  Avoid inappropriate substances that interfere with sleep    Cigarettes, alcohol, and over-the-counter medications may cause fragmented sleep.  Exercise regularly    Exercise promotes continuous sleep.    Rigorous exercise (close to bedtime cause) circulates endorphins into the body which may cause difficulty initiating sleep.   Have a quiet, comfortable bedroom    Set your bedroom thermostat at a comfortable temperature. Generally, a little cooler is better than a little warmer.    Turn off the TV and other extraneous noise that may disrupt sleep. Background  white noise  like a fan is OK.    If your pets awaken you, keep them outside the bedroom.    Your bedroom should be dark. Turn off bright lights.    Have a comfortable mattress.  If you are a  clock watcher  at night, hide the clock.      Have a comfortable pre-bedtime routine    A warm bath, shower    Meditation, or quiet time    Wind-down 20 minutes prior of bedtime.      Your BMI is Body mass index is 29.83 kg/(m^2).  Weight management is a personal decision.  If you are interested in exploring weight loss strategies, the following discussion covers the approaches that may be successful. Body mass index (BMI) is one way to tell whether you are at a healthy weight, overweight, or obese. It measures your weight in relation to your height.  A BMI of 18.5 to 24.9 is in the healthy range. A person with a BMI of 25 to 29.9 is considered overweight, and someone with a BMI of 30 or greater is considered obese. More than two-thirds of American adults are considered overweight or obese.  Being overweight or obese  increases the risk for further weight gain. Excess weight may lead to heart disease and diabetes.  Creating and following plans for healthy eating and physical activity may help you improve your health.  Weight control is part of healthy lifestyle and includes exercise, emotional health, and healthy eating habits. Careful eating habits lifelong are the mainstay of weight control. Though there are significant health benefits from weight loss, long-term weight loss with diet alone may be very difficult to achieve- studies show long-term success with dietary management in less than 10% of people. Attaining a healthy weight may be especially difficult to achieve in those with severe obesity. In some cases, medications, devices and surgical management might be considered.  What can you do?  If you are overweight or obese and are interested in methods for weight loss, you should discuss this with your provider.     Consider reducing daily calorie intake by 500 calories.     Keep a food journal.     Avoiding skipping meals, consider cutting portions instead.    Diet combined with exercise helps maintain muscle while optimizing fat loss. Strength training is particularly important for building and maintaining muscle mass. Exercise helps reduce stress, increase energy, and improves fitness. Increasing exercise without diet control, however, may not burn enough calories to loose weight.       Start walking three days a week 10-20 minutes at a time    Work towards walking thirty minutes five days a week     Eventually, increase the speed of your walking for 1-2 minutes at time    In addition, we recommend that you review healthy lifestyles and methods for weight loss available through the National Institutes of Health patient information sites:  http://win.niddk.nih.gov/publications/index.htm    And look into health and wellness programs that may be available through your health insurance provider, employer, local community  center, or bebe club.    Weight management plan: Patient was referred to their PCP to discuss a diet and exercise plan.   Your blood pressure was checked while you were in clinic today.  Please read the guidelines below about what these numbers mean and what you should do about them.  Your systolic blood pressure is the top number.  This is the pressure when the heart is pumping.  Your diastolic blood pressure is the bottom number.  This is the pressure in between beats.  If your systolic blood pressure is less than 120 and your diastolic blood pressure is less than 80, then your blood pressure is normal. There is nothing more that you need to do about it  If your systolic blood pressure is 120-139 or your diastolic blood pressure is 80-89, your blood pressure may be higher than it should be.  You should have your blood pressure re-checked within a year by a primary care provider.  If your systolic blood pressure is 140 or greater or your diastolic blood pressure is 90 or greater, you may have high blood pressure.  High blood pressure is treatable, but if left untreated over time it can put you at risk for heart attack, stroke, or kidney failure.  You should have your blood pressure re-checked by a primary care provider within the next four weeks.              Follow-ups after your visit        Future tests that were ordered for you today     Open Future Orders        Priority Expected Expires Ordered    Comprehensive Sleep Study Routine  1/22/2019 7/26/2018            Who to contact     If you have questions or need follow up information about today's clinic visit or your schedule please contact [facility] directly at [phone].  Normal or non-critical lab and imaging results will be communicated to you by MyChart, letter or phone within 4 business days after the clinic has received the results. If you do not hear from us within 7 days, please contact the clinic through [employer] or  "phone. If you have a critical or abnormal lab result, we will notify you by phone as soon as possible.  Submit refill requests through [employer] or call your pharmacy and they will forward the refill request to us. Please allow 3 business days for your refill to be completed.          Additional Information About Your Visit        [facility] Information     [employer] gives you secure access to your electronic health record. If you see a primary care provider, you can also send messages to your care team and make appointments. If you have questions, please call your primary care clinic.  If you do not have a primary care provider, please call [phone] and they will assist you.        Care EveryWhere ID     This is your Care EveryWhere ID. This could be used by other organizations to access your [facility] medical records  [id number]        Your Vitals Were     Pulse Respirations Height Pulse Oximetry BMI (Body Mass Index)       113 14 1.753 m (5' 9\") 96% 29.83 kg/m2        Blood Pressure from Last 3 Encounters:   07/26/18 127/88   07/26/18 120/80   06/28/18 111/80    Weight from Last 3 Encounters:   07/26/18 91.6 kg (202 lb)   07/26/18 91.2 kg (201 lb)   06/28/18 90.4 kg (199 lb 6.4 oz)              We Performed the Following     SLEEP EVALUATION & MANAGEMENT REFERRAL - ADULT -[facility]  [phone] (Age 18 and up)        Primary Care Provider Office Phone # Fax #    Ed Flores [phone]       [facility] [address]        Equal Access to Services     MARIEL JOHN : Hadii aad ku hadasho Soomaali, waaxda luqadaha, qaybta kaalmada adeegyadave, roger edouard. So [facility] [phone].    ATENCIÓN: Si habla español, tiene a berger disposición servicios gratuitos de asistencia lingüística. Llame al [phone].    We comply with applicable federal civil rights laws and [place] laws. We do not discriminate on the basis of race, color, " national origin, age, disability, sex, sexual orientation, or gender identity.            Thank you!     Thank you for choosing [facility]  for your care. Our goal is always to provide you with excellent care. Hearing back from our patients is one way we can continue to improve our services. Please take a few minutes to complete the written survey that you may receive in the mail after your visit with us. Thank you!             Your Updated Medication List - Protect others around you: Learn how to safely use, store and throw away your medicines at www.disposemymeds.org.          This list is accurate as of 7/26/18  5:45 PM.  Always use your most recent med list.                   Brand Name Dispense Instructions for use Diagnosis    cyclobenzaprine 10 MG tablet    FLEXERIL    10 tablet    Take 0.5-1 tablets (5-10 mg) by mouth 3 times daily as needed for muscle spasms    Strain of lumbar region, initial encounter       lisinopril 2.5 MG tablet    PRINIVIL/Zestril    30 tablet    Take 1 tablet (2.5 mg) by mouth daily    Secondary cardiomyopathy (H)       metoprolol succinate 25 MG 24 hr tablet    TOPROL-XL    90 tablet    Take 1 tablet (25 mg) by mouth daily    Nonischemic cardiomyopathy (H)       rosuvastatin 20 MG tablet    CRESTOR    90 tablet    Take 1 tablet (20 mg) by mouth daily    Hyperlipidemia LDL goal <130       sertraline 100 MG tablet    ZOLOFT    90 tablet    Take 1 tablet (100 mg) by mouth daily    Major depressive disorder, recurrent episode, mild (H)       VIAGRA 50 MG tablet   Generic drug:  sildenafil      TK SS TO ONE T PO PRN - ONE HOUR BEFORE SEXUAL ACTIVITY        zolpidem 10 MG tablet    AMBIEN    10 tablet    Take 1 tablet (10 mg) by mouth nightly as needed for sleep    Other insomnia

## 2018-08-02 DIAGNOSIS — G47.09 OTHER INSOMNIA: ICD-10-CM

## 2018-08-02 NOTE — TELEPHONE ENCOUNTER
zolpidem (AMBIEN) 10 MG tablet      Last Written Prescription Date:  6/28/2018  Last Fill Quantity: 10 tablet,   # refills: 0  Last Office Visit: 7/26/2018  Beulah  Future Office visit:       Routing refill request to provider for review/approval because:  Drug not on the [facility], [facility] or [facility] refill protocol or controlled substance

## 2018-08-06 RX ORDER — ZOLPIDEM TARTRATE 10 MG/1
10 TABLET ORAL
Qty: 10 TABLET | Refills: 0 | Status: SHIPPED | OUTPATIENT
Start: 2018-08-06 | End: 2018-09-24

## 2018-09-24 DIAGNOSIS — G47.09 OTHER INSOMNIA: ICD-10-CM

## 2018-09-24 NOTE — TELEPHONE ENCOUNTER
Ambien      Last Written Prescription Date:  8/6/18  Last Fill Quantity: 10,   # refills: 0  Last Office Visit: 7/26/18  Future Office visit:       Routing refill request to provider for review/approval because:  Drug not on the [facility], [facility] or [facility] refill protocol or controlled substance  Maddie Lira RN, BSN  [facility]

## 2018-09-25 RX ORDER — ZOLPIDEM TARTRATE 10 MG/1
10 TABLET ORAL
Qty: 10 TABLET | Refills: 0 | Status: SHIPPED | OUTPATIENT
Start: 2018-09-25 | End: 2019-06-24

## 2018-10-23 DIAGNOSIS — G47.09 OTHER INSOMNIA: ICD-10-CM

## 2018-10-23 NOTE — TELEPHONE ENCOUNTER
zolpidem (AMBIEN) 10 MG tablet      Last Written Prescription Date:  9/25/2018  Last Fill Quantity: 10 tablet,   # refills: 0  Last Office Visit: 7/26/2018  Beulah  Future Office visit:       Routing refill request to provider for review/approval because:  Drug not on the [facility], [facility] or [facility] refill protocol or controlled substance

## 2018-10-24 RX ORDER — ZOLPIDEM TARTRATE 10 MG/1
10 TABLET ORAL
Qty: 10 TABLET | Refills: 0 | OUTPATIENT
Start: 2018-10-24

## 2018-10-24 NOTE — TELEPHONE ENCOUNTER
Declined.  Needs to complete his sleep study which was ordered back in July but never completed.      Will send Agapito a Anki message.    Justen Santoyo MD

## 2018-12-10 DIAGNOSIS — G47.09 OTHER INSOMNIA: ICD-10-CM

## 2018-12-10 RX ORDER — ZOLPIDEM TARTRATE 10 MG/1
10 TABLET ORAL
Qty: 10 TABLET | Refills: 0 | Status: CANCELLED | OUTPATIENT
Start: 2018-12-10

## 2018-12-10 NOTE — TELEPHONE ENCOUNTER
zolpidem (AMBIEN) 10 MG tablet      Last Written Prescription Date:  9/25/2018  Last Fill Quantity: 10 tablet,   # refills: 0  Last Office Visit: 7/26/2018  Beulah  Future Office visit:       Routing refill request to provider for review/approval because:  Drug not on the [facility], [facility] or [facility] refill protocol or controlled substance

## 2018-12-12 NOTE — TELEPHONE ENCOUNTER
Refill declined. It was recommended for Agapito to undergo sleep study but it has not bee completed.    Mark Riojas DO  12/12/2018 9:42 AM

## 2018-12-14 NOTE — TELEPHONE ENCOUNTER
Called pt at [phone] and left non detailed message to call back.  He had sleep consult but study was cancelled and has not been rescheduled.  Did he do this outside of ?  Transfer to  when pt calls back.  Danielle Palumbo

## 2018-12-14 NOTE — TELEPHONE ENCOUNTER
Pt called back and states he is not going to pay for the sleep study as he knows they will tell him he needs a CPAP and he does not want to pay for a CPAP.  He said he is fine with MD not refilling his rx.  Danielle Palumbo

## 2018-12-26 ENCOUNTER — MYC MEDICAL ADVICE (OUTPATIENT)
Dept: FAMILY MEDICINE | Facility: CLINIC | Age: 33
End: 2018-12-26

## 2018-12-26 ENCOUNTER — E-VISIT (OUTPATIENT)
Dept: FAMILY MEDICINE | Facility: CLINIC | Age: 33
End: 2018-12-26
Payer: COMMERCIAL

## 2018-12-26 DIAGNOSIS — F33.0 MAJOR DEPRESSIVE DISORDER, RECURRENT EPISODE, MILD (H): Primary | ICD-10-CM

## 2018-12-26 PROCEDURE — 99444 ZZC PHYSICIAN ONLINE EVALUATION & MANAGEMENT SERVICE: CPT | Performed by: FAMILY MEDICINE

## 2018-12-26 ASSESSMENT — ANXIETY QUESTIONNAIRES
7. FEELING AFRAID AS IF SOMETHING AWFUL MIGHT HAPPEN: NOT AT ALL
6. BECOMING EASILY ANNOYED OR IRRITABLE: MORE THAN HALF THE DAYS
GAD7 TOTAL SCORE: 10
4. TROUBLE RELAXING: MORE THAN HALF THE DAYS
7. FEELING AFRAID AS IF SOMETHING AWFUL MIGHT HAPPEN: NOT AT ALL
2. NOT BEING ABLE TO STOP OR CONTROL WORRYING: SEVERAL DAYS
3. WORRYING TOO MUCH ABOUT DIFFERENT THINGS: SEVERAL DAYS
GAD7 TOTAL SCORE: 10
5. BEING SO RESTLESS THAT IT IS HARD TO SIT STILL: MORE THAN HALF THE DAYS
1. FEELING NERVOUS, ANXIOUS, OR ON EDGE: MORE THAN HALF THE DAYS

## 2018-12-26 NOTE — TELEPHONE ENCOUNTER
Message sent to patient to initiate an eVisit.  Maddie Lira, RN, BSN  [facility]

## 2018-12-27 RX ORDER — SERTRALINE HYDROCHLORIDE 100 MG/1
150 TABLET, FILM COATED ORAL DAILY
Qty: 135 TABLET | Refills: 0 | Status: SHIPPED | OUTPATIENT
Start: 2018-12-27 | End: 2019-06-26

## 2018-12-27 ASSESSMENT — ANXIETY QUESTIONNAIRES: GAD7 TOTAL SCORE: 10

## 2019-03-15 DIAGNOSIS — E78.5 HYPERLIPIDEMIA LDL GOAL <130: ICD-10-CM

## 2019-03-18 RX ORDER — ROSUVASTATIN CALCIUM 20 MG/1
TABLET, COATED ORAL
Qty: 90 TABLET | Refills: 1 | Status: SHIPPED | OUTPATIENT
Start: 2019-03-18 | End: 2020-03-26

## 2019-03-18 NOTE — TELEPHONE ENCOUNTER
Prescription approved per [facility] Refill Protocol.  JOSE DANIEL GarciaN, RN  [facility]

## 2019-03-18 NOTE — TELEPHONE ENCOUNTER
"Requested Prescriptions   Pending Prescriptions Disp Refills     rosuvastatin (CRESTOR) 20 MG tablet [Pharmacy Med Name: ROSUVASTATIN CALCIUM 20MG TABS]  Last Written Prescription Date:  7/11/2018  Last Fill Quantity: 90 tablet,  # refills: 1   Last office visit: 7/26/2018 with prescribing provider:  Beulah     Future Office Visit:       90 tablet 1     Sig: TAKE ONE TABLET BY MOUTH EVERY DAY    [facility] Protocol Passed - 3/15/2019  5:34 PM       Passed - LDL on file in past 12 months    Recent Labs   Lab Test 06/28/18  0948   LDL Cannot estimate LDL when triglyceride exceeds 400 mg/dL  165*            Passed - No abnormal creatine kinase in past 12 months    No lab results found.            Passed - Recent (12 mo) or future (30 days) visit within the authorizing provider's specialty    Patient had office visit in the last 12 months or has a visit in the next 30 days with authorizing provider or within the authorizing provider's specialty.  See \"Patient Info\" tab in inbasket, or \"Choose Columns\" in Meds & Orders section of the refill encounter.             Passed - Medication is active on med list       Passed - Patient is age 18 or older        "

## 2019-04-22 ENCOUNTER — MYC MEDICAL ADVICE (OUTPATIENT)
Dept: FAMILY MEDICINE | Facility: CLINIC | Age: 34
End: 2019-04-22

## 2019-04-25 ENCOUNTER — TELEPHONE (OUTPATIENT)
Dept: CARDIOLOGY | Facility: CLINIC | Age: 34
End: 2019-04-25

## 2019-04-25 ENCOUNTER — HOSPITAL ENCOUNTER (OUTPATIENT)
Dept: CARDIOLOGY | Facility: CLINIC | Age: 34
Discharge: HOME | End: 2019-04-25
Attending: INTERNAL MEDICINE | Admitting: INTERNAL MEDICINE
Payer: COMMERCIAL

## 2019-04-25 DIAGNOSIS — I42.8 NONISCHEMIC CARDIOMYOPATHY (H): ICD-10-CM

## 2019-04-25 PROCEDURE — 93306 TTE W/DOPPLER COMPLETE: CPT | Mod: 26 | Performed by: INTERNAL MEDICINE

## 2019-04-25 PROCEDURE — 25500064 ZZH RX 255 OP 636: Performed by: INTERNAL MEDICINE

## 2019-04-25 PROCEDURE — 40000264 ECHOCARDIOGRAM COMPLETE

## 2019-04-25 RX ADMIN — HUMAN ALBUMIN MICROSPHERES AND PERFLUTREN 3 ML: 10; .22 INJECTION, SOLUTION INTRAVENOUS at 08:00

## 2019-04-25 NOTE — TELEPHONE ENCOUNTER
Echocardiogram results noted, please see below. Pt was last seen in clinic on 12/19/17, no follow up is currently scheduled. Per MyChart encounter on 11/27/19, pt was to schedule an echo and office visit with Dr. Hernandez. Called pt, communicated results. Advised pt he is overdue for follow up and offered to transfer pt to scheduling to make appointment. Pt verbalized understanding and agreement with plan.     Interpretation Summary     The left ventricle is normal in size.  LVEF 42% based on biplane 2D tracing.  No regional wall motion abnormalities noted.  No significant valvular heart disease.  Compared to echo 11/16/2017, the findings are similar

## 2019-06-06 ENCOUNTER — OFFICE VISIT (OUTPATIENT)
Dept: CARDIOLOGY | Facility: CLINIC | Age: 34
End: 2019-06-06
Payer: COMMERCIAL

## 2019-06-06 VITALS
WEIGHT: 199.3 LBS | BODY MASS INDEX: 29.52 KG/M2 | SYSTOLIC BLOOD PRESSURE: 126 MMHG | DIASTOLIC BLOOD PRESSURE: 84 MMHG | HEART RATE: 84 BPM | HEIGHT: 69 IN

## 2019-06-06 DIAGNOSIS — I42.9 SECONDARY CARDIOMYOPATHY (H): ICD-10-CM

## 2019-06-06 DIAGNOSIS — E78.01 FAMILIAL HYPERCHOLESTEREMIA: ICD-10-CM

## 2019-06-06 DIAGNOSIS — I42.8 NONISCHEMIC CARDIOMYOPATHY (H): Primary | ICD-10-CM

## 2019-06-06 PROCEDURE — 99214 OFFICE O/P EST MOD 30 MIN: CPT | Performed by: INTERNAL MEDICINE

## 2019-06-06 RX ORDER — METOPROLOL SUCCINATE 25 MG/1
25 TABLET, EXTENDED RELEASE ORAL DAILY
Qty: 90 TABLET | Refills: 3 | Status: SHIPPED | OUTPATIENT
Start: 2019-06-06 | End: 2020-07-01

## 2019-06-06 RX ORDER — LISINOPRIL 2.5 MG/1
2.5 TABLET ORAL DAILY
Qty: 30 TABLET | Refills: 11 | Status: SHIPPED | OUTPATIENT
Start: 2019-06-06 | End: 2019-11-26 | Stop reason: SINTOL

## 2019-06-06 ASSESSMENT — MIFFLIN-ST. JEOR: SCORE: 1839.4

## 2019-06-06 NOTE — LETTER
6/6/2019    Ed Flores MD  [address] [facility] [address]    RE: Agapito Yu       Dear Colleague,    I had the pleasure of seeing Agapito Yu in the [facility].    HPI and Plan:   See dictation    No orders of the defined types were placed in this encounter.      Orders Placed This Encounter   Medications     Multiple Vitamins-Minerals (MULTIVITAMIN ADULT PO)     lisinopril (PRINIVIL/ZESTRIL) 2.5 MG tablet     Sig: Take 1 tablet (2.5 mg) by mouth daily     Dispense:  30 tablet     Refill:  11     metoprolol succinate ER (TOPROL-XL) 25 MG 24 hr tablet     Sig: Take 1 tablet (25 mg) by mouth daily     Dispense:  90 tablet     Refill:  3       Medications Discontinued During This Encounter   Medication Reason     lisinopril (PRINIVIL/ZESTRIL) 2.5 MG tablet Medication Reconciliation Clean Up     metoprolol succinate (TOPROL-XL) 25 MG 24 hr tablet Reorder         Encounter Diagnoses   Name Primary?     Nonischemic cardiomyopathy (H) Yes     Secondary cardiomyopathy (H)      Familial hypercholesteremia        CURRENT MEDICATIONS:  Current Outpatient Medications   Medication Sig Dispense Refill     lisinopril (PRINIVIL/ZESTRIL) 2.5 MG tablet Take 1 tablet (2.5 mg) by mouth daily 30 tablet 11     metoprolol succinate ER (TOPROL-XL) 25 MG 24 hr tablet Take 1 tablet (25 mg) by mouth daily 90 tablet 3     Multiple Vitamins-Minerals (MULTIVITAMIN ADULT PO)        rosuvastatin (CRESTOR) 20 MG tablet TAKE ONE TABLET BY MOUTH EVERY DAY 90 tablet 1     VIAGRA 50 MG cap/tab TK SS TO ONE T PO PRN - ONE HOUR BEFORE SEXUAL ACTIVITY  0     cyclobenzaprine (FLEXERIL) 10 MG tablet Take 0.5-1 tablets (5-10 mg) by mouth 3 times daily as needed for muscle spasms (Patient not taking: Reported on 6/6/2019) 10 tablet 1     sertraline (ZOLOFT) 100 MG tablet Take 1.5 tablets (150 mg) by mouth daily If tolerating well after 2 weeks, can increase to 2 tablets (200 mg) by mouth daily. 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12 - chang's - Dr Hernandez - EF = 45%     Nonischemic cardiomyopathy (H)        PAST SURGICAL HISTORY:  Past Surgical History:   Procedure Laterality Date     DENTAL SURGERY  2001    wisdom teeth     SURGICAL HISTORY OF -   1997    Rt Hip/Ischium/Testicle removal - Chang's       FAMILY HISTORY:  Family History   Problem Relation Age of Onset     Hyperlipidemia Father      Hyperlipidemia Brother      Colon Cancer Paternal Grandmother        SOCIAL HISTORY:  Social History     Socioeconomic History     Marital status:      Spouse name: Toshia     Number of children: 0     Years of education: None     Highest education level: None   Occupational History     None   Social Needs     Financial resource strain: None     Food insecurity:     Worry: None     Inability: None     Transportation needs:     Medical: None     Non-medical: None   Tobacco Use     Smoking status: Never Smoker     Smokeless tobacco: Never Used   Substance and Sexual Activity     Alcohol use: Yes     Alcohol/week: 7.2 - 10.8 oz     Comment: 12-18 drinks per week      Drug use: Yes     Types: Marijuana     Comment: marafroy      Sexual activity: Yes     Partners: Female     Birth control/protection: None   Lifestyle     Physical activity:     Days per week: None     Minutes per session: None     Stress: None   Relationships     Social connections:     Talks on phone: None     Gets together: None     Attends [employer]  "service: None     Active member of club or organization: None     Attends meetings of clubs or organizations: None     Relationship status: None     Intimate partner violence:     Fear of current or ex partner: None     Emotionally abused: None     Physically abused: None     Forced sexual activity: None   Other Topics Concern     Parent/sibling w/ CABG, MI or angioplasty before 65F 55M? No   Social History Narrative     None       Review of Systems:  Skin:  Negative       Eyes:  Negative      ENT:  Negative      Respiratory:  Negative       Cardiovascular:  Negative      Gastroenterology: Negative      Genitourinary:  Negative      Musculoskeletal:  Negative      Neurologic:  Negative      Psychiatric:  Negative      Heme/Lymph/Imm:  Positive for allergies seasonal  Endocrine:  Negative        Physical Exam:  Vitals: /84 (BP Location: Right arm, Patient Position: Chair, Cuff Size: Adult Regular)   Pulse 84   Ht 1.753 m (5' 9\")   Wt 90.4 kg (199 lb 4.8 oz)   BMI 29.43 kg/m       Constitutional:  cooperative, alert and oriented, well developed, well nourished, in no acute distress        Skin:  warm and dry to the touch          Head:  normocephalic, no masses or lesions        Eyes:  pupils equal and round        Lymph:      ENT:           Neck:  carotid pulses are full and equal bilaterally        Respiratory:  normal symmetry         Cardiac: regular rhythm                                                         GI:  abdomen soft;no bruits        Extremities and Muscular Skeletal:  no edema              Neurological:  no gross motor deficits;affect appropriate        Psych:  Alert and Oriented x 3          CC  Ed Flores MD  [address] [place], [place] [id number]                    Thank you for allowing me to participate in the care of your patient.      Sincerely,     Maia Hernandez,      [facility]    cc:   Ed Flores MD  [address] [place] [address]" [place] [address]

## 2019-06-06 NOTE — PROGRESS NOTES
Service Date: 06/06/2019      REFERRING PHYSICIAN:  Dr. Ed Flores.      HISTORY OF PRESENT ILLNESS:  Mr. 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  He is here for a followup visit.  I saw him back in 12/2017 and had placed him on low-dose lisinopril and started him on carvedilol.  He preferred a once daily pill so I switched this to metoprolol.  I have not seen him back.  He has been doing well.  [facility] and was recommended to undergo sleep study, but he canceled the sleep study as he is unwilling to wear a CPAP mask.  It sounds like he has got a strong family history of sleep apnea.  He has otherwise been doing well.  He does not complain of any chest pains or shortness of breath.  He is running on occasion and fairly active.      PHYSICAL EXAMINATION:     VITAL SIGNS:  Today, his blood pressure is 126/84, pulse of 84, weight 199 with a body mass index of 29.   NECK:  Carotid upstrokes are brisk without bruit.   CARDIOVASCULAR:  Tones are regular without murmur, gallop or rub.   LUNGS:  Clear posteriorly.   EXTREMITIES:  He has no peripheral edema.      He has a severe mixed hyperlipidemia.  It is noted in June of last year.  LDL could not be estimated.  His triglycerides were 540, HDL 45, a total cholesterol was 291.      SUMMARY:  Mr. 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  He had a recent echocardiogram that suggests stable but continued mild to moderate LV dysfunction with an EF estimated around 42% with global hypokinesis.  There are no valve abnormalities.  We discussed the results of his echocardiogram today.  He is asymptomatic and compensated on exam.  However, untreated sleep apnea may be contributing to his cardiomyopathy and it certainly  could get worse if he has severe untreated sleep apnea.  I would encourage him to follow up and perform the sleep study.  I am not sure what happened with his medications.  He somehow lost the prescription for lisinopril.  I would like him on both low-dose metoprolol and lisinopril if possible if his blood pressure will tolerate.  He is willing to take this medicine so I will give him another prescription for 2.5 mg of lisinopril and I cautioned him about the potential side effects including hypotension.  He will contact me if he experiences any lightheadedness or dizziness.  He sees his primary on a regular basis for his cholesterol management and therefore, I will recommend to see him back in 2 years or as needed.  Please feel free to contact me with any questions you have in regards to his care.      cc:   [place] [address]         RONEL FREIRE DO             D: 2019   T: 2019   MT: DALTON      Name:     ANETTE SPANN   MRN:      [id number]        Account:      [id number]   :      1985           Service Date: 2019      Document: [id number]

## 2019-06-06 NOTE — PROGRESS NOTES
HPI and Plan:   See dictation    No orders of the defined types were placed in this encounter.      Orders Placed This Encounter   Medications     Multiple Vitamins-Minerals (MULTIVITAMIN ADULT PO)     lisinopril (PRINIVIL/ZESTRIL) 2.5 MG tablet     Sig: Take 1 tablet (2.5 mg) by mouth daily     Dispense:  30 tablet     Refill:  11     metoprolol succinate ER (TOPROL-XL) 25 MG 24 hr tablet     Sig: Take 1 tablet (25 mg) by mouth daily     Dispense:  90 tablet     Refill:  3       Medications Discontinued During This Encounter   Medication Reason     lisinopril (PRINIVIL/ZESTRIL) 2.5 MG tablet Medication Reconciliation Clean Up     metoprolol succinate (TOPROL-XL) 25 MG 24 hr tablet Reorder         Encounter Diagnoses   Name Primary?     Nonischemic cardiomyopathy (H) Yes     Secondary cardiomyopathy (H)      Familial hypercholesteremia        CURRENT MEDICATIONS:  Current Outpatient Medications   Medication Sig Dispense Refill     lisinopril (PRINIVIL/ZESTRIL) 2.5 MG tablet Take 1 tablet (2.5 mg) by mouth daily 30 tablet 11     metoprolol succinate ER (TOPROL-XL) 25 MG 24 hr tablet Take 1 tablet (25 mg) by mouth daily 90 tablet 3     Multiple Vitamins-Minerals (MULTIVITAMIN ADULT PO)        rosuvastatin (CRESTOR) 20 MG tablet TAKE ONE TABLET BY MOUTH EVERY DAY 90 tablet 1     VIAGRA 50 MG cap/tab TK SS TO ONE T PO PRN - ONE HOUR BEFORE SEXUAL ACTIVITY  0     cyclobenzaprine (FLEXERIL) 10 MG tablet Take 0.5-1 tablets (5-10 mg) by mouth 3 times daily as needed for muscle spasms (Patient not taking: Reported on 6/6/2019) 10 tablet 1     sertraline (ZOLOFT) 100 MG tablet Take 1.5 tablets (150 mg) by mouth daily If tolerating well after 2 weeks, can increase to 2 tablets (200 mg) by mouth daily. (Patient not taking: Reported on 6/6/2019) 135 tablet 0     zolpidem (AMBIEN) 10 MG tablet Take 1 tablet (10 mg) by mouth nightly as needed for sleep (Patient not taking: Reported on 6/6/2019) 10 tablet 0       ALLERGIES   No  Known Allergies    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     Hyperlipidemia LDL goal <130      Major depressive disorder, recurrent episode, mild (H) 12/8/2016     Mild depression (H)      Nonischemic cardiomyopathy (H)     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 at age 12 - chang's - Dr Hernandez - EF = 45%     Nonischemic cardiomyopathy (H)        PAST SURGICAL HISTORY:  Past Surgical History:   Procedure Laterality Date     DENTAL SURGERY  2001    wisdom teeth     SURGICAL HISTORY OF -   1997    Rt Hip/Ischium/Testicle removal - Chang's       FAMILY HISTORY:  Family History   Problem Relation Age of Onset     Hyperlipidemia Father      Hyperlipidemia Brother      Colon Cancer Paternal Grandmother        SOCIAL HISTORY:  Social History     Socioeconomic History     Marital status:      Spouse name: Toshia     Number of children: 0     Years of education: None     Highest education level: None   Occupational History     None   Social Needs     Financial resource strain: None     Food insecurity:     Worry: None     Inability: None     Transportation needs:     Medical: None     Non-medical: None   Tobacco Use     Smoking status: Never Smoker     Smokeless tobacco: Never Used   Substance and Sexual Activity     Alcohol use: Yes     Alcohol/week: 7.2 - 10.8 oz     Comment: 12-18 drinks per week      Drug use: Yes     Types: Marijuana     Comment: marvel      Sexual activity: Yes     Partners: Female     Birth control/protection: None   Lifestyle     Physical activity:     Days per week: None     Minutes per session: None     Stress: None   Relationships     Social connections:     Talks on phone: None     Gets together: None     Attends Church service: None     Active member of club or organization: None     Attends meetings of clubs or organizations: None     Relationship status: None     Intimate partner violence:     Fear of current or ex partner:  "None     Emotionally abused: None     Physically abused: None     Forced sexual activity: None   Other Topics Concern     Parent/sibling w/ CABG, MI or angioplasty before 65F 55M? No   Social History Narrative     None       Review of Systems:  Skin:  Negative       Eyes:  Negative      ENT:  Negative      Respiratory:  Negative       Cardiovascular:  Negative      Gastroenterology: Negative      Genitourinary:  Negative      Musculoskeletal:  Negative      Neurologic:  Negative      Psychiatric:  Negative      Heme/Lymph/Imm:  Positive for allergies seasonal  Endocrine:  Negative        Physical Exam:  Vitals: /84 (BP Location: Right arm, Patient Position: Chair, Cuff Size: Adult Regular)   Pulse 84   Ht 1.753 m (5' 9\")   Wt 90.4 kg (199 lb 4.8 oz)   BMI 29.43 kg/m      Constitutional:  cooperative, alert and oriented, well developed, well nourished, in no acute distress        Skin:  warm and dry to the touch          Head:  normocephalic, no masses or lesions        Eyes:  pupils equal and round        Lymph:      ENT:           Neck:  carotid pulses are full and equal bilaterally        Respiratory:  normal symmetry         Cardiac: regular rhythm                                                         GI:  abdomen soft;no bruits        Extremities and Muscular Skeletal:  no edema              Neurological:  no gross motor deficits;affect appropriate        Psych:  Alert and Oriented x 3          CC  Ed Flores MD  [address] [place] [address]                  "

## 2019-06-06 NOTE — LETTER
6/6/2019      Ed Flores MD  [address] [facility] [id number]      RE: Agapito Yu       Dear Colleague,    I had the pleasure of seeing Agapito Yu in the [facility].    Service Date: 06/06/2019      REFERRING PHYSICIAN:  Dr. Ed Flores.      HISTORY OF PRESENT ILLNESS:  Mr. 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  He is here for a followup visit.  I saw him back in 12/2017 and had placed him on low-dose lisinopril and started him on carvedilol.  He preferred a once daily pill so I switched this to metoprolol.  I have not seen him back.  He has been doing well.  [facility] and was recommended to undergo sleep study, but he canceled the sleep study as he is unwilling to wear a CPAP mask.  It sounds like he has got a strong family history of sleep apnea.  He has otherwise been doing well.  He does not complain of any chest pains or shortness of breath.  He is running on occasion and fairly active.      PHYSICAL EXAMINATION:     VITAL SIGNS:  Today, his blood pressure is 126/84, pulse of 84, weight 199 with a body mass index of 29.   NECK:  Carotid upstrokes are brisk without bruit.   CARDIOVASCULAR:  Tones are regular without murmur, gallop or rub.   LUNGS:  Clear posteriorly.   EXTREMITIES:  He has no peripheral edema.      He has a severe mixed hyperlipidemia.  It is noted in June of last year.  LDL could not be estimated.  His triglycerides were 540, HDL 45, a total cholesterol was 291.      SUMMARY:  Mr. 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  He had a recent echocardiogram that suggests stable but continued mild to moderate LV dysfunction with an EF estimated around 42% with global hypokinesis.  There are  no valve abnormalities.  We discussed the results of his echocardiogram today.  He is asymptomatic and compensated on exam.  However, untreated sleep apnea may be contributing to his cardiomyopathy and it certainly could get worse if he has severe untreated sleep apnea.  I would encourage him to follow up and perform the sleep study.  I am not sure what happened with his medications.  He somehow lost the prescription for lisinopril.  I would like him on both low-dose metoprolol and lisinopril if possible if his blood pressure will tolerate.  He is willing to take this medicine so I will give him another prescription for 2.5 mg of lisinopril and I cautioned him about the potential side effects including hypotension.  He will contact me if he experiences any lightheadedness or dizziness.  He sees his primary on a regular basis for his cholesterol management and therefore, I will recommend to see him back in 2 years or as needed.  Please feel free to contact me with any questions you have in regards to his care.      cc:   [place] [address]         RONEL FREIRE DO             D: 2019   T: 2019   MT: DALTON      Name:     ANETTE SPANN   MRN:      [id number]        Account:      [id number]   :      1985           Service Date: 2019      Document: [id number]         Outpatient Encounter Medications as of 2019   Medication Sig Dispense Refill     lisinopril (PRINIVIL/ZESTRIL) 2.5 MG tablet Take 1 tablet (2.5 mg) by mouth daily 30 tablet 11     metoprolol succinate ER (TOPROL-XL) 25 MG 24 hr tablet Take 1 tablet (25 mg) by mouth daily 90 tablet 3     Multiple Vitamins-Minerals (MULTIVITAMIN ADULT PO)        rosuvastatin (CRESTOR) 20 MG tablet TAKE ONE TABLET BY MOUTH EVERY DAY 90 tablet 1     VIAGRA 50 MG cap/tab TK SS TO ONE T PO PRN - ONE HOUR BEFORE SEXUAL ACTIVITY  0     cyclobenzaprine (FLEXERIL) 10  MG tablet Take 0.5-1 tablets (5-10 mg) by mouth 3 times daily as needed for muscle spasms (Patient not taking: Reported on 6/6/2019) 10 tablet 1     sertraline (ZOLOFT) 100 MG tablet Take 1.5 tablets (150 mg) by mouth daily If tolerating well after 2 weeks, can increase to 2 tablets (200 mg) by mouth daily. (Patient not taking: Reported on 6/6/2019) 135 tablet 0     zolpidem (AMBIEN) 10 MG tablet Take 1 tablet (10 mg) by mouth nightly as needed for sleep (Patient not taking: Reported on 6/6/2019) 10 tablet 0     [DISCONTINUED] lisinopril (PRINIVIL/ZESTRIL) 2.5 MG tablet Take 1 tablet (2.5 mg) by mouth daily 30 tablet 11     [DISCONTINUED] metoprolol succinate (TOPROL-XL) 25 MG 24 hr tablet Take 1 tablet (25 mg) by mouth daily 90 tablet 3     No facility-administered encounter medications on file as of 6/6/2019.        Again, thank you for allowing me to participate in the care of your patient.      Sincerely,    Maia Hernandez, DO     [facility]

## 2019-06-15 ENCOUNTER — MYC MEDICAL ADVICE (OUTPATIENT)
Dept: FAMILY MEDICINE | Facility: CLINIC | Age: 34
End: 2019-06-15

## 2019-06-15 DIAGNOSIS — G47.09 OTHER INSOMNIA: Primary | ICD-10-CM

## 2019-06-17 NOTE — TELEPHONE ENCOUNTER
Dr. Riojas, see message from Agapito, advised to start e-visit for injurry--may need to be seen in clinic? Also requesting referral to sleep medicine. Shaista Holm R.N.

## 2019-06-17 NOTE — TELEPHONE ENCOUNTER
For new episode or exacerbation of back pain, would recommend starting eVisit.    Mark Riojas DO  6/17/2019 12:47 PM

## 2019-06-22 ENCOUNTER — E-VISIT (OUTPATIENT)
Dept: FAMILY MEDICINE | Facility: CLINIC | Age: 34
End: 2019-06-22
Payer: COMMERCIAL

## 2019-06-22 DIAGNOSIS — M54.2 NECK PAIN: Primary | ICD-10-CM

## 2019-06-22 PROCEDURE — 99444 ZZC PHYSICIAN ONLINE EVALUATION & MANAGEMENT SERVICE: CPT | Performed by: FAMILY MEDICINE

## 2019-06-24 ENCOUNTER — MYC MEDICAL ADVICE (OUTPATIENT)
Dept: FAMILY MEDICINE | Facility: CLINIC | Age: 34
End: 2019-06-24

## 2019-06-24 RX ORDER — CYCLOBENZAPRINE HCL 10 MG
5-10 TABLET ORAL 3 TIMES DAILY PRN
Qty: 20 TABLET | Refills: 1 | Status: SHIPPED | OUTPATIENT
Start: 2019-06-24 | End: 2020-03-30

## 2019-06-26 ENCOUNTER — OFFICE VISIT (OUTPATIENT)
Dept: SLEEP MEDICINE | Facility: CLINIC | Age: 34
End: 2019-06-26
Payer: COMMERCIAL

## 2019-06-26 VITALS
HEIGHT: 69 IN | BODY MASS INDEX: 29.77 KG/M2 | DIASTOLIC BLOOD PRESSURE: 80 MMHG | SYSTOLIC BLOOD PRESSURE: 123 MMHG | OXYGEN SATURATION: 98 % | RESPIRATION RATE: 14 BRPM | WEIGHT: 201 LBS | HEART RATE: 88 BPM

## 2019-06-26 DIAGNOSIS — R06.81 WITNESSED APNEIC SPELLS: ICD-10-CM

## 2019-06-26 DIAGNOSIS — R06.83 SNORING: ICD-10-CM

## 2019-06-26 DIAGNOSIS — I50.22 CHRONIC SYSTOLIC CONGESTIVE HEART FAILURE (H): ICD-10-CM

## 2019-06-26 DIAGNOSIS — R29.818 SUSPECTED SLEEP APNEA: Primary | ICD-10-CM

## 2019-06-26 DIAGNOSIS — R40.0 SLEEPINESS: ICD-10-CM

## 2019-06-26 PROCEDURE — 99214 OFFICE O/P EST MOD 30 MIN: CPT | Performed by: INTERNAL MEDICINE

## 2019-06-26 RX ORDER — ZOLPIDEM TARTRATE 5 MG/1
TABLET ORAL
Qty: 1 TABLET | Refills: 0 | Status: SHIPPED | OUTPATIENT
Start: 2019-06-26 | End: 2020-07-16

## 2019-06-26 ASSESSMENT — MIFFLIN-ST. JEOR: SCORE: 1847.11

## 2019-06-26 NOTE — PROGRESS NOTES
Sleep Consultation:    Date on this visit: 6/26/2019    Primary Physician: Ed Flores     Chief complaint: snoring      Presenting History:     Agapito Yu has a history of Parker's sarcoma, s/p chemotherapy and secondary cardiomyopathy from chemotherapy . Last echocardiogram from 4/25/2019 showed an EF 42%. He has been sent by Cardiology for a evaluation of sleep apnea.     He previously consulted sleep medicine last year and saw Dr. Lewis. A PSG was recommended but he did not proceed with scheduling the test.     Agapito does snore every night. Patient does have a regular bed partner. There is report of snoring and gasping.  He does have witnessed apneas. They never sleep separately.  Patient sleeps on his side and stomach. He has frequent morning dry mouth, denies no morning headaches and restless legs. Agapito denies any bruxism, sleep walking, sleep talking, dream enactment, sleep paralysis, cataplexy and hypnogogic/hypnopompic hallucinations.    Agapito goes to sleep at 10:00 PM during the week. He wakes up at 5:30 AM with an alarm. He falls asleep in 20 minutes.  Agapito denies difficulty falling asleep.  He wakes up 0-1 times a night for 5 minutes before falling back to sleep.  Agapito wakes up to go to the bathroom.  On weekends, Agapito goes to sleep at 10:30 PM.  He wakes up at 9:00 AM without an alarm. He falls asleep in 20 minutes.  Patient gets an average of 7 hours of sleep per night.      Agapito denies difficulty breathing through his nose.      Patient's [place] Sleepiness score 10/24 consistent with mild daytime sleepiness.      Agapito naps 0-1 times per week for  minutes, feels refreshed after naps. He takes no inadvertant naps.  He denies closing eyes, dozing and falling asleep while driving. Patient was counseled on the importance of driving while alert, to pull over if drowsy, or nap before getting into the vehicle if sleepy.      He uses 6 cups/day of coffee. Last caffeine intake is usually before  4 pm.    Allergies:    No Known Allergies    Medications:    Current Outpatient Medications   Medication Sig Dispense Refill     cyclobenzaprine (FLEXERIL) 10 MG tablet Take 0.5-1 tablets (5-10 mg) by mouth 3 times daily as needed for muscle spasms 20 tablet 1     lisinopril (PRINIVIL/ZESTRIL) 2.5 MG tablet Take 1 tablet (2.5 mg) by mouth daily 30 tablet 11     metoprolol succinate ER (TOPROL-XL) 25 MG 24 hr tablet Take 1 tablet (25 mg) by mouth daily 90 tablet 3     Multiple Vitamins-Minerals (MULTIVITAMIN ADULT PO)        rosuvastatin (CRESTOR) 20 MG tablet TAKE ONE TABLET BY MOUTH EVERY DAY 90 tablet 1     VIAGRA 50 MG cap/tab TK SS TO ONE T PO PRN - ONE HOUR BEFORE SEXUAL ACTIVITY  0       Problem List:  Patient Active Problem List    Diagnosis Date Noted     Nonischemic cardiomyopathy (H)      Priority: Medium     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 at age 12 - chang's - Dr Hernandez - EF = 45%       Chang's sarcoma (H)      Priority: Medium     Hyperlipidemia LDL goal <130      Priority: Medium     Major depressive disorder, recurrent episode, mild (H) 12/08/2016     Priority: Medium     Familial hypercholesterolemia 05/08/2014     Priority: Medium     History of Chang's sarcoma 03/28/2014     Priority: Medium     Overview:   S/p removal.  In remission.           Past Medical/Surgical History:  Past Medical History:   Diagnosis Date     Chang's sarcoma (H) 1997    [place] - Rt Hip     Hyperlipidemia LDL goal <130      Major depressive disorder, recurrent episode, mild (H) 12/8/2016     Mild depression (H)      Nonischemic cardiomyopathy (H)     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 at age 12 - chang's - Dr Hernandez - EF = 45%     Past Surgical History:   Procedure Laterality Date     DENTAL SURGERY  2001    wisdom teeth     SURGICAL HISTORY OF -   1997    Rt Hip/Ischium/Testicle removal - Chang's       Social History:  Social History     Socioeconomic History     Marital status:      Spouse name: Toshia     Number of children: 0      Years of education: Not on file     Highest education level: Not on file   Occupational History     Not on file   Social Needs     Financial resource strain: Not on file     Food insecurity:     Worry: Not on file     Inability: Not on file     Transportation needs:     Medical: Not on file     Non-medical: Not on file   Tobacco Use     Smoking status: Never Smoker     Smokeless tobacco: Never Used   Substance and Sexual Activity     Alcohol use: Yes     Alcohol/week: 7.2 - 10.8 oz     Comment: 12-18 drinks per week      Drug use: Yes     Types: Marijuana     Comment: marvel      Sexual activity: Yes     Partners: Female     Birth control/protection: None   Lifestyle     Physical activity:     Days per week: Not on file     Minutes per session: Not on file     Stress: Not on file   Relationships     Social connections:     Talks on phone: Not on file     Gets together: Not on file     Attends Sikh service: Not on file     Active member of club or organization: Not on file     Attends meetings of clubs or organizations: Not on file     Relationship status: Not on file     Intimate partner violence:     Fear of current or ex partner: Not on file     Emotionally abused: Not on file     Physically abused: Not on file     Forced sexual activity: Not on file   Other Topics Concern     Parent/sibling w/ CABG, MI or angioplasty before 65F 55M? No   Social History Narrative     Not on file       Family History:  Family History   Problem Relation Age of Onset     Hyperlipidemia Father      Hyperlipidemia Brother      Colon Cancer Paternal Grandmother        Review of Systems:  A complete review of systems reviewed by me is negative with the exeption of what has been mentioned in the history of present illness.  CONSTITUTIONAL: NEGATIVE for weight gain/loss, fever, chills, sweats or night sweats, drug allergies.  EYES: NEGATIVE for changes in vision, blind spots, double vision.  ENT: NEGATIVE for ear pain, sore throat,  "sinus pain, post-nasal drip, runny nose, bloody nose  CARDIAC: NEGATIVE for fast heartbeats or fluttering in chest, chest pain or pressure, breathlessness when lying flat, swollen legs or swollen feet.  NEUROLOGIC: NEGATIVE headaches, weakness or numbness in the arms or legs.  DERMATOLOGIC: NEGATIVE for rashes, new moles or change in mole(s)  PULMONARY:  POSITIVE for  SOB with activity  GASTROINTESTINAL: NEGATIVE for nausea or vomitting, loose or watery stools, fat or grease in stools, constipation, abdominal pain, bowel movements black in color or blood noted.  GENITOURINARY: NEGATIVE for pain during urination, blood in urine, urinating more frequently than usual, irregular menstrual periods.  MUSCULOSKELETAL: NEGATIVE for muscle pain, bone or joint pain, swollen joints.  ENDOCRINE: NEGATIVE for increased thirst or urination, diabetes.  LYMPHATIC: NEGATIVE for swollen lymph nodes, lumps or bumps in the breasts or nipple discharge.    Physical Examination:  Vitals: /80   Pulse 88   Resp 14   Ht 1.753 m (5' 9\")   Wt 91.2 kg (201 lb)   SpO2 98%   BMI 29.68 kg/m    BMI= Body mass index is 29.68 kg/m .         [place] Total Score 6/26/2019   Total score - [place] 10       NATHALY Total Score: 12 (06/26/19 0931)    GENERAL APPEARANCE: healthy, alert and no distress  EYES: Eyes grossly normal to inspection, PERRL and conjunctivae and sclerae normal  HENT: nose and mouth without ulcers or lesions, oropharynx crowded, uvula elongated, soft palate dependent and tongue base enlarged  NECK: no adenopathy, no asymmetry, masses, or scars and thyroid normal to palpation  RESP: lungs clear to auscultation - no rales, rhonchi or wheezes  ABDOMEN: soft, nontender, without hepatosplenomegaly or masses and bowel sounds normal  MS: extremities normal- no gross deformities noted  NEURO: Normal strength and tone, mentation intact and speech normal  PSYCH: mentation appears normal and affect normal/bright  Mallampati Class: " IV.  Tonsillar Stage: 1  hidden by pillars.    Impression/Plan:    1. Probable obstructive sleep apnea  2. Nonischemic cardiomyopathy (EF 42%)   3. Parker's sarcoma   4. Daytime sleepiness    - Patient has symptoms concerning for sleep apnea including loud snoring, witnessed apneas and daytime sleepiness. Oropharynx is crowded on examination. There is an intermediate to high risk for sleep apnea and an overnight sleep study is recommended for evaluation and treatment planning.     Plan:     1. Split night PSG for assessment of sleep apnea         He will follow up with me in approximately two weeks after his sleep study has been competed to review the results and discuss plan of care.       Polysomnography reviewed.  Obstructive sleep apnea reviewed.  Complications of untreated sleep apnea were reviewed.    I spent a total of 30 minutes with patient with more than 50% in counseing     Dr. Soto Oh     CC: No ref. provider found

## 2019-06-26 NOTE — NURSING NOTE
"Chief Complaint   Patient presents with     Sleep Problem       Initial /80   Pulse 88   Resp 14   Ht 1.753 m (5' 9\")   Wt 91.2 kg (201 lb)   SpO2 98%   BMI 29.68 kg/m   Estimated body mass index is 29.68 kg/m  as calculated from the following:    Height as of this encounter: 1.753 m (5' 9\").    Weight as of this encounter: 91.2 kg (201 lb).    Medication Reconciliation: complete     ESS 10  Jenni Felipe      "

## 2019-06-26 NOTE — PATIENT INSTRUCTIONS
Your BMI is Body mass index is 29.68 kg/m .  Weight management is a personal decision.  If you are interested in exploring weight loss strategies, the following discussion covers the approaches that may be successful. Body mass index (BMI) is one way to tell whether you are at a healthy weight, overweight, or obese. It measures your weight in relation to your height.  A BMI of 18.5 to 24.9 is in the healthy range. A person with a BMI of 25 to 29.9 is considered overweight, and someone with a BMI of 30 or greater is considered obese. More than two-thirds of American adults are considered overweight or obese.  Being overweight or obese increases the risk for further weight gain. Excess weight may lead to heart disease and diabetes.  Creating and following plans for healthy eating and physical activity may help you improve your health.  Weight control is part of healthy lifestyle and includes exercise, emotional health, and healthy eating habits. Careful eating habits lifelong are the mainstay of weight control. Though there are significant health benefits from weight loss, long-term weight loss with diet alone may be very difficult to achieve- studies show long-term success with dietary management in less than 10% of people. Attaining a healthy weight may be especially difficult to achieve in those with severe obesity. In some cases, medications, devices and surgical management might be considered.  What can you do?  If you are overweight or obese and are interested in methods for weight loss, you should discuss this with your provider.     Consider reducing daily calorie intake by 500 calories.     Keep a food journal.     Avoiding skipping meals, consider cutting portions instead.    Diet combined with exercise helps maintain muscle while optimizing fat loss. Strength training is particularly important for building and maintaining muscle mass. Exercise helps reduce stress, increase energy, and improves fitness.  Increasing exercise without diet control, however, may not burn enough calories to loose weight.       Start walking three days a week 10-20 minutes at a time    Work towards walking thirty minutes five days a week     Eventually, increase the speed of your walking for 1-2 minutes at time    In addition, we recommend that you review healthy lifestyles and methods for weight loss available through the National Institutes of Health patient information sites:  http://win.niddk.nih.gov/publications/index.htm    And look into health and wellness programs that may be available through your health insurance provider, employer, local community center, or bebe club.    Weight management plan: Patient was referred to their PCP to discuss a diet and exercise plan.

## 2019-07-24 ENCOUNTER — THERAPY VISIT (OUTPATIENT)
Dept: SLEEP MEDICINE | Facility: CLINIC | Age: 34
End: 2019-07-24
Payer: COMMERCIAL

## 2019-07-24 DIAGNOSIS — R29.818 SUSPECTED SLEEP APNEA: ICD-10-CM

## 2019-07-24 DIAGNOSIS — R06.81 WITNESSED APNEIC SPELLS: ICD-10-CM

## 2019-07-24 DIAGNOSIS — I50.22 CHRONIC SYSTOLIC CONGESTIVE HEART FAILURE (H): ICD-10-CM

## 2019-07-24 DIAGNOSIS — R06.83 SNORING: ICD-10-CM

## 2019-07-24 DIAGNOSIS — G47.33 OSA (OBSTRUCTIVE SLEEP APNEA): ICD-10-CM

## 2019-07-24 DIAGNOSIS — R40.0 SLEEPINESS: ICD-10-CM

## 2019-07-24 PROCEDURE — 95810 POLYSOM 6/> YRS 4/> PARAM: CPT | Performed by: INTERNAL MEDICINE

## 2019-07-28 PROBLEM — G47.33 OSA (OBSTRUCTIVE SLEEP APNEA): Status: ACTIVE | Noted: 2019-07-28

## 2019-07-29 LAB — SLPCOMP: NORMAL

## 2019-07-29 NOTE — PROCEDURES
"SLEEP STUDY INTERPRETATION  DIAGNOSTIC POLYSOMNOGRAPHY REPORT      Patient: ANETTE SPANN  YOB: 1985  Study Date: 7/24/2019  MRN: [id number]  Referring Provider: SELF  Ordering Provider: BRAEDEN CORDOVA MD    Indications for Polysomnography: The patient is a 33 y old Male who is 5' 9\" and weighs 201.0 lbs. His BMI is 29.8, [place] sleepiness scale 10.0 and neck circumference is 0.0 cm. 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 A diagnostic polysomnogram was performed to evaluate for sleep apnea.    Polysomnogram Data: A full night polysomnogram recorded the standard physiologic parameters including EEG, EOG, EMG, ECG, nasal and oral airflow. Respiratory parameters of chest and abdominal movements were recorded with respiratory inductance plethysmography. Oxygen saturation was recorded by pulse oximetry. Hypopnea scoring rule used: 1B 4%.    Sleep Architecture: Sleep was fragmented.   The total recording time of the polysomnogram was 490.1 minutes. The total sleep time was 460.5 minutes. Sleep latency was decreased at 0.7 minutes with the use of a sleep aid (Zolpidem 5 mg). REM latency was 49.0 minutes. Arousal index was increased at 29.6 arousals per hour. Sleep efficiency was normal at 94.0%. Wake after sleep onset was 28.0 minutes. The patient spent 4.2% of total sleep time in Stage N1, 46.0% in Stage N2, 22.4% in Stage N3, and 27.4% in REM. Time in REM supine was 41.5 minutes.    Respiration: Moderate obstructive sleep apnea.     Events ? The polysomnogram revealed a presence of 86 obstructive, 48 central, and - mixed apneas resulting in an apnea index of 17.5 events per hour. There were 24 obstructive hypopneas and - central hypopneas resulting in an obstructive hypopnea index of 3.1 and central hypopnea index of - events per hour. The combined apnea/hypopnea index was 20.6 events per hour (central apnea/hypopnea index was 6.3 " events per hour). The REM AHI was 32.9 events per hour. The supine AHI was 52.4 events per hour. The RERA index was 2.9 events per hour.  The RDI was 23.5 events per hour.    Snoring - was reported as moderate.    Respiratory rate and pattern - was notable for normal respiratory rate and pattern.    Sustained Sleep Associated Hypoventilation - Transcutaneous carbon dioxide monitoring was not used; however significant hypoventilation was not suggested by oximetry.    Sleep Associated Hypoxemia - (Greater than 5 minutes O2 sat at or below 88%) was not present. Baseline oxygen saturation was 94.0%. Lowest oxygen saturation was 87.5%. Time spent less than or equal to 88% was 0.1 minutes. Time spent less than or equal to 89% was 0.2 minutes.    Movement Activity: There was no significant abnormality.    Periodic Limb Activity - There were 44 PLMs during the entire study. The PLM index was 5.7 movements per hour. The PLM Arousal Index was 3.1 per hour.    REM EMG Activity - Excessive transient/sustained muscle activity was not present.    Nocturnal Behavior - Abnormal sleep related behaviors were not noted during/arising out of NREM / REM sleep.    Bruxism - None apparent.    Cardiac Summary: Sinus rhythm.   The average pulse rate was 82.7 bpm. The minimum pulse rate was 64.9 bpm while the maximum pulse rate was 120.1 bpm.  Arrhythmias were not noted.    Assessment:     This sleep study shows moderate sleep apnea with supine predominance. Majority of respiratory events were obstructive in nature with a minor component of central apneas.     Recommendations:    Depending on clinical indications for treatment of moderate obstructive sleep apnea, consider following options;     Based on the presence of moderate obstructive sleep apnea, treatment could be empirically initiated with Auto?titrating PAP therapy with a range of 5 to 15 cmH2O. Recommend clinical follow up with sleep management team.    Patient may be a candidate  for dental appliance through referral to Sleep Dentistry for the treatment of obstructive sleep apnea.    Weight management.     Diagnostic Codes:   Obstructive Sleep Apnea G47.33  Repetitive Intrusions Into Sleep F51.8    7/24/2019 [facility] Study (201.0 lbs) - AHI 20.6, RDI 23.5, Supine AHI 52.4, REM AHI 32.9, Low O2 87.5%, Time Spent ?88% 0.1 minutes / Time Spent ?89% 0.2 minutes.     _____________________________________   Electronically Signed By Soto Oh MD 07/28/2019

## 2019-08-09 ENCOUNTER — VIRTUAL VISIT (OUTPATIENT)
Dept: SLEEP MEDICINE | Facility: CLINIC | Age: 34
End: 2019-08-09
Payer: COMMERCIAL

## 2019-08-09 DIAGNOSIS — G47.33 OSA (OBSTRUCTIVE SLEEP APNEA): Primary | ICD-10-CM

## 2019-08-09 PROCEDURE — 99441 ZZC PHYSICIAN TELEPHONE EVALUATION 5-10 MIN: CPT | Performed by: INTERNAL MEDICINE

## 2019-08-09 NOTE — PROGRESS NOTES
Phone Sleep Study Follow-Up :    Date : 8/9/2019    Agapito Yu was contacted today for follow-up of his sleep study done on 7/24/2019 at the [facility] for possible sleep apnea.    Sleep latency 0.7 minutes with Ambien.  REM achieved.   REM latency 49 minutes.  Sleep efficiency 94%. Total sleep time 460 minutes.    Sleep architecture:  Stage 1, 4% (5%), stage 2, 46% (45-55%), stage 3, 22.4% (15-20%), stage REM, 27.4% (20-25%).  AHI was 20.6, without significant desaturations. RDI 23.5.  REM AHI 32.9, consistent with severe REM YVONNE.  Supine AHI 52.4, consistent with severe SUPINE YVONNE.  Periodic Limb Movement Index 5.7/hour.       These findings were reviewed with patient.     Past medical/surgical history, family history, social history, medications and allergies were reviewed.      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  In remission.           Impression/Plan:    1. Obstructive sleep apnea, moderate, supine positional      - Patient was counseled regarding moderate sleep apnea, consequences and management options. CPAP therapy is the treatment of choice. We reviewed oral appliance, upper airway surgery and positional therapy. Patient says that he would not use CPAP and opts for dental appliance, I recommended positional restriction along with dental appliance for optimal treatment.     Plan:     1. Referral to dental sleep  medicine for oral appliance therapy     Seven minutes spent with patient, all of which were spent counseling, consulting, coordinating plan of care.      Dr. Soto Oh    CC: Ed Flores

## 2019-11-26 ENCOUNTER — E-VISIT (OUTPATIENT)
Dept: FAMILY MEDICINE | Facility: CLINIC | Age: 34
End: 2019-11-26
Payer: COMMERCIAL

## 2019-11-26 DIAGNOSIS — F33.0 MAJOR DEPRESSIVE DISORDER, RECURRENT EPISODE, MILD (H): Primary | ICD-10-CM

## 2019-11-26 DIAGNOSIS — I42.9 SECONDARY CARDIOMYOPATHY (H): Primary | ICD-10-CM

## 2019-11-26 PROCEDURE — 99444 ZZC PHYSICIAN ONLINE EVALUATION & MANAGEMENT SERVICE: CPT | Performed by: FAMILY MEDICINE

## 2019-11-26 RX ORDER — LOSARTAN POTASSIUM 25 MG/1
12.5 TABLET ORAL DAILY
Qty: 45 TABLET | Refills: 3 | Status: SHIPPED | OUTPATIENT
Start: 2019-11-26 | End: 2020-06-18

## 2019-11-26 ASSESSMENT — ANXIETY QUESTIONNAIRES
GAD7 TOTAL SCORE: 13
4. TROUBLE RELAXING: MORE THAN HALF THE DAYS
1. FEELING NERVOUS, ANXIOUS, OR ON EDGE: MORE THAN HALF THE DAYS
2. NOT BEING ABLE TO STOP OR CONTROL WORRYING: MORE THAN HALF THE DAYS
GAD7 TOTAL SCORE: 13
GAD7 TOTAL SCORE: 13
3. WORRYING TOO MUCH ABOUT DIFFERENT THINGS: MORE THAN HALF THE DAYS
7. FEELING AFRAID AS IF SOMETHING AWFUL MIGHT HAPPEN: SEVERAL DAYS
7. FEELING AFRAID AS IF SOMETHING AWFUL MIGHT HAPPEN: SEVERAL DAYS
6. BECOMING EASILY ANNOYED OR IRRITABLE: NEARLY EVERY DAY
5. BEING SO RESTLESS THAT IT IS HARD TO SIT STILL: SEVERAL DAYS

## 2019-11-26 ASSESSMENT — PATIENT HEALTH QUESTIONNAIRE - PHQ9
10. IF YOU CHECKED OFF ANY PROBLEMS, HOW DIFFICULT HAVE THESE PROBLEMS MADE IT FOR YOU TO DO YOUR WORK, TAKE CARE OF THINGS AT HOME, OR GET ALONG WITH OTHER PEOPLE: SOMEWHAT DIFFICULT
SUM OF ALL RESPONSES TO PHQ QUESTIONS 1-9: 12
SUM OF ALL RESPONSES TO PHQ QUESTIONS 1-9: 12

## 2019-11-27 ASSESSMENT — ANXIETY QUESTIONNAIRES: GAD7 TOTAL SCORE: 13

## 2019-11-27 ASSESSMENT — PATIENT HEALTH QUESTIONNAIRE - PHQ9: SUM OF ALL RESPONSES TO PHQ QUESTIONS 1-9: 12

## 2020-03-10 ENCOUNTER — HEALTH MAINTENANCE LETTER (OUTPATIENT)
Age: 35
End: 2020-03-10

## 2020-03-26 DIAGNOSIS — E78.5 HYPERLIPIDEMIA LDL GOAL <130: ICD-10-CM

## 2020-03-26 RX ORDER — ROSUVASTATIN CALCIUM 20 MG/1
TABLET, COATED ORAL
Qty: 90 TABLET | Refills: 0 | Status: SHIPPED | OUTPATIENT
Start: 2020-03-26 | End: 2020-07-01

## 2020-03-27 ENCOUNTER — E-VISIT (OUTPATIENT)
Dept: FAMILY MEDICINE | Facility: CLINIC | Age: 35
End: 2020-03-27

## 2020-03-27 DIAGNOSIS — M54.6 ACUTE THORACIC BACK PAIN, UNSPECIFIED BACK PAIN LATERALITY: Primary | ICD-10-CM

## 2020-03-27 PROCEDURE — 99421 OL DIG E/M SVC 5-10 MIN: CPT | Performed by: FAMILY MEDICINE

## 2020-03-28 ENCOUNTER — MYC MEDICAL ADVICE (OUTPATIENT)
Dept: FAMILY MEDICINE | Facility: CLINIC | Age: 35
End: 2020-03-28

## 2020-03-28 DIAGNOSIS — M54.2 NECK PAIN: ICD-10-CM

## 2020-03-28 RX ORDER — METHYLPREDNISOLONE 4 MG
TABLET, DOSE PACK ORAL
Qty: 21 TABLET | Refills: 0 | Status: SHIPPED | OUTPATIENT
Start: 2020-03-28 | End: 2020-07-16

## 2020-03-30 RX ORDER — CYCLOBENZAPRINE HCL 10 MG
5-10 TABLET ORAL 3 TIMES DAILY PRN
Qty: 20 TABLET | Refills: 1 | Status: SHIPPED | OUTPATIENT
Start: 2020-03-30 | End: 2020-07-16

## 2020-03-30 NOTE — TELEPHONE ENCOUNTER
See [employer] message. I pended pharmacy.    cyclobenzaprine (FLEXERIL) 10 MG tablet  Last Written Prescription Date:  6/24/19  Last Fill Quantity: 20,   # refills: 1  Last Office Visit: 7/26/18  Future Office visit:       Routing refill request to provider for review/approval because:  Drug not on the [facility], [facility] or [facility] refill protocol or controlled substance    Yvonne Shen RN- Triage [employer]

## 2020-06-18 DIAGNOSIS — I42.9 SECONDARY CARDIOMYOPATHY (H): Primary | ICD-10-CM

## 2020-06-18 RX ORDER — LOSARTAN POTASSIUM 25 MG/1
25 TABLET ORAL DAILY
Qty: 90 TABLET | Refills: 3 | Status: SHIPPED | OUTPATIENT
Start: 2020-06-18 | End: 2021-06-21

## 2020-07-01 DIAGNOSIS — E78.5 HYPERLIPIDEMIA LDL GOAL <130: ICD-10-CM

## 2020-07-01 DIAGNOSIS — I42.8 NONISCHEMIC CARDIOMYOPATHY (H): ICD-10-CM

## 2020-07-01 RX ORDER — METOPROLOL SUCCINATE 25 MG/1
25 TABLET, EXTENDED RELEASE ORAL DAILY
Qty: 90 TABLET | Refills: 3 | Status: SHIPPED | OUTPATIENT
Start: 2020-07-01 | End: 2021-07-26

## 2020-07-01 RX ORDER — ROSUVASTATIN CALCIUM 20 MG/1
TABLET, COATED ORAL
Qty: 90 TABLET | Refills: 0 | Status: SHIPPED | OUTPATIENT
Start: 2020-07-01 | End: 2020-07-16

## 2020-07-01 NOTE — TELEPHONE ENCOUNTER
Routing refill request to provider for review/approval because:  Labs not current:  Lipid  Frances Acevedo RN   [facility] - Triage

## 2020-07-02 NOTE — TELEPHONE ENCOUNTER
Spoke with patient and told him he's due for an annual visit, said he would set it up through my chart when he was able to look at his schedule.     Izzy Hodges MA

## 2020-07-10 ENCOUNTER — TELEPHONE (OUTPATIENT)
Dept: OTOLARYNGOLOGY | Facility: CLINIC | Age: 35
End: 2020-07-10

## 2020-07-10 NOTE — TELEPHONE ENCOUNTER
Called patient to schedule, per Glenna RN:    Appointment Type - VIDEO / TELEPHONE VISIT RETURN  Provider - Dr. Martin  Appointment Notes - Follow up    LVM w/ scheduling instructions and ENT call center number. Patient is welcome to schedule video or telephone visit in next available 'RETURN' slot of Dr. Martin's.

## 2020-07-10 NOTE — TELEPHONE ENCOUNTER
[facility] Call Center    Phone Message    May a detailed message be left on voicemail: yes     Reason for Call: Other: Pt called to see if his Sleep Study from a year ago was still valid and if he needed a new one. Pt would also like to set up an Appt with Dr. Martin, I called the back line for clarification with no answer. Please call Pt back. Thank you     Action Taken: Message routed to:  [facility] ENT    Travel Screening: Not Applicable

## 2020-07-13 NOTE — TELEPHONE ENCOUNTER
FUTURE VISIT INFORMATION      FUTURE VISIT INFORMATION:    Date: 7/16/2020    Time: 8:05AM    Location: [facility]  REFERRAL INFORMATION:    Referring provider:      Referring providers clinic:      Reason for visit/diagnosis  follow up/ regarding sleep study    RECORDS REQUESTED FROM:       Clinic name Comments Records Status Imaging Status   [facility]   8/9/19 note from Dr Soto Oh  7/24/19 SLEEP STUDY  Epic

## 2020-07-16 ENCOUNTER — VIRTUAL VISIT (OUTPATIENT)
Dept: OTOLARYNGOLOGY | Facility: CLINIC | Age: 35
End: 2020-07-16
Payer: COMMERCIAL

## 2020-07-16 ENCOUNTER — OFFICE VISIT (OUTPATIENT)
Dept: FAMILY MEDICINE | Facility: CLINIC | Age: 35
End: 2020-07-16
Payer: COMMERCIAL

## 2020-07-16 ENCOUNTER — PRE VISIT (OUTPATIENT)
Dept: OTOLARYNGOLOGY | Facility: CLINIC | Age: 35
End: 2020-07-16

## 2020-07-16 VITALS — HEIGHT: 69 IN | BODY MASS INDEX: 28.88 KG/M2 | WEIGHT: 195 LBS

## 2020-07-16 VITALS
TEMPERATURE: 97.2 F | DIASTOLIC BLOOD PRESSURE: 88 MMHG | HEART RATE: 129 BPM | WEIGHT: 202 LBS | BODY MASS INDEX: 29.92 KG/M2 | HEIGHT: 69 IN | OXYGEN SATURATION: 98 % | SYSTOLIC BLOOD PRESSURE: 122 MMHG

## 2020-07-16 DIAGNOSIS — Z00.00 ROUTINE GENERAL MEDICAL EXAMINATION AT A HEALTH CARE FACILITY: Primary | ICD-10-CM

## 2020-07-16 DIAGNOSIS — C41.9 EWING'S SARCOMA (H): ICD-10-CM

## 2020-07-16 DIAGNOSIS — M76.31 IT BAND SYNDROME, RIGHT: ICD-10-CM

## 2020-07-16 DIAGNOSIS — M54.2 NECK PAIN: ICD-10-CM

## 2020-07-16 DIAGNOSIS — G47.33 OSA (OBSTRUCTIVE SLEEP APNEA): Primary | ICD-10-CM

## 2020-07-16 DIAGNOSIS — E78.5 HYPERLIPIDEMIA LDL GOAL <130: ICD-10-CM

## 2020-07-16 DIAGNOSIS — F33.0 MAJOR DEPRESSIVE DISORDER, RECURRENT EPISODE, MILD (H): ICD-10-CM

## 2020-07-16 DIAGNOSIS — I50.22 CHRONIC SYSTOLIC CONGESTIVE HEART FAILURE (H): ICD-10-CM

## 2020-07-16 PROCEDURE — 99213 OFFICE O/P EST LOW 20 MIN: CPT | Mod: 25 | Performed by: NURSE PRACTITIONER

## 2020-07-16 PROCEDURE — 99395 PREV VISIT EST AGE 18-39: CPT | Performed by: NURSE PRACTITIONER

## 2020-07-16 RX ORDER — ROSUVASTATIN CALCIUM 20 MG/1
20 TABLET, COATED ORAL DAILY
Qty: 90 TABLET | Refills: 0 | Status: SHIPPED | OUTPATIENT
Start: 2020-07-16 | End: 2020-11-24

## 2020-07-16 RX ORDER — CYCLOBENZAPRINE HCL 10 MG
5-10 TABLET ORAL 3 TIMES DAILY PRN
Qty: 20 TABLET | Refills: 1 | Status: SHIPPED | OUTPATIENT
Start: 2020-07-16 | End: 2021-12-06

## 2020-07-16 ASSESSMENT — PAIN SCALES - GENERAL: PAINLEVEL: NO PAIN (0)

## 2020-07-16 ASSESSMENT — MIFFLIN-ST. JEOR
SCORE: 1814.89
SCORE: 1846.65

## 2020-07-16 ASSESSMENT — ANXIETY QUESTIONNAIRES
3. WORRYING TOO MUCH ABOUT DIFFERENT THINGS: NOT AT ALL
2. NOT BEING ABLE TO STOP OR CONTROL WORRYING: NOT AT ALL
IF YOU CHECKED OFF ANY PROBLEMS ON THIS QUESTIONNAIRE, HOW DIFFICULT HAVE THESE PROBLEMS MADE IT FOR YOU TO DO YOUR WORK, TAKE CARE OF THINGS AT HOME, OR GET ALONG WITH OTHER PEOPLE: NOT DIFFICULT AT ALL
1. FEELING NERVOUS, ANXIOUS, OR ON EDGE: SEVERAL DAYS
7. FEELING AFRAID AS IF SOMETHING AWFUL MIGHT HAPPEN: NOT AT ALL
6. BECOMING EASILY ANNOYED OR IRRITABLE: NOT AT ALL
5. BEING SO RESTLESS THAT IT IS HARD TO SIT STILL: NOT AT ALL
GAD7 TOTAL SCORE: 1

## 2020-07-16 ASSESSMENT — PATIENT HEALTH QUESTIONNAIRE - PHQ9
5. POOR APPETITE OR OVEREATING: NOT AT ALL
SUM OF ALL RESPONSES TO PHQ QUESTIONS 1-9: 2

## 2020-07-16 NOTE — PROGRESS NOTES
"Agapito Yu is a 34 year old male who is being evaluated via a billable telephone visit.      The patient has been notified of following:     \"This telephone visit will be conducted via a call between you and your physician/provider. We have found that certain health care needs can be provided without the need for a physical exam.  This service lets us provide the care you need with a short phone conversation.  If a prescription is necessary we can send it directly to your pharmacy.  If lab work is needed we can place an order for that and you can then stop by our lab to have the test done at a later time.    Telephone visits are billed at different rates depending on your insurance coverage. During this emergency period, for some insurers they may be billed the same as an in-person visit.  Please reach out to your insurance provider with any questions.    If during the course of the call the physician/provider feels a telephone visit is not appropriate, you will not be charged for this service.\"    Patient has given verbal consent for Telephone visit?  Yes    What phone number would you like to be contacted at? [phone]    How would you like to obtain your AVS? Luz Elena    Phone call duration: *** minutes    {signature options:[id number]}    "

## 2020-07-16 NOTE — PATIENT INSTRUCTIONS
A sleep medicine referral was made for you today.  Please set up an appointment with him to reestablish care and to start CPAP.  Please try CPAP for 1 to 2 months.  If you are finding that it is difficult to tolerate CPAP then we would recommend that you call the ENT clinic at [phone], and asked to be set up for a drug-induced sleep endoscopy.

## 2020-07-16 NOTE — PROGRESS NOTES
3  SUBJECTIVE:   CC: Agapito Yu is an 34 year old male who presents for preventive health visit.     Healthy Habits:    Answers for HPI/ROS submitted by the patient on 7/15/2020   Annual Exam:  Frequency of exercise:: 2-3 days/week  Getting at least 3 servings of Calcium per day:: Yes  Diet:: Breakfast skipped  Taking medications regularly:: Yes  Medication side effects:: Muscle aches  Bi-annual eye exam:: Yes  Dental care twice a year:: Yes  Sleep apnea or symptoms of sleep apnea:: Excessive snoring, Sleep apnea      Duration of exercise:: 30-45 minutes      Additional concerns today:: No - right thigh- painful for a while 3-4 months - shooting pain- intermittently every few days- no injury- noticed was sore a little bit more due to going to the gym this past weekend.  Leg acted up a little more than normal after exercise.    Alleviated with use of Aleve and Tylenol.        Hyperlipidemia:    Has been on Rosuvastatin 20 mg daily, tolerating well.        MDD:  History of seasonal affective disorder. Not currently on Sertraline.      Today's PHQ-2 Score:   PHQ-2 ( 1999 Pfizer) 7/16/2020 7/15/2020   Q1: Little interest or pleasure in doing things 0 0   Q2: Feeling down, depressed or hopeless 0 0   PHQ-2 Score 0 0   Q1: Little interest or pleasure in doing things - Not at all   Q2: Feeling down, depressed or hopeless - Not at all   PHQ-2 Score - 0       Abuse: Current or Past(Physical, Sexual or Emotional)- No  Do you feel safe in your environment? Yes      Social History     Tobacco Use     Smoking status: Never Smoker     Smokeless tobacco: Never Used   Substance Use Topics     Alcohol use: Yes     Alcohol/week: 12.0 - 18.0 standard drinks     Comment: 12-18 drinks per week      If you drink alcohol do you typically have >3 drinks per day or >7 drinks per week? Yes - AUDIT SCORE:  9  AUDIT - Alcohol Use Disorders Identification Test - Reproduced from the World Health Organization Audit 2001 (Second Edition)  7/16/2020   1.  How often do you have a drink containing alcohol? 4 or more times a week   2.  How many drinks containing alcohol do you have on a typical day when you are drinking? 5 or 6   3.  How often do you have five or more drinks on one occasion? Weekly   4.  How often during the last year have you found that you were not able to stop drinking once you had started? Never   5.  How often during the last year have you failed to do what was normally expected of you because of drinking? Never   6.  How often during the last year have you needed a first drink in the morning to get yourself going after a heavy drinking session? Never   7.  How often during the last year have you had a feeling of guilt or remorse after drinking? Never   8.  How often during the last year have you been unable to remember what happened the night before because of your drinking? Never   9.  Have you or someone else been injured because of your drinking? No   10. Has a relative, friend, doctor or other health care worker been concerned about your drinking or suggested you cut down? No   TOTAL SCORE 9                         Last PSA: No results found for: PSA    Reviewed orders with patient. Reviewed health maintenance and updated orders accordingly - Yes  BP Readings from Last 3 Encounters:   07/16/20 122/88   06/26/19 123/80   06/06/19 126/84    Wt Readings from Last 3 Encounters:   07/16/20 91.6 kg (202 lb)   07/16/20 88.5 kg (195 lb)   06/26/19 91.2 kg (201 lb)                  Patient Active Problem List   Diagnosis     Familial hypercholesterolemia     History of Parker's sarcoma     Major depressive disorder, recurrent episode, mild (H)     Parker's sarcoma (H)     Hyperlipidemia LDL goal <130     Nonischemic cardiomyopathy (H)     YVONNE (obstructive sleep apnea)     Past Surgical History:   Procedure Laterality Date     DENTAL SURGERY  2001    wisdom teeth     SURGICAL HISTORY OF -   1997    Rt Hip/Ischium/Testicle removal - Parker's        Social History     Tobacco Use     Smoking status: Never Smoker     Smokeless tobacco: Never Used   Substance Use Topics     Alcohol use: Yes     Alcohol/week: 12.0 - 18.0 standard drinks     Comment: 12-18 drinks per week      Family History   Problem Relation Age of Onset     Hyperlipidemia Father      Hyperlipidemia Brother      Colon Cancer Paternal Grandmother      Hyperlipidemia Brother      Mental Illness Other         Suicide / Bi-polar         Current Outpatient Medications   Medication Sig Dispense Refill     cyclobenzaprine (FLEXERIL) 10 MG tablet Take 0.5-1 tablets (5-10 mg) by mouth 3 times daily as needed for muscle spasms 20 tablet 1     rosuvastatin (CRESTOR) 20 MG tablet Take 1 tablet (20 mg) by mouth daily 90 tablet 0     losartan (COZAAR) 25 MG tablet Take 1 tablet (25 mg) by mouth daily 90 tablet 3     metoprolol succinate ER (TOPROL-XL) 25 MG 24 hr tablet Take 1 tablet (25 mg) by mouth daily 90 tablet 3     VIAGRA 50 MG cap/tab TK SS TO ONE T PO PRN - ONE HOUR BEFORE SEXUAL ACTIVITY  0       Reviewed and updated as needed this visit by clinical staff  Tobacco  Allergies  Meds         Reviewed and updated as needed this visit by Provider        Past Medical History:   Diagnosis Date     Chang's sarcoma (H) 1997    [place] - Rt Hip     Hyperlipidemia LDL goal <130      Major depressive disorder, recurrent episode, mild (H) 12/8/2016     Mild depression (H)      Nonischemic cardiomyopathy (H)     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 at age 12 - chang's - Dr Hernandez - EF = 45%      Past Surgical History:   Procedure Laterality Date     DENTAL SURGERY  2001    wisdom teeth     SURGICAL HISTORY OF -   1997    Rt Hip/Ischium/Testicle removal - Chang's       ROS:  CONSTITUTIONAL: NEGATIVE for fever, chills, change in weight  INTEGUMENTARY/SKIN: NEGATIVE for worrisome rashes, moles or lesions  EYES: NEGATIVE for vision changes or irritation  ENT: NEGATIVE for ear, mouth and throat problems  RESP: NEGATIVE  "for significant cough or SOB  CV: NEGATIVE for chest pain, palpitations or peripheral edema  GI: NEGATIVE for nausea, abdominal pain, heartburn, or change in bowel habits   male: negative for dysuria, hematuria, decreased urinary stream, erectile dysfunction, urethral discharge  MUSCULOSKELETAL: NEGATIVE for significant arthralgias or myalgia  NEURO: NEGATIVE for weakness, dizziness or paresthesias  PSYCHIATRIC: NEGATIVE for changes in mood or affect    OBJECTIVE:   /88   Pulse 129   Temp 97.2  F (36.2  C) (Oral)   Ht 1.753 m (5' 9\")   Wt 91.6 kg (202 lb)   SpO2 98%   BMI 29.83 kg/m    EXAM:  GENERAL: healthy, alert and no distress  EYES: Eyes grossly normal to inspection, PERRL and conjunctivae and sclerae normal  HENT: ear canals and TM's normal, nose and mouth without ulcers or lesions  NECK: no adenopathy, no asymmetry, masses, or scars and thyroid normal to palpation  RESP: lungs clear to auscultation - no rales, rhonchi or wheezes  CV: regular rate and rhythm, normal S1 S2, no S3 or S4, no murmur, click or rub, no peripheral edema  ABDOMEN: soft, nontender, no hepatosplenomegaly, no masses and bowel sounds normal  MS: no gross musculoskeletal defects noted, no edema  SKIN: no suspicious lesions or rashes  NEURO: Normal strength and tone, mentation intact and speech normal  PSYCH: mentation appears normal, affect normal/bright      ASSESSMENT/PLAN:     Agapito was seen today for physical.    Diagnoses and all orders for this visit:    Routine general medical examination at a health care facility    Hyperlipidemia LDL goal <130  -     Lipid panel reflex to direct LDL Fasting; Future  -     rosuvastatin (CRESTOR) 20 MG tablet; Take 1 tablet (20 mg) by mouth daily    Major depressive disorder, recurrent episode, mild (H)  Currently not active.  History of seasonal affective disorder.  Not currently taking Sertraline.    PHQ 6/28/2018 11/26/2019 7/16/2020   PHQ-9 Total Score 4 12 2   Q9: Thoughts of " "better off dead/self-harm past 2 weeks Not at all Not at all Not at all     FABIAN-7 SCORE 12/26/2018 11/26/2019 7/16/2020   Total Score 10 (moderate anxiety) 13 (moderate anxiety) -   Total Score 10 13 1         Chronic systolic congestive heart failure (H)  -     BASIC METABOLIC PANEL; Future  -     ALT; Future  -     CBC with Platelets  ; Future    Neck pain - intermittent  -     cyclobenzaprine (FLEXERIL) 10 MG tablet; Take 0.5-1 tablets (5-10 mg) by mouth 3 times daily as needed for muscle spasms    It band syndrome, right  Recommended ice/heat application, massage, stretching and consideration regarding PT.  Patient declined PT at this time.      Parker's sarcoma (H)  Annual surveillance of testosterone level.    -     **Testosterone Free and Total FUTURE anytime; Future          COUNSELING:  Reviewed preventive health counseling, as reflected in patient instructions       Regular exercise       Healthy diet/nutrition    Estimated body mass index is 29.83 kg/m  as calculated from the following:    Height as of this encounter: 1.753 m (5' 9\").    Weight as of this encounter: 91.6 kg (202 lb).         reports that he has never smoked. He has never used smokeless tobacco.      Counseling Resources:  ATP IV Guidelines  Pooled Cohorts Equation Calculator  FRAX Risk Assessment  ICSI Preventive Guidelines  Dietary Guidelines for Americans, 2010  USDA's MyPlate  ASA Prophylaxis  Lung CA Screening    Savana Bhakta, JESICA [facility] SAVAGE  "

## 2020-07-16 NOTE — LETTER
"2020       RE: Agapito Yu  [address] [address]     Dear Colleague,    Thank you for referring your patient, Agapito Yu, to the [facility] EAR NOSE AND THROAT at [facility]. Please see a copy of my visit note below.    Agapito Yu is a 34 year old male who is being evaluated via a billable telephone visit.      The patient has been notified of following:     \"This telephone visit will be conducted via a call between you and your physician/provider. We have found that certain health care needs can be provided without the need for a physical exam.  This service lets us provide the care you need with a short phone conversation.  If a prescription is necessary we can send it directly to your pharmacy.  If lab work is needed we can place an order for that and you can then stop by our lab to have the test done at a later time.    Telephone visits are billed at different rates depending on your insurance coverage. During this emergency period, for some insurers they may be billed the same as an in-person visit.  Please reach out to your insurance provider with any questions.    If during the course of the call the physician/provider feels a telephone visit is not appropriate, you will not be charged for this service.\"     Patient has given verbal consent for Telephone visit?  Yes  Agapito Yu is a 34 year old male who is being evaluated via a billable telephone visit.        Phone call duration: 25 minutes    Sapna Martin MD        SLEEP SURGERY CONSULTATION    Patient: Agapito Yu  : 1985  CHIEF COMPLAINT: YVONNE    IDENTIFICATION:Patient consulted Dr. Martin for surgical evaluation and possible treatment of obstructive sleep apnea syndrome for Agapito Yu.      HPI:  Agapito Yu is a 34 year old year old male who has Obstructive Sleep Apnea.  Patient had a sleep study performed on 2019.  Overall AHI was 20.6.  He had 86 obstructive " apneas, 48 central events, and 24 hypopneas.  Central events were approximately 30%.  Supine AHI was 52.  Lowest oxygen saturation was 87%.  Patient reports that he obtained sleep study due to concerns over loud snoring.  Waking up with dry throat and mouth.  He is also feeling that he was extremely groggy in the morning and had a difficult time waking up.  He does feel like the grogginess and unrefreshing sleep have worsened over time.  Patient was recommended to try CPAP or an oral appliance but he has not done either.  He has concerns about his ability to tolerate either CPAP or oral appliance.  He has 2 older brothers as well as his father and mother who are on CPAP.  None of them have tolerated the CPAP well.  His 2 brothers is also tried an oral appliance and they did not tolerate it well so he feels that he is unlikely to tolerate that due to the family history.  He is interested in inspire.  Patient does not feel like he has any issues with insomnia.  He is a moderately heavy sleeper.     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     Hyperlipidemia LDL goal <130      Major depressive disorder, recurrent episode, mild (H) 12/8/2016     Mild depression (H)      Nonischemic cardiomyopathy (H)     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 at age 12 - chang's - Dr Hernandez - EF = 45%       PAST SURGICAL HISTORY:  Past Surgical History:   Procedure Laterality Date     DENTAL SURGERY  2001    wisdom teeth     SURGICAL HISTORY OF -   1997    Rt Hip/Ischium/Testicle removal - Chang's       MEDICATIONS:  Current Outpatient Medications   Medication Sig Dispense Refill     cyclobenzaprine (FLEXERIL) 10 MG tablet Take 0.5-1 tablets (5-10 mg) by mouth 3 times daily as needed for muscle spasms 20 tablet 1     losartan (COZAAR) 25 MG tablet Take 1 tablet (25 mg) by mouth daily 90 tablet 3     metoprolol succinate ER (TOPROL-XL) 25 MG 24 hr tablet Take 1 tablet (25 mg) by  mouth daily 90 tablet 3     rosuvastatin (CRESTOR) 20 MG tablet TAKE ONE TABLET BY MOUTH EVERY DAY 90 tablet 0     VIAGRA 50 MG cap/tab TK SS TO ONE T PO PRN - ONE HOUR BEFORE SEXUAL ACTIVITY  0     methylPREDNISolone (MEDROL DOSEPAK) 4 MG tablet therapy pack Follow Package Directions 21 tablet 0     Multiple Vitamins-Minerals (MULTIVITAMIN ADULT PO)        sertraline (ZOLOFT) 50 MG tablet Take 1 tablet (50 mg) by mouth daily for 7 days, THEN 2 tablets (100 mg) daily for 7 days, THEN 3 tablets (150 mg) daily for 16 days. 69 tablet 0     zolpidem (AMBIEN) 5 MG tablet Take tablet by mouth 15 minutes prior to sleep, for Sleep Study 1 tablet 0       ALLERGIES:  No Known Allergies    SOCIAL HISTORY:  Social History     Socioeconomic History     Marital status:      Spouse name: Toshia     Number of children: 0     Years of education: Not on file     Highest education level: Not on file   Occupational History     Not on file   Social Needs     Financial resource strain: Not on file     Food insecurity     Worry: Not on file     Inability: Not on file     Transportation needs     Medical: Not on file     Non-medical: Not on file   Tobacco Use     Smoking status: Never Smoker     Smokeless tobacco: Never Used   Substance and Sexual Activity     Alcohol use: Yes     Alcohol/week: 12.0 - 18.0 standard drinks     Comment: 12-18 drinks per week      Drug use: Yes     Types: Marijuana     Comment: marvel      Sexual activity: Yes     Partners: Female     Birth control/protection: None   Lifestyle     Physical activity     Days per week: Not on file     Minutes per session: Not on file     Stress: Not on file   Relationships     Social connections     Talks on phone: Not on file     Gets together: Not on file     Attends Alevism service: Not on file     Active member of club or organization: Not on file     Attends meetings of clubs or organizations: Not on file     Relationship status: Not on file     Intimate partner  "violence     Fear of current or ex partner: Not on file     Emotionally abused: Not on file     Physically abused: Not on file     Forced sexual activity: Not on file   Other Topics Concern     Parent/sibling w/ CABG, MI or angioplasty before 65F 55M? No   Social History Narrative     Not on file       FAMILY HISTORY:  Family History   Problem Relation Age of Onset     Hyperlipidemia Father      Hyperlipidemia Brother      Colon Cancer Paternal Grandmother        REVIEW OF SYSTEMS:  No flowsheet data found.      PHYSICAL EXAM  Ht 1.753 m (5' 9\")   Wt 88.5 kg (195 lb)   BMI 28.80 kg/m          ASSESSMENT:  1.  Moderate obstructive sleep apnea,     PLAN:  1. I discussed with the patient held upper airway stimulation therapy works. We reviewed expected outcomes as well as MRI incompatibility restrictions at this time.  We discussed what is involved in the surgery as well as as expected recovery.  Discussed with the patient the risk of nonresponse rate as well as potential discomfort from the device itself while stimulating the tongue. We then reviewed the selection criteria.    At this time I would recommend that he have a adequate CPAP trial before considering surgery of any kind.  I have made a referral for him to reestablish care in the sleep medicine clinic to do a CPAP trial.  If after a couple months he finds that the CPAP is not working for him or he is having a difficult time tolerating it, then we would next proceed with a drug-induced sleep endoscopy.      Sapna Martin MD      "

## 2020-07-16 NOTE — PATIENT INSTRUCTIONS
Preventive Health Recommendations  Male Ages 26 - 39    Yearly exam:             See your health care provider every year in order to  o   Review health changes.   o   Discuss preventive care.    o   Review your medicines if your doctor has prescribed any.    You should be tested each year for STDs (sexually transmitted diseases), if you re at risk.     After age 35, talk to your provider about cholesterol testing. If you are at risk for heart disease, have your cholesterol tested at least every 5 years.     If you are at risk for diabetes, you should have a diabetes test (fasting glucose).  Shots: Get a flu shot each year. Get a tetanus shot every 10 years.     Nutrition:    Eat at least 5 servings of fruits and vegetables daily.     Eat whole-grain bread, whole-wheat pasta and brown rice instead of white grains and rice.     Get adequate Calcium and Vitamin D.     Lifestyle    Exercise for at least 150 minutes a week (30 minutes a day, 5 days a week). This will help you control your weight and prevent disease.     Limit alcohol to one drink per day.     No smoking.     Wear sunscreen to prevent skin cancer.     See your dentist every six months for an exam and cleaning.     Patient Education     Iliotibial Band Stretch (Flexibility)    1. Stand next to a chair. Hold onto the chair with your right hand for support. Cross your right leg behind your left leg.  2. Lean your right hip toward the right. Feel the stretch at the outside of your hip.  3. Hold for 30 to 60 seconds. Then relax.  4. Repeat 2 times, or as instructed.  5. Switch sides and repeat.  6. Do this 3 times a day, or as instructed.     Tip: Don t bend forward or twist at the waist.   Date Last Reviewed: 3/29/2016    0299-3766 [employer]. [address] [place] [address]. All rights reserved. This information is not intended as a substitute for professional medical care. Always follow your healthcare professional's  instructions.           Patient Education     Treatment for Iliotibial Band Syndrome  Iliotibial band syndrome, or IT band syndrome, is a condition that causes pain on the outside of the knee. It most often occurs in athletes, especially long-distance runners. It can happen if you cycle, ski, row, or play soccer. It can also occur in people who are starting to exercise.  Types of treatment  Treatment may include:    Avoiding any activity that makes your knee pain worse for a while (like running), and returning to this activity slowly over time    Icing the outside of your knee when it causes you pain    Taking over-the-counter pain medicines    Having corticosteroid injections, to reduce inflammation    Making changes to your activity, like lowering your bicycle seat for cycling or improving your running form    Practicing special exercises to stretch and strengthen the muscles around your hip and your knee  You may find it helpful to work with a physical therapist.  These treatments help most people with IT band syndrome. Your doctor may advise surgery if you still have severe symptoms after 6 months or more of other treatment. Your doctor will talk with you about the types of surgery.  Preventing IT band syndrome  You may be able to prevent IT band syndrome if you:    Run on even surfaces    Replace your running shoes often    Ease up on your training    On a track, make sure you run in both directions    Have an expert check your stance for running and other sporting activities    Stretch your outer thigh and hamstrings often  If you are new to exercise, start slowly. Increase your activity over time.  Talk with your doctor or  for more advice.  When to call the healthcare provider  Call your healthcare provider right away if you have any of these:    Symptoms that get worse, or don t get better with treatment    New symptoms  Date Last Reviewed: 5/1/2018 2000-2019 [employer] [address]  [place] [address]. All rights reserved. This information is not intended as a substitute for professional medical care. Always follow your healthcare professional's instructions.

## 2020-07-16 NOTE — PROGRESS NOTES
"Agapito uY is a 34 year old male who is being evaluated via a billable telephone visit.      The patient has been notified of following:     \"This telephone visit will be conducted via a call between you and your physician/provider. We have found that certain health care needs can be provided without the need for a physical exam.  This service lets us provide the care you need with a short phone conversation.  If a prescription is necessary we can send it directly to your pharmacy.  If lab work is needed we can place an order for that and you can then stop by our lab to have the test done at a later time.    Telephone visits are billed at different rates depending on your insurance coverage. During this emergency period, for some insurers they may be billed the same as an in-person visit.  Please reach out to your insurance provider with any questions.    If during the course of the call the physician/provider feels a telephone visit is not appropriate, you will not be charged for this service.\"     Patient has given verbal consent for Telephone visit?  Yes    What phone number would you like to be contacted at? [phone]    How would you like to obtain your AVS? [employer]    Phone call duration: 25 minutes    Sapna Martin MD        SLEEP SURGERY CONSULTATION    Patient: Agapito Yu  : 1985  CHIEF COMPLAINT: YVONNE    IDENTIFICATION:Patient consulted Dr. Martin for surgical evaluation and possible treatment of obstructive sleep apnea syndrome for Agapito Yu.      HPI:  Agapito Yu is a 34 year old year old male who has Obstructive Sleep Apnea.  Patient had a sleep study performed on 2019.  Overall AHI was 20.6.  He had 86 obstructive apneas, 48 central events, and 24 hypopneas.  Central events were approximately 30%.  Supine AHI was 52.  Lowest oxygen saturation was 87%.  Patient reports that he obtained sleep study due to concerns over loud snoring.  Waking up with dry throat and mouth.  He " is also feeling that he was extremely groggy in the morning and had a difficult time waking up.  He does feel like the grogginess and unrefreshing sleep have worsened over time.  Patient was recommended to try CPAP or an oral appliance but he has not done either.  He has concerns about his ability to tolerate either CPAP or oral appliance.  He has 2 older brothers as well as his father and mother who are on CPAP.  None of them have tolerated the CPAP well.  His 2 brothers is also tried an oral appliance and they did not tolerate it well so he feels that he is unlikely to tolerate that due to the family history.  He is interested in inspKelkoo.  Patient does not feel like he has any issues with insomnia.  He is a moderately heavy sleeper.     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     Hyperlipidemia LDL goal <130      Major depressive disorder, recurrent episode, mild (H) 12/8/2016     Mild depression (H)      Nonischemic cardiomyopathy (H)     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 at age 12 - chang's - Dr Hernandez - EF = 45%       PAST SURGICAL HISTORY:  Past Surgical History:   Procedure Laterality Date     DENTAL SURGERY  2001    wisdom teeth     SURGICAL HISTORY OF -   1997    Rt Hip/Ischium/Testicle removal - Chang's       MEDICATIONS:  Current Outpatient Medications   Medication Sig Dispense Refill     cyclobenzaprine (FLEXERIL) 10 MG tablet Take 0.5-1 tablets (5-10 mg) by mouth 3 times daily as needed for muscle spasms 20 tablet 1     losartan (COZAAR) 25 MG tablet Take 1 tablet (25 mg) by mouth daily 90 tablet 3     metoprolol succinate ER (TOPROL-XL) 25 MG 24 hr tablet Take 1 tablet (25 mg) by mouth daily 90 tablet 3     rosuvastatin (CRESTOR) 20 MG tablet TAKE ONE TABLET BY MOUTH EVERY DAY 90 tablet 0     VIAGRA 50 MG cap/tab TK SS TO ONE T PO PRN - ONE HOUR BEFORE SEXUAL ACTIVITY  0     methylPREDNISolone (MEDROL DOSEPAK) 4 MG tablet therapy pack Follow  Package Directions 21 tablet 0     Multiple Vitamins-Minerals (MULTIVITAMIN ADULT PO)        sertraline (ZOLOFT) 50 MG tablet Take 1 tablet (50 mg) by mouth daily for 7 days, THEN 2 tablets (100 mg) daily for 7 days, THEN 3 tablets (150 mg) daily for 16 days. 69 tablet 0     zolpidem (AMBIEN) 5 MG tablet Take tablet by mouth 15 minutes prior to sleep, for Sleep Study 1 tablet 0       ALLERGIES:  No Known Allergies    SOCIAL HISTORY:  Social History     Socioeconomic History     Marital status:      Spouse name: Toshia     Number of children: 0     Years of education: Not on file     Highest education level: Not on file   Occupational History     Not on file   Social Needs     Financial resource strain: Not on file     Food insecurity     Worry: Not on file     Inability: Not on file     Transportation needs     Medical: Not on file     Non-medical: Not on file   Tobacco Use     Smoking status: Never Smoker     Smokeless tobacco: Never Used   Substance and Sexual Activity     Alcohol use: Yes     Alcohol/week: 12.0 - 18.0 standard drinks     Comment: 12-18 drinks per week      Drug use: Yes     Types: Marijuana     Comment: marvel      Sexual activity: Yes     Partners: Female     Birth control/protection: None   Lifestyle     Physical activity     Days per week: Not on file     Minutes per session: Not on file     Stress: Not on file   Relationships     Social connections     Talks on phone: Not on file     Gets together: Not on file     Attends Baptism service: Not on file     Active member of club or organization: Not on file     Attends meetings of clubs or organizations: Not on file     Relationship status: Not on file     Intimate partner violence     Fear of current or ex partner: Not on file     Emotionally abused: Not on file     Physically abused: Not on file     Forced sexual activity: Not on file   Other Topics Concern     Parent/sibling w/ CABG, MI or angioplasty before 65F 55M? No   Social  "History Narrative     Not on file       FAMILY HISTORY:  Family History   Problem Relation Age of Onset     Hyperlipidemia Father      Hyperlipidemia Brother      Colon Cancer Paternal Grandmother        REVIEW OF SYSTEMS:  No flowsheet data found.      PHYSICAL EXAM  Ht 1.753 m (5' 9\")   Wt 88.5 kg (195 lb)   BMI 28.80 kg/m          ASSESSMENT:  1.  Moderate obstructive sleep apnea,     PLAN:  1. I discussed with the patient held upper airway stimulation therapy works. We reviewed expected outcomes as well as MRI incompatibility restrictions at this time.  We discussed what is involved in the surgery as well as as expected recovery.  Discussed with the patient the risk of nonresponse rate as well as potential discomfort from the device itself while stimulating the tongue. We then reviewed the selection criteria.    At this time I would recommend that he have a adequate CPAP trial before considering surgery of any kind.  I have made a referral for him to reestablish care in the sleep medicine clinic to do a CPAP trial.  If after a couple months he finds that the CPAP is not working for him or he is having a difficult time tolerating it, then we would next proceed with a drug-induced sleep endoscopy.                  "

## 2020-07-17 ASSESSMENT — ANXIETY QUESTIONNAIRES: GAD7 TOTAL SCORE: 1

## 2020-07-21 NOTE — PROGRESS NOTES
"Agapito Yu is a 34 year old male who is being evaluated via a billable telephone visit.      The patient has been notified of following:     \"This telephone visit will be conducted via a call between you and your physician/provider. We have found that certain health care needs can be provided without the need for a physical exam.  This service lets us provide the care you need with a short phone conversation.  If a prescription is necessary we can send it directly to your pharmacy.  If lab work is needed we can place an order for that and you can then stop by our lab to have the test done at a later time.    Telephone visits are billed at different rates depending on your insurance coverage. During this emergency period, for some insurers they may be billed the same as an in-person visit.  Please reach out to your insurance provider with any questions.    If during the course of the call the physician/provider feels a telephone visit is not appropriate, you will not be charged for this service.\"    Patient has given verbal consent for Telephone visit?  Yes    What phone number would you like to be contacted at? [phone]    How would you like to obtain your AVS? [employer]    Phone call duration: 8 minutes    Soto Oh MD      Chief complaint: sleep apnea    Presenting History:     Patient follows up in clinic for management of sleep apnea.     Previous sleep evaluation was performed in 2019. PSG on 7/24/2019 showed an AHI of 20.6 with supine AHI of 52.4 and non-supine AHI of 2.6. Patient had initially opted for dental appliance therapy but did not pursue a consultation.     In the interim, snoring is louder and his wife continues to witness apneas. He sleep son his back and sides but thinks that majority of the time he is a side sleeper. He feels tied during the day.    He recently consulted Dr. Martin, ENT sleep for consideration of Inspire therapy. A CPAP trial was advised before surgical options are " explored.     Diagnosis:     Obstructive sleep apnea, moderate, supine positional    - We reviewed findings from his sleep study again. Moderate sleep apnea, consequences of untreated disease and management options were reviewed. Patient wants to start CPAP therapy.     Plan:     1. Start auto PAP therapy   2. Follow up in 2 months     I spent a total of 7 minutes with patient with more than 50% in counseling     Krista Topete

## 2020-07-22 ENCOUNTER — VIRTUAL VISIT (OUTPATIENT)
Dept: SLEEP MEDICINE | Facility: CLINIC | Age: 35
End: 2020-07-22
Attending: OTOLARYNGOLOGY
Payer: COMMERCIAL

## 2020-07-22 DIAGNOSIS — G47.33 OSA (OBSTRUCTIVE SLEEP APNEA): Primary | ICD-10-CM

## 2020-07-22 PROCEDURE — 99212 OFFICE O/P EST SF 10 MIN: CPT | Mod: 95 | Performed by: INTERNAL MEDICINE

## 2020-07-22 NOTE — PATIENT INSTRUCTIONS
1. Moderate Obstructive sleep apnea    - Start CPAP therapy. [facility] ([phone]) will be point of contact about getting the CPAP device   - Follow up with me in 2 months

## 2020-08-05 NOTE — NURSING NOTE
Pt called and message left informing pt that his MyChart message has been sent to our STM team as well as Dr. Oh.     Pt was called to schedule 2 month follow up as well.     JAMIL Moreno

## 2020-08-13 ENCOUNTER — TELEPHONE (OUTPATIENT)
Dept: SLEEP MEDICINE | Facility: CLINIC | Age: 35
End: 2020-08-13

## 2020-08-14 ENCOUNTER — MYC MEDICAL ADVICE (OUTPATIENT)
Dept: SLEEP MEDICINE | Facility: CLINIC | Age: 35
End: 2020-08-14

## 2020-08-17 DIAGNOSIS — E78.5 HYPERLIPIDEMIA LDL GOAL <130: ICD-10-CM

## 2020-08-17 DIAGNOSIS — I50.22 CHRONIC SYSTOLIC CONGESTIVE HEART FAILURE (H): ICD-10-CM

## 2020-08-17 DIAGNOSIS — C41.9 EWING'S SARCOMA (H): ICD-10-CM

## 2020-08-17 LAB
ERYTHROCYTE [DISTWIDTH] IN BLOOD BY AUTOMATED COUNT: 12.6 % (ref 10–15)
HCT VFR BLD AUTO: 39.1 % (ref 40–53)
HGB BLD-MCNC: 13.3 G/DL (ref 13.3–17.7)
MCH RBC QN AUTO: 32.1 PG (ref 26.5–33)
MCHC RBC AUTO-ENTMCNC: 34 G/DL (ref 31.5–36.5)
MCV RBC AUTO: 94 FL (ref 78–100)
PLATELET # BLD AUTO: 212 10E9/L (ref 150–450)
RBC # BLD AUTO: 4.14 10E12/L (ref 4.4–5.9)
WBC # BLD AUTO: 5 10E9/L (ref 4–11)

## 2020-08-17 PROCEDURE — 80061 LIPID PANEL: CPT | Performed by: NURSE PRACTITIONER

## 2020-08-17 PROCEDURE — 36415 COLL VENOUS BLD VENIPUNCTURE: CPT | Performed by: NURSE PRACTITIONER

## 2020-08-17 PROCEDURE — 83721 ASSAY OF BLOOD LIPOPROTEIN: CPT | Mod: 59 | Performed by: NURSE PRACTITIONER

## 2020-08-17 PROCEDURE — 84270 ASSAY OF SEX HORMONE GLOBUL: CPT | Performed by: NURSE PRACTITIONER

## 2020-08-17 PROCEDURE — 85027 COMPLETE CBC AUTOMATED: CPT | Performed by: NURSE PRACTITIONER

## 2020-08-17 PROCEDURE — 80048 BASIC METABOLIC PNL TOTAL CA: CPT | Performed by: NURSE PRACTITIONER

## 2020-08-17 PROCEDURE — 84403 ASSAY OF TOTAL TESTOSTERONE: CPT | Performed by: NURSE PRACTITIONER

## 2020-08-17 PROCEDURE — 84460 ALANINE AMINO (ALT) (SGPT): CPT | Performed by: NURSE PRACTITIONER

## 2020-08-18 DIAGNOSIS — E78.2 MIXED HYPERLIPIDEMIA: Primary | ICD-10-CM

## 2020-08-18 LAB
ALT SERPL W P-5'-P-CCNC: 47 U/L (ref 0–70)
ANION GAP SERPL CALCULATED.3IONS-SCNC: 12 MMOL/L (ref 3–14)
BUN SERPL-MCNC: 16 MG/DL (ref 7–30)
CALCIUM SERPL-MCNC: 9.1 MG/DL (ref 8.5–10.1)
CHLORIDE SERPL-SCNC: 106 MMOL/L (ref 94–109)
CHOLEST SERPL-MCNC: 237 MG/DL
CO2 SERPL-SCNC: 19 MMOL/L (ref 20–32)
CREAT SERPL-MCNC: 0.96 MG/DL (ref 0.66–1.25)
GFR SERPL CREATININE-BSD FRML MDRD: >90 ML/MIN/{1.73_M2}
GLUCOSE SERPL-MCNC: 99 MG/DL (ref 70–99)
HDLC SERPL-MCNC: 52 MG/DL
LDLC SERPL CALC-MCNC: ABNORMAL MG/DL
LDLC SERPL DIRECT ASSAY-MCNC: 145 MG/DL
NONHDLC SERPL-MCNC: 185 MG/DL
POTASSIUM SERPL-SCNC: 4.3 MMOL/L (ref 3.4–5.3)
SODIUM SERPL-SCNC: 137 MMOL/L (ref 133–144)
TRIGL SERPL-MCNC: 420 MG/DL

## 2020-08-19 DIAGNOSIS — Z78.9 INTOLERANCE OF CONTINUOUS POSITIVE AIRWAY PRESSURE (CPAP) VENTILATION: Primary | ICD-10-CM

## 2020-08-19 LAB
SHBG SERPL-SCNC: 16 NMOL/L (ref 11–80)
TESTOST FREE SERPL-MCNC: 9.71 NG/DL (ref 4.7–24.4)
TESTOST SERPL-MCNC: 344 NG/DL (ref 240–950)

## 2020-08-19 RX ORDER — ZOLPIDEM TARTRATE 6.25 MG/1
6.25 TABLET, FILM COATED, EXTENDED RELEASE ORAL
Qty: 30 TABLET | Refills: 0 | Status: SHIPPED | OUTPATIENT
Start: 2020-08-19 | End: 2021-01-28

## 2020-08-20 DIAGNOSIS — E78.5 HYPERLIPIDEMIA LDL GOAL <130: ICD-10-CM

## 2020-08-20 RX ORDER — ROSUVASTATIN CALCIUM 20 MG/1
TABLET, COATED ORAL
Qty: 90 TABLET | Refills: 0 | OUTPATIENT
Start: 2020-08-20

## 2020-08-27 ENCOUNTER — TELEPHONE (OUTPATIENT)
Dept: SLEEP MEDICINE | Facility: CLINIC | Age: 35
End: 2020-08-27

## 2020-08-27 DIAGNOSIS — G47.33 OSA (OBSTRUCTIVE SLEEP APNEA): ICD-10-CM

## 2020-09-21 NOTE — PROGRESS NOTES
"Agapito Yu is a 34 year old male who is being evaluated via a billable telephone visit.      The patient has been notified of following:     \"This telephone visit will be conducted via a call between you and your physician/provider. We have found that certain health care needs can be provided without the need for a physical exam.  This service lets us provide the care you need with a short phone conversation.  If a prescription is necessary we can send it directly to your pharmacy.  If lab work is needed we can place an order for that and you can then stop by our lab to have the test done at a later time.    Telephone visits are billed at different rates depending on your insurance coverage. During this emergency period, for some insurers they may be billed the same as an in-person visit.  Please reach out to your insurance provider with any questions.    If during the course of the call the physician/provider feels a telephone visit is not appropriate, you will not be charged for this service.\"    Patient has given verbal consent for Telephone visit?  Yes    What phone number would you like to be contacted at? [phone]    How would you like to obtain your AVS? MyChart    Phone call duration: 6 minutes    Soto Oh MD, MD      Obstructive Sleep Apnea - PAP Follow-Up Visit:    Chief Complaint   Patient presents with     RECHECK     WOULD LIKE TO ORAL APPLIANCE        Agapito Yu comes in today for follow-up of their moderate sleep apnea, managed with CPAP.     PSG on 7/24/2019 showed an AHI of 20.6 with supine AHI of 52.4 and non-supine AHI of 2.6.    Patient was prescribed auto PAP therapy. However, he has not tolerated treatment. He reports being unable to maintain sleep with CPAP. He could not sleep in his usual sleep position on his stomach.     He was provided Zolpidem for a month and had coaching from [facility], which did not help with CPAP tolerance.     [facility]     Auto-PAP 5-15 cmH2O download:  9/30 " total days of use. 21 nonuse days. 0% days with >4 hours use.  Average use 2 hours 20 minutes per day. Median Leak 5.5 L/min. 95%ile Leak 9.5 L/min. CPAP 95% pressure 9.8cm. AHI 0.9      Reviewed by team: Tobacco  Allergies  Meds       Reviewed by provider:        Problem List:  Patient Active Problem List    Diagnosis Date Noted     YVONNE (obstructive sleep apnea) 07/28/2019     Priority: Medium     Nonischemic cardiomyopathy (H)      Priority: Medium     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 at age 12 - [facility] - Dr Hernandez - EF = 45%       Parker's sarcoma (H)      Priority: Medium     Hyperlipidemia LDL goal <130      Priority: Medium     Major depressive disorder, recurrent episode, mild (H) 12/08/2016     Priority: Medium     Familial hypercholesterolemia 05/08/2014     Priority: Medium     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  In remission.             There were no vitals taken for this visit.    Impression/Plan:     Moderate sleep apnea.     - Intolerant of CPAP. Patient wants to discontinue CPAP. We reviewed alternate therapy options. He wants to pursue dental appliance.     Plan:     1. Referral to dental sleep medicine      Six  minutes spent with patient, all of which were spent  counseling, consulting, coordinating plan of care.      Soto Oh MD    CC:  Mark Riojas,

## 2020-09-22 ENCOUNTER — VIRTUAL VISIT (OUTPATIENT)
Dept: SLEEP MEDICINE | Facility: CLINIC | Age: 35
End: 2020-09-22
Payer: COMMERCIAL

## 2020-09-22 DIAGNOSIS — G47.33 OSA (OBSTRUCTIVE SLEEP APNEA): Primary | ICD-10-CM

## 2020-09-22 PROCEDURE — 99211 OFF/OP EST MAY X REQ PHY/QHP: CPT | Mod: 95 | Performed by: INTERNAL MEDICINE

## 2020-09-22 NOTE — PATIENT INSTRUCTIONS
Your There is no height or weight on file to calculate BMI.  Weight management is a personal decision.  If you are interested in exploring weight loss strategies, the following discussion covers the approaches that may be successful. Body mass index (BMI) is one way to tell whether you are at a healthy weight, overweight, or obese. It measures your weight in relation to your height.  A BMI of 18.5 to 24.9 is in the healthy range. A person with a BMI of 25 to 29.9 is considered overweight, and someone with a BMI of 30 or greater is considered obese. More than two-thirds of American adults are considered overweight or obese.  Being overweight or obese increases the risk for further weight gain. Excess weight may lead to heart disease and diabetes.  Creating and following plans for healthy eating and physical activity may help you improve your health.  Weight control is part of healthy lifestyle and includes exercise, emotional health, and healthy eating habits. Careful eating habits lifelong are the mainstay of weight control. Though there are significant health benefits from weight loss, long-term weight loss with diet alone may be very difficult to achieve- studies show long-term success with dietary management in less than 10% of people. Attaining a healthy weight may be especially difficult to achieve in those with severe obesity. In some cases, medications, devices and surgical management might be considered.  What can you do?  If you are overweight or obese and are interested in methods for weight loss, you should discuss this with your provider.     Consider reducing daily calorie intake by 500 calories.     Keep a food journal.     Avoiding skipping meals, consider cutting portions instead.    Diet combined with exercise helps maintain muscle while optimizing fat loss. Strength training is particularly important for building and maintaining muscle mass. Exercise helps reduce stress, increase energy, and  improves fitness. Increasing exercise without diet control, however, may not burn enough calories to loose weight.       Start walking three days a week 10-20 minutes at a time    Work towards walking thirty minutes five days a week     Eventually, increase the speed of your walking for 1-2 minutes at time    In addition, we recommend that you review healthy lifestyles and methods for weight loss available through the National Institutes of Health patient information sites:  http://win.niddk.nih.gov/publications/index.htm    And look into health and wellness programs that may be available through your health insurance provider, employer, local community center, or bebe club.

## 2020-11-09 ENCOUNTER — E-VISIT (OUTPATIENT)
Dept: FAMILY MEDICINE | Facility: CLINIC | Age: 35
End: 2020-11-09
Payer: COMMERCIAL

## 2020-11-09 DIAGNOSIS — F33.0 MAJOR DEPRESSIVE DISORDER, RECURRENT EPISODE, MILD (H): ICD-10-CM

## 2020-11-09 PROCEDURE — 99421 OL DIG E/M SVC 5-10 MIN: CPT | Performed by: FAMILY MEDICINE

## 2020-11-09 ASSESSMENT — ANXIETY QUESTIONNAIRES
2. NOT BEING ABLE TO STOP OR CONTROL WORRYING: MORE THAN HALF THE DAYS
1. FEELING NERVOUS, ANXIOUS, OR ON EDGE: MORE THAN HALF THE DAYS
GAD7 TOTAL SCORE: 10
6. BECOMING EASILY ANNOYED OR IRRITABLE: NEARLY EVERY DAY
7. FEELING AFRAID AS IF SOMETHING AWFUL MIGHT HAPPEN: NOT AT ALL
5. BEING SO RESTLESS THAT IT IS HARD TO SIT STILL: SEVERAL DAYS
4. TROUBLE RELAXING: MORE THAN HALF THE DAYS
7. FEELING AFRAID AS IF SOMETHING AWFUL MIGHT HAPPEN: NOT AT ALL
GAD7 TOTAL SCORE: 10
GAD7 TOTAL SCORE: 10
3. WORRYING TOO MUCH ABOUT DIFFERENT THINGS: NOT AT ALL

## 2020-11-09 ASSESSMENT — PATIENT HEALTH QUESTIONNAIRE - PHQ9
10. IF YOU CHECKED OFF ANY PROBLEMS, HOW DIFFICULT HAVE THESE PROBLEMS MADE IT FOR YOU TO DO YOUR WORK, TAKE CARE OF THINGS AT HOME, OR GET ALONG WITH OTHER PEOPLE: SOMEWHAT DIFFICULT
SUM OF ALL RESPONSES TO PHQ QUESTIONS 1-9: 15
SUM OF ALL RESPONSES TO PHQ QUESTIONS 1-9: 15

## 2020-11-10 ASSESSMENT — ANXIETY QUESTIONNAIRES: GAD7 TOTAL SCORE: 10

## 2020-11-10 ASSESSMENT — PATIENT HEALTH QUESTIONNAIRE - PHQ9: SUM OF ALL RESPONSES TO PHQ QUESTIONS 1-9: 15

## 2020-11-10 NOTE — TELEPHONE ENCOUNTER
Provider E-Visit time total (minutes): 7    PHQ 11/26/2019 7/16/2020 11/9/2020   PHQ-9 Total Score 12 2 15   Q9: Thoughts of better off dead/self-harm past 2 weeks Not at all Not at all More than half the days   F/U: Thoughts of suicide or self-harm - - Yes   F/U: Self harm-plan - - No   F/U: Self-harm action - - No   F/U: Safety concerns - - No     FABIAN-7 SCORE 11/26/2019 7/16/2020 11/9/2020   Total Score 13 (moderate anxiety) - 10 (moderate anxiety)   Total Score 13 1 10       Mark Riojas DO  11/10/2020 9:55 AM

## 2020-11-23 DIAGNOSIS — E78.5 HYPERLIPIDEMIA LDL GOAL <130: ICD-10-CM

## 2020-11-24 ENCOUNTER — MYC MEDICAL ADVICE (OUTPATIENT)
Dept: FAMILY MEDICINE | Facility: CLINIC | Age: 35
End: 2020-11-24

## 2020-11-24 DIAGNOSIS — N52.9 ERECTILE DYSFUNCTION, UNSPECIFIED ERECTILE DYSFUNCTION TYPE: Primary | ICD-10-CM

## 2020-11-24 RX ORDER — ROSUVASTATIN CALCIUM 20 MG/1
20 TABLET, COATED ORAL DAILY
Qty: 90 TABLET | Refills: 2 | Status: SHIPPED | OUTPATIENT
Start: 2020-11-24 | End: 2021-12-28

## 2020-11-30 DIAGNOSIS — F33.0 MAJOR DEPRESSIVE DISORDER, RECURRENT EPISODE, MILD (H): ICD-10-CM

## 2020-11-30 NOTE — TELEPHONE ENCOUNTER
Routing refill request to provider for review/approval because:  PHQ 9 elevated      PHQ 11/26/2019 7/16/2020 11/9/2020   PHQ-9 Total Score 12 2 15   Q9: Thoughts of better off dead/self-harm past 2 weeks Not at all Not at all More than half the days   F/U: Thoughts of suicide or self-harm - - Yes   F/U: Self harm-plan - - No   F/U: Self-harm action - - No   F/U: Safety concerns - - No

## 2020-12-01 RX ORDER — SERTRALINE HYDROCHLORIDE 100 MG/1
100 TABLET, FILM COATED ORAL DAILY
Qty: 90 TABLET | Refills: 0 | Status: SHIPPED | OUTPATIENT
Start: 2020-12-01 | End: 2021-03-17

## 2020-12-04 NOTE — TELEPHONE ENCOUNTER
Dr. Riojas, please see message chain and advise on options for Agapito.     Thank you,  Shaista Holm R.N.

## 2020-12-06 RX ORDER — SILDENAFIL CITRATE 20 MG/1
TABLET ORAL
Qty: 90 TABLET | Refills: 0 | Status: SHIPPED | OUTPATIENT
Start: 2020-12-06 | End: 2022-10-11

## 2020-12-29 ENCOUNTER — E-VISIT (OUTPATIENT)
Dept: FAMILY MEDICINE | Facility: CLINIC | Age: 35
End: 2020-12-29
Payer: COMMERCIAL

## 2020-12-29 DIAGNOSIS — M54.16 LUMBAR RADICULOPATHY: Primary | ICD-10-CM

## 2020-12-29 PROCEDURE — 99421 OL DIG E/M SVC 5-10 MIN: CPT | Performed by: FAMILY MEDICINE

## 2020-12-29 RX ORDER — METHYLPREDNISOLONE 4 MG
TABLET, DOSE PACK ORAL
Qty: 21 TABLET | Refills: 0 | Status: SHIPPED | OUTPATIENT
Start: 2020-12-29 | End: 2021-01-28

## 2020-12-29 RX ORDER — TRAMADOL HYDROCHLORIDE 50 MG/1
50 TABLET ORAL EVERY 6 HOURS PRN
Qty: 10 TABLET | Refills: 0 | Status: SHIPPED | OUTPATIENT
Start: 2020-12-29 | End: 2021-01-01

## 2021-01-04 ENCOUNTER — MYC MEDICAL ADVICE (OUTPATIENT)
Dept: FAMILY MEDICINE | Facility: CLINIC | Age: 36
End: 2021-01-04

## 2021-01-05 NOTE — TELEPHONE ENCOUNTER
Last office visit: E -visit 12/29/2020    Please see my chart message below     Please review and advise     Thank you     Stephanie Drew RN, BSN  [facility]

## 2021-01-06 ENCOUNTER — MYC MEDICAL ADVICE (OUTPATIENT)
Dept: FAMILY MEDICINE | Facility: CLINIC | Age: 36
End: 2021-01-06

## 2021-01-06 DIAGNOSIS — N52.9 ERECTILE DYSFUNCTION, UNSPECIFIED ERECTILE DYSFUNCTION TYPE: Primary | ICD-10-CM

## 2021-01-06 NOTE — TELEPHONE ENCOUNTER
Last testosterone tests including one in August 2020 were normal. I don't think testing again at this time would be of any benefit. If still having issues, could try an alternative medication or have a consultation with urology.    Mark Riojas,   1/6/2021 7:47 AM

## 2021-01-07 RX ORDER — TADALAFIL 10 MG/1
10 TABLET ORAL DAILY PRN
Qty: 30 TABLET | Refills: 1 | Status: SHIPPED | OUTPATIENT
Start: 2021-01-07 | End: 2021-03-01

## 2021-01-07 NOTE — TELEPHONE ENCOUNTER
Routing to PCP for further review/recommendations/orders.      Sapna Floyd RN  [facility]

## 2021-01-23 ENCOUNTER — MYC MEDICAL ADVICE (OUTPATIENT)
Dept: FAMILY MEDICINE | Facility: CLINIC | Age: 36
End: 2021-01-23

## 2021-01-25 NOTE — TELEPHONE ENCOUNTER
My chart message sent     Awaiting reply - needs to be seen     Stephanie Drew RN, BSN  [facility]

## 2021-01-28 ENCOUNTER — OFFICE VISIT (OUTPATIENT)
Dept: FAMILY MEDICINE | Facility: CLINIC | Age: 36
End: 2021-01-28
Payer: COMMERCIAL

## 2021-01-28 VITALS
OXYGEN SATURATION: 98 % | BODY MASS INDEX: 30.36 KG/M2 | TEMPERATURE: 98.6 F | HEIGHT: 69 IN | HEART RATE: 88 BPM | SYSTOLIC BLOOD PRESSURE: 120 MMHG | DIASTOLIC BLOOD PRESSURE: 82 MMHG | WEIGHT: 205 LBS

## 2021-01-28 DIAGNOSIS — N52.9 ERECTILE DYSFUNCTION, UNSPECIFIED ERECTILE DYSFUNCTION TYPE: ICD-10-CM

## 2021-01-28 DIAGNOSIS — C41.9 EWING'S SARCOMA (H): ICD-10-CM

## 2021-01-28 DIAGNOSIS — F33.0 MAJOR DEPRESSIVE DISORDER, RECURRENT EPISODE, MILD (H): Primary | ICD-10-CM

## 2021-01-28 PROCEDURE — 99213 OFFICE O/P EST LOW 20 MIN: CPT | Performed by: FAMILY MEDICINE

## 2021-01-28 RX ORDER — BUPROPION HYDROCHLORIDE 150 MG/1
150 TABLET ORAL EVERY MORNING
Qty: 90 TABLET | Refills: 0 | Status: SHIPPED | OUTPATIENT
Start: 2021-01-28 | End: 2021-04-22

## 2021-01-28 RX ORDER — SERTRALINE HYDROCHLORIDE 25 MG/1
TABLET, FILM COATED ORAL
Qty: 63 TABLET | Refills: 0 | Status: SHIPPED | OUTPATIENT
Start: 2021-01-28 | End: 2021-04-27

## 2021-01-28 ASSESSMENT — PATIENT HEALTH QUESTIONNAIRE - PHQ9
SUM OF ALL RESPONSES TO PHQ QUESTIONS 1-9: 4
5. POOR APPETITE OR OVEREATING: SEVERAL DAYS

## 2021-01-28 ASSESSMENT — ANXIETY QUESTIONNAIRES
7. FEELING AFRAID AS IF SOMETHING AWFUL MIGHT HAPPEN: NOT AT ALL
3. WORRYING TOO MUCH ABOUT DIFFERENT THINGS: NOT AT ALL
6. BECOMING EASILY ANNOYED OR IRRITABLE: MORE THAN HALF THE DAYS
IF YOU CHECKED OFF ANY PROBLEMS ON THIS QUESTIONNAIRE, HOW DIFFICULT HAVE THESE PROBLEMS MADE IT FOR YOU TO DO YOUR WORK, TAKE CARE OF THINGS AT HOME, OR GET ALONG WITH OTHER PEOPLE: NOT DIFFICULT AT ALL
GAD7 TOTAL SCORE: 5
2. NOT BEING ABLE TO STOP OR CONTROL WORRYING: SEVERAL DAYS
1. FEELING NERVOUS, ANXIOUS, OR ON EDGE: SEVERAL DAYS
5. BEING SO RESTLESS THAT IT IS HARD TO SIT STILL: NOT AT ALL

## 2021-01-28 ASSESSMENT — MIFFLIN-ST. JEOR: SCORE: 1855.25

## 2021-01-28 NOTE — PROGRESS NOTES
"  Assessment & Plan     Major depressive disorder, recurrent episode, mild (H): given effects on libido, will try tapering off Zoloft and starting Wellbutrin. Follow up in 4-6 weeks to see how he is doing.   - buPROPion (WELLBUTRIN XL) 150 MG 24 hr tablet  Dispense: 90 tablet; Refill: 0  - sertraline (ZOLOFT) 25 MG tablet  Dispense: 63 tablet; Refill: 0    Erectile dysfunction, unspecified erectile dysfunction type  - Testosterone, total  - TSH with free T4 reflex    Parker's sarcoma (H): recheck testosterone level  - Testosterone, total  - TSH with free T4 reflex     BMI:   Estimated body mass index is 30.27 kg/m  as calculated from the following:    Height as of this encounter: 1.753 m (5' 9\").    Weight as of this encounter: 93 kg (205 lb).       No follow-ups on file.    Mark Riojas DO  [facility] TALA Altman is a 35 year old who presents to clinic today for the following health issues     HPI       Medication Followup of all     Taking Medication as prescribed: yes    Side Effects:  Yes low sex drive use be 2-3 times per wk prior to meds now has been at least 6 months since last intercourse with wife       Tried the sildenafil but hard to try to take these medications when sexual interest is low.    PHQ 7/16/2020 11/9/2020 1/28/2021   PHQ-9 Total Score 2 15 4   Q9: Thoughts of better off dead/self-harm past 2 weeks Not at all More than half the days Not at all   F/U: Thoughts of suicide or self-harm - Yes -   F/U: Self harm-plan - No -   F/U: Self-harm action - No -   F/U: Safety concerns - No -     FABIAN-7 SCORE 7/16/2020 11/9/2020 1/28/2021   Total Score - 10 (moderate anxiety) -   Total Score 1 10 5       Review of Systems   Constitutional, HEENT, cardiovascular, pulmonary, gi and gu systems are negative, except as otherwise noted.      Objective    /82   Pulse 88   Temp 98.6  F (37  C) (Tympanic)   Ht 1.753 m (5' 9\")   Wt 93 kg (205 lb)   SpO2 98%   BMI " 30.27 kg/m    Body mass index is 30.27 kg/m .  Physical Exam   GENERAL: healthy, alert and no distress  MS: no gross musculoskeletal defects noted, no edema  PSYCH: mentation appears normal and affect normal/bright

## 2021-01-29 ASSESSMENT — ANXIETY QUESTIONNAIRES: GAD7 TOTAL SCORE: 5

## 2021-02-19 DIAGNOSIS — F33.0 MAJOR DEPRESSIVE DISORDER, RECURRENT EPISODE, MILD (H): ICD-10-CM

## 2021-02-22 ENCOUNTER — MYC MEDICAL ADVICE (OUTPATIENT)
Dept: FAMILY MEDICINE | Facility: CLINIC | Age: 36
End: 2021-02-22

## 2021-02-22 RX ORDER — SERTRALINE HYDROCHLORIDE 25 MG/1
TABLET, FILM COATED ORAL
Qty: 63 TABLET | Refills: 0 | Status: CANCELLED | OUTPATIENT
Start: 2021-02-22 | End: 2021-04-05

## 2021-02-24 NOTE — TELEPHONE ENCOUNTER
Pt stated that he no longer takes this medication     Stephanie Drew RN, BSN  [facility]

## 2021-03-01 DIAGNOSIS — N52.9 ERECTILE DYSFUNCTION, UNSPECIFIED ERECTILE DYSFUNCTION TYPE: ICD-10-CM

## 2021-03-01 RX ORDER — TADALAFIL 10 MG/1
TABLET ORAL
Qty: 30 TABLET | Refills: 1 | Status: SHIPPED | OUTPATIENT
Start: 2021-03-01

## 2021-03-20 ENCOUNTER — MYC MEDICAL ADVICE (OUTPATIENT)
Dept: FAMILY MEDICINE | Facility: CLINIC | Age: 36
End: 2021-03-20

## 2021-03-22 NOTE — TELEPHONE ENCOUNTER
My chart message sent     Awaiting reply     Stephanie Drew RN, BSN  [facility]

## 2021-03-22 NOTE — TELEPHONE ENCOUNTER
Noted and updated     Stephanie Drew RN, BSN  [facility]

## 2021-04-19 DIAGNOSIS — F33.0 MAJOR DEPRESSIVE DISORDER, RECURRENT EPISODE, MILD (H): ICD-10-CM

## 2021-04-21 NOTE — TELEPHONE ENCOUNTER
LOV: 1/28/2021  Patient due for med check  No future appt scheduled    Routing to [facility] to assist in scheduling      Sapna Floyd RN  [facility]

## 2021-04-22 RX ORDER — BUPROPION HYDROCHLORIDE 150 MG/1
150 TABLET ORAL EVERY MORNING
Qty: 90 TABLET | Refills: 0 | Status: SHIPPED | OUTPATIENT
Start: 2021-04-22 | End: 2021-08-18

## 2021-04-22 NOTE — TELEPHONE ENCOUNTER
Medication is being filled for 1 time refill only due to:  Patient needs to be seen because due for med check.      Sapna Floyd RN  [facility]

## 2021-04-22 NOTE — TELEPHONE ENCOUNTER
Pt has virtual visit with Dr. Riojas for a med check on 4/27/21.    Routing to  refill.    Izzy Hodges MA

## 2021-04-27 ENCOUNTER — VIRTUAL VISIT (OUTPATIENT)
Dept: FAMILY MEDICINE | Facility: CLINIC | Age: 36
End: 2021-04-27
Payer: COMMERCIAL

## 2021-04-27 DIAGNOSIS — F33.0 MAJOR DEPRESSIVE DISORDER, RECURRENT EPISODE, MILD (H): Primary | ICD-10-CM

## 2021-04-27 PROCEDURE — 96127 BRIEF EMOTIONAL/BEHAV ASSMT: CPT | Mod: 95 | Performed by: FAMILY MEDICINE

## 2021-04-27 PROCEDURE — 99213 OFFICE O/P EST LOW 20 MIN: CPT | Mod: 95 | Performed by: FAMILY MEDICINE

## 2021-04-27 ASSESSMENT — PATIENT HEALTH QUESTIONNAIRE - PHQ9: SUM OF ALL RESPONSES TO PHQ QUESTIONS 1-9: 3

## 2021-04-27 NOTE — PROGRESS NOTES
"Agapito is a 35 year old who is being evaluated via a billable telephone visit.      What phone number would you like to be contacted at? [phone]  How would you like to obtain your AVS? MyChart    Assessment & Plan     Major depressive disorder, recurrent episode, mild (H): doing well with current medication. Continue Wellbutrin 150 mg daily. Follow up in 6 months.        BMI:   Estimated body mass index is 30.27 kg/m  as calculated from the following:    Height as of 1/28/21: 1.753 m (5' 9\").    Weight as of 1/28/21: 93 kg (205 lb).       Depression Screening Follow Up    PHQ 4/27/2021   PHQ-9 Total Score 3   Q9: Thoughts of better off dead/self-harm past 2 weeks Not at all   F/U: Thoughts of suicide or self-harm -   F/U: Self harm-plan -   F/U: Self-harm action -   F/U: Safety concerns -       Return in about 6 months (around 10/27/2021) for follow up mood. .    Mark Riojas DO  [facility]   Agapito is a 35 year old who presents for the following health issues     HPI     Depression Followup    How are you doing with your depression since your last visit? Improved /side effects improved     Are you having other symptoms that might be associated with depression? No    Have you had a significant life event?  No     Are you feeling anxious or having panic attacks?   No    Do you have any concerns with your use of alcohol or other drugs? No    He feels like mood has been improved since changing from sertraline. Not having as significant of side effects as on the sertraline.     Social History     Tobacco Use     Smoking status: Never Smoker     Smokeless tobacco: Never Used   Substance Use Topics     Alcohol use: Yes     Alcohol/week: 12.0 - 18.0 standard drinks     Comment: 12-18 drinks per week      Drug use: Yes     Types: Marijuana     Comment: marajuana      PHQ 1/28/2021 4/27/2021 4/27/2021   PHQ-9 Total Score 4 9 3   Q9: Thoughts of better off dead/self-harm past 2 " weeks Not at all Several days Not at all   F/U: Thoughts of suicide or self-harm - - -   F/U: Self harm-plan - - -   F/U: Self-harm action - - -   F/U: Safety concerns - - -     FABIAN-7 SCORE 7/16/2020 11/9/2020 1/28/2021   Total Score - 10 (moderate anxiety) -   Total Score 1 10 5     Last PHQ-9 4/27/2021   1.  Little interest or pleasure in doing things 1   2.  Feeling down, depressed, or hopeless 0   3.  Trouble falling or staying asleep, or sleeping too much 1   4.  Feeling tired or having little energy 1   5.  Poor appetite or overeating 0   6.  Feeling bad about yourself 0   7.  Trouble concentrating 0   8.  Moving slowly or restless 0   Q9: Thoughts of better off dead/self-harm past 2 weeks 0   PHQ-9 Total Score 3   Difficulty at work, home, or with people -   In the past two weeks have you had thoughts of suicide or self harm? -   Do you have concerns about your personal safety or the safety of others? -   In the past 2 weeks have you thought about a plan or had intention to harm yourself? -   In the past 2 weeks have you acted on these thoughts in any way? -         Review of Systems   Constitutional, HEENT, cardiovascular, pulmonary, gi and gu systems are negative, except as otherwise noted.      Objective           Vitals:  No vitals were obtained today due to virtual visit.    Physical Exam   healthy, alert and no distress  PSYCH: Alert and oriented times 3; coherent speech, normal   rate and volume, able to articulate logical thoughts, able   to abstract reason, no tangential thoughts, no hallucinations   or delusions  His affect is normal  RESP: No cough, no audible wheezing, able to talk in full sentences  Remainder of exam unable to be completed due to telephone visits            Phone call duration: 5 minutes

## 2021-06-21 DIAGNOSIS — I42.9 SECONDARY CARDIOMYOPATHY (H): ICD-10-CM

## 2021-06-21 RX ORDER — LOSARTAN POTASSIUM 25 MG/1
25 TABLET ORAL DAILY
Qty: 90 TABLET | Refills: 0 | Status: SHIPPED | OUTPATIENT
Start: 2021-06-21 | End: 2021-09-27

## 2021-07-24 ENCOUNTER — MYC MEDICAL ADVICE (OUTPATIENT)
Dept: FAMILY MEDICINE | Facility: CLINIC | Age: 36
End: 2021-07-24

## 2021-07-26 DIAGNOSIS — I42.8 NONISCHEMIC CARDIOMYOPATHY (H): ICD-10-CM

## 2021-07-26 RX ORDER — METOPROLOL SUCCINATE 25 MG/1
25 TABLET, EXTENDED RELEASE ORAL DAILY
Qty: 90 TABLET | Refills: 0 | Status: SHIPPED | OUTPATIENT
Start: 2021-07-26 | End: 2021-10-07

## 2021-09-27 DIAGNOSIS — I42.9 SECONDARY CARDIOMYOPATHY (H): ICD-10-CM

## 2021-09-27 RX ORDER — LOSARTAN POTASSIUM 25 MG/1
25 TABLET ORAL DAILY
Qty: 90 TABLET | Refills: 0 | Status: SHIPPED | OUTPATIENT
Start: 2021-09-27 | End: 2021-10-07

## 2021-10-01 ENCOUNTER — HOSPITAL ENCOUNTER (OUTPATIENT)
Dept: CARDIOLOGY | Facility: CLINIC | Age: 36
Discharge: HOME | End: 2021-10-01
Attending: INTERNAL MEDICINE | Admitting: INTERNAL MEDICINE
Payer: COMMERCIAL

## 2021-10-01 DIAGNOSIS — I42.8 NONISCHEMIC CARDIOMYOPATHY (H): ICD-10-CM

## 2021-10-01 LAB — LVEF ECHO: NORMAL

## 2021-10-01 PROCEDURE — 255N000002 HC RX 255 OP 636: Performed by: INTERNAL MEDICINE

## 2021-10-01 PROCEDURE — 93306 TTE W/DOPPLER COMPLETE: CPT | Mod: 26 | Performed by: INTERNAL MEDICINE

## 2021-10-01 PROCEDURE — 999N000208 ECHOCARDIOGRAM COMPLETE

## 2021-10-01 PROCEDURE — C8929 TTE W OR WO FOL WCON,DOPPLER: HCPCS

## 2021-10-01 RX ADMIN — HUMAN ALBUMIN MICROSPHERES AND PERFLUTREN 3 ML: 10; .22 INJECTION, SOLUTION INTRAVENOUS at 13:52

## 2021-10-07 ENCOUNTER — OFFICE VISIT (OUTPATIENT)
Dept: CARDIOLOGY | Facility: CLINIC | Age: 36
End: 2021-10-07
Attending: INTERNAL MEDICINE
Payer: COMMERCIAL

## 2021-10-07 VITALS
WEIGHT: 209 LBS | SYSTOLIC BLOOD PRESSURE: 108 MMHG | HEART RATE: 93 BPM | DIASTOLIC BLOOD PRESSURE: 72 MMHG | HEIGHT: 69 IN | BODY MASS INDEX: 30.96 KG/M2

## 2021-10-07 DIAGNOSIS — I42.8 NONISCHEMIC CARDIOMYOPATHY (H): ICD-10-CM

## 2021-10-07 DIAGNOSIS — I42.9 SECONDARY CARDIOMYOPATHY (H): ICD-10-CM

## 2021-10-07 PROCEDURE — 99213 OFFICE O/P EST LOW 20 MIN: CPT | Performed by: INTERNAL MEDICINE

## 2021-10-07 RX ORDER — LOSARTAN POTASSIUM 25 MG/1
25 TABLET ORAL DAILY
Qty: 90 TABLET | Refills: 4 | Status: SHIPPED | OUTPATIENT
Start: 2021-10-07 | End: 2022-10-21

## 2021-10-07 RX ORDER — METOPROLOL SUCCINATE 25 MG/1
25 TABLET, EXTENDED RELEASE ORAL DAILY
Qty: 90 TABLET | Refills: 4 | Status: SHIPPED | OUTPATIENT
Start: 2021-10-07 | End: 2022-10-24

## 2021-10-07 ASSESSMENT — MIFFLIN-ST. JEOR: SCORE: 1873.4

## 2021-10-07 NOTE — PROGRESS NOTES
HPI and Plan:   See dictation    No orders of the defined types were placed in this encounter.      Orders Placed This Encounter   Medications     losartan (COZAAR) 25 MG tablet     Sig: Take 1 tablet (25 mg) by mouth daily     Dispense:  90 tablet     Refill:  4     metoprolol succinate ER (TOPROL-XL) 25 MG 24 hr tablet     Sig: Take 1 tablet (25 mg) by mouth daily     Dispense:  90 tablet     Refill:  4       Medications Discontinued During This Encounter   Medication Reason     metoprolol succinate ER (TOPROL-XL) 25 MG 24 hr tablet Reorder     losartan (COZAAR) 25 MG tablet Reorder         Encounter Diagnoses   Name Primary?     Secondary cardiomyopathy (H)      Nonischemic cardiomyopathy (H)        CURRENT MEDICATIONS:  Current Outpatient Medications   Medication Sig Dispense Refill     buPROPion (WELLBUTRIN XL) 150 MG 24 hr tablet Take 1 tablet (150 mg) by mouth every morning 90 tablet 0     cyclobenzaprine (FLEXERIL) 10 MG tablet Take 0.5-1 tablets (5-10 mg) by mouth 3 times daily as needed for muscle spasms 20 tablet 1     losartan (COZAAR) 25 MG tablet Take 1 tablet (25 mg) by mouth daily 90 tablet 4     metoprolol succinate ER (TOPROL-XL) 25 MG 24 hr tablet Take 1 tablet (25 mg) by mouth daily 90 tablet 4     rosuvastatin (CRESTOR) 20 MG tablet Take 1 tablet (20 mg) by mouth daily 90 tablet 2     tadalafil (CIALIS) 10 MG tablet TAKE ONE TABLET BY MOUTH EVERY DAY AS NEEDED FOR ERECTILE DYSFUNCTION 30 tablet 1     sildenafil (REVATIO) 20 MG tablet Take 2-5 tablets ( mg) by mouth 30 minutes to 4 hours before anticipated need for erectile dysfunction. (Patient not taking: Reported on 10/7/2021) 90 tablet 0       ALLERGIES     Allergies   Allergen Reactions     Diphenhydramine      Only when mixed with Penicillin - gets hyperactive  Elevated heart rate        PAST MEDICAL HISTORY:  Past Medical History:   Diagnosis Date     Parker's sarcoma (H) 1997    [place] - Rt Hip     Hyperlipidemia LDL goal <130       Major depressive disorder, recurrent episode, mild (H) 12/8/2016     Mild depression (H)      Nonischemic cardiomyopathy (H)     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 at age 12 - [facility] - Dr Hernandez - EF = 45%       PAST SURGICAL HISTORY:  Past Surgical History:   Procedure Laterality Date     DENTAL SURGERY  2001    wisdom teeth     SURGICAL HISTORY OF -   1997    Rt Hip/Ischium/Testicle removal - [facility]       FAMILY HISTORY:  Family History   Problem Relation Age of Onset     Hyperlipidemia Father      Gout Father      Hyperlipidemia Brother      Gout Brother      Colon Cancer Paternal Grandmother      Hyperlipidemia Brother      Gout Brother      Mental Illness Other         Suicide / Bi-polar       SOCIAL HISTORY:  Social History     Socioeconomic History     Marital status:      Spouse name: Toshia     Number of children: 0     Years of education: None     Highest education level: None   Occupational History     None   Tobacco Use     Smoking status: Never Smoker     Smokeless tobacco: Never Used   Substance and Sexual Activity     Alcohol use: Yes     Alcohol/week: 12.0 - 18.0 standard drinks     Comment: 12-18 drinks per week      Drug use: Yes     Types: Marijuana     Comment: marajuana      Sexual activity: Yes     Partners: Female     Birth control/protection: None   Other Topics Concern     Parent/sibling w/ CABG, MI or angioplasty before 65F 55M? No   Social History Narrative     None     Social Determinants of Health     Financial Resource Strain:      Difficulty of Paying Living Expenses:    Food Insecurity:      Worried About Running Out of Food in the Last Year:      Ran Out of Food in the Last Year:    Transportation Needs:      Lack of Transportation (Medical):      Lack of Transportation (Non-Medical):    Physical Activity:      Days of Exercise per Week:      Minutes of Exercise per Session:    Stress:      Feeling of Stress :    Social Connections:      Frequency of Communication with Friends and  "Family:      Frequency of Social Gatherings with Friends and Family:      Attends Sabianist Services:      Active Member of Clubs or Organizations:      Attends Club or Organization Meetings:      Marital Status:    Intimate Partner Violence:      Fear of Current or Ex-Partner:      Emotionally Abused:      Physically Abused:      Sexually Abused:        Review of Systems:  Skin:  Negative       Eyes:  Negative      ENT:  Negative      Respiratory:  Positive for cough;sleep apnea dental appliance   Cardiovascular:    Positive for chest pain very occ  Gastroenterology: Positive for heartburn    Genitourinary:  Negative      Musculoskeletal:  Positive for neck pain    Neurologic:  Negative      Psychiatric:  Negative      Heme/Lymph/Imm:  Positive for allergies    Endocrine:  Negative        Physical Exam:  Vitals: /72   Pulse 93   Ht 1.753 m (5' 9\")   Wt 94.8 kg (209 lb)   BMI 30.86 kg/m      Constitutional:  cooperative, alert and oriented, well developed, well nourished, in no acute distress        Skin:  warm and dry to the touch          Head:  normocephalic, no masses or lesions        Eyes:           Lymph:      ENT:           Neck:  carotid pulses are full and equal bilaterally        Respiratory:  normal symmetry         Cardiac: regular rhythm                                                         GI:  abdomen soft;no bruits        Extremities and Muscular Skeletal:  no edema              Neurological:  no gross motor deficits;affect appropriate        Psych:  Alert and Oriented x 3          CC  Maia Hernandez,   [address]  [place] [id number]                  "

## 2021-10-07 NOTE — LETTER
10/7/2021    Mark Riojas, DO  [address] [facility] [address]    RE: Agapito Yu       Dear Colleague,    I had the pleasure of seeing Agapito Yu in the [facility].    HPI and Plan:   See dictation    No orders of the defined types were placed in this encounter.      Orders Placed This Encounter   Medications     losartan (COZAAR) 25 MG tablet     Sig: Take 1 tablet (25 mg) by mouth daily     Dispense:  90 tablet     Refill:  4     metoprolol succinate ER (TOPROL-XL) 25 MG 24 hr tablet     Sig: Take 1 tablet (25 mg) by mouth daily     Dispense:  90 tablet     Refill:  4       Medications Discontinued During This Encounter   Medication Reason     metoprolol succinate ER (TOPROL-XL) 25 MG 24 hr tablet Reorder     losartan (COZAAR) 25 MG tablet Reorder         Encounter Diagnoses   Name Primary?     Secondary cardiomyopathy (H)      Nonischemic cardiomyopathy (H)        CURRENT MEDICATIONS:  Current Outpatient Medications   Medication Sig Dispense Refill     buPROPion (WELLBUTRIN XL) 150 MG 24 hr tablet Take 1 tablet (150 mg) by mouth every morning 90 tablet 0     cyclobenzaprine (FLEXERIL) 10 MG tablet Take 0.5-1 tablets (5-10 mg) by mouth 3 times daily as needed for muscle spasms 20 tablet 1     losartan (COZAAR) 25 MG tablet Take 1 tablet (25 mg) by mouth daily 90 tablet 4     metoprolol succinate ER (TOPROL-XL) 25 MG 24 hr tablet Take 1 tablet (25 mg) by mouth daily 90 tablet 4     rosuvastatin (CRESTOR) 20 MG tablet Take 1 tablet (20 mg) by mouth daily 90 tablet 2     tadalafil (CIALIS) 10 MG tablet TAKE ONE TABLET BY MOUTH EVERY DAY AS NEEDED FOR ERECTILE DYSFUNCTION 30 tablet 1     sildenafil (REVATIO) 20 MG tablet Take 2-5 tablets ( mg) by mouth 30 minutes to 4 hours before anticipated need for erectile dysfunction. 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     Hyperlipidemia LDL goal <130      Major depressive disorder, recurrent episode, mild (H) 12/8/2016     Mild depression (H)      Nonischemic cardiomyopathy (H)     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 at age 12 - [facility] - Dr Hernandez - EF = 45%       PAST SURGICAL HISTORY:  Past Surgical History:   Procedure Laterality Date     DENTAL SURGERY  2001    wisdom teeth     SURGICAL HISTORY OF -   1997    Rt Hip/Ischium/Testicle removal - Chang's       FAMILY HISTORY:  Family History   Problem Relation Age of Onset     Hyperlipidemia Father      Gout Father      Hyperlipidemia Brother      Gout Brother      Colon Cancer Paternal Grandmother      Hyperlipidemia Brother      Gout Brother      Mental Illness Other         Suicide / Bi-polar       SOCIAL HISTORY:  Social History     Socioeconomic History     Marital status:      Spouse name: Toshai     Number of children: 0     Years of education: None     Highest education level: None   Occupational History     None   Tobacco Use     Smoking status: Never Smoker     Smokeless tobacco: Never Used   Substance and Sexual Activity     Alcohol use: Yes     Alcohol/week: 12.0 - 18.0 standard drinks     Comment: 12-18 drinks per week      Drug use: Yes     Types: Marijuana     Comment: marajuana      Sexual activity: Yes     Partners: Female     Birth control/protection: None   Other Topics Concern     Parent/sibling w/ CABG, MI or angioplasty before 65F 55M? No   Social History Narrative     None     Social Determinants of Health     Financial Resource Strain:      Difficulty of Paying Living Expenses:    Food Insecurity:      Worried About Running Out of Food in the Last Year:      Ran Out of Food in the Last Year:    Transportation Needs:      Lack of Transportation (Medical):  "     Lack of Transportation (Non-Medical):    Physical Activity:      Days of Exercise per Week:      Minutes of Exercise per Session:    Stress:      Feeling of Stress :    Social Connections:      Frequency of Communication with Friends and Family:      Frequency of Social Gatherings with Friends and Family:      Attends Synagogue Services:      Active Member of Clubs or Organizations:      Attends Club or Organization Meetings:      Marital Status:    Intimate Partner Violence:      Fear of Current or Ex-Partner:      Emotionally Abused:      Physically Abused:      Sexually Abused:        Review of Systems:  Skin:  Negative       Eyes:  Negative      ENT:  Negative      Respiratory:  Positive for cough;sleep apnea dental appliance   Cardiovascular:    Positive for chest pain very occ  Gastroenterology: Positive for heartburn    Genitourinary:  Negative      Musculoskeletal:  Positive for neck pain    Neurologic:  Negative      Psychiatric:  Negative      Heme/Lymph/Imm:  Positive for allergies    Endocrine:  Negative        Physical Exam:  Vitals: /72   Pulse 93   Ht 1.753 m (5' 9\")   Wt 94.8 kg (209 lb)   BMI 30.86 kg/m      Constitutional:  cooperative, alert and oriented, well developed, well nourished, in no acute distress        Skin:  warm and dry to the touch          Head:  normocephalic, no masses or lesions        Eyes:           Lymph:      ENT:           Neck:  carotid pulses are full and equal bilaterally        Respiratory:  normal symmetry         Cardiac: regular rhythm                                                         GI:  abdomen soft;no bruits        Extremities and Muscular Skeletal:  no edema              Neurological:  no gross motor deficits;affect appropriate        Psych:  Alert and Oriented x 3          CC  Maia Hernandez, DO  [address]  [place] [address]                      Thank you for allowing me to participate in the care of your " patient.      Sincerely,     Maia Hernandez DO     [facility]  cc:   Maia Hernandez DO  [address] [place] [id number]

## 2021-10-07 NOTE — PROGRESS NOTES
Service Date: 10/07/2021    REFERRING PROVIDER:  Mark Riojas MD    HISTORY OF PRESENT ILLNESS:  Mr. Yu is a pleasant 35-year-old male with a history of secondary cardiomyopathy due to chemotherapy.  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  He is on metoprolol and losartan for his cardiomyopathy.  We did repeat an echo this year to monitor his LV function.  It looks stable.  His EF has remained around 40%-45% with mild global hypokinesis.  No significant valvular abnormalities.  I went over this with him today.  He seems to be doing well.  He sometimes forgets to take his pills, but he is trying to remember. We did switch him to metoprolol from carvedilol to try to improve upon compliance with once daily dosing.  I did renew those prescriptions for him today.  He does follow with Dr. Riojas on an annual basis and has kidney function and electrolytes tested.  He is due for that next week.  He has received his COVID vaccination and he will be getting a booster shot today as well as influenza.    PHYSICAL EXAMINATION:    VITAL SIGNS:  Today his blood pressure is 108/72, pulse of 93.  Weight is 209.  NECK:  Carotid upstrokes were brisk without bruit.  CARDIOVASCULAR:  Heart tones are regular without murmur, gallop or rub.  RESPIRATORY:  Lungs are clear posteriorly.  EXTREMITIES:  He has no peripheral edema.    In summary, Mr. 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  He is doing well from a cardiac perspective.  He is maintained on metoprolol and losartan.  I have encouraged him to remain compliant with those medications.  He is also on cholesterol-lowering medicine for mixed hyperlipidemia, which is followed by his primary and he is due to see him in the next week.  I did refill those medications for him today.  I am happy to see him back every 2 years or as  needed.    Please feel free to contact me with any questions you have in regards to his care.    Maia Hernandez DO    cc:  Mark Riojas DO  [address] [place]  [address]        Maia Hernandez DO        D: 10/07/2021   T: 10/07/2021   MT: aj    Name:     ANETTE SPANN  MRN:      [id number]        Account:      [id number]   :      1985           Service Date: 10/07/2021       Document: [id number]

## 2021-10-09 ENCOUNTER — HEALTH MAINTENANCE LETTER (OUTPATIENT)
Age: 36
End: 2021-10-09

## 2021-12-06 ENCOUNTER — E-VISIT (OUTPATIENT)
Dept: FAMILY MEDICINE | Facility: CLINIC | Age: 36
End: 2021-12-06
Payer: COMMERCIAL

## 2021-12-06 DIAGNOSIS — M54.41 LOW BACK PAIN WITH RIGHT-SIDED SCIATICA, UNSPECIFIED BACK PAIN LATERALITY, UNSPECIFIED CHRONICITY: Primary | ICD-10-CM

## 2021-12-06 PROCEDURE — 99421 OL DIG E/M SVC 5-10 MIN: CPT | Performed by: FAMILY MEDICINE

## 2021-12-06 RX ORDER — CYCLOBENZAPRINE HCL 10 MG
5-10 TABLET ORAL 3 TIMES DAILY PRN
Qty: 30 TABLET | Refills: 0 | Status: SHIPPED | OUTPATIENT
Start: 2021-12-06 | End: 2022-05-13

## 2021-12-06 RX ORDER — PREDNISONE 20 MG/1
TABLET ORAL
Qty: 20 TABLET | Refills: 0 | Status: SHIPPED | OUTPATIENT
Start: 2021-12-06 | End: 2022-05-13

## 2021-12-06 NOTE — PATIENT INSTRUCTIONS
Caring for Your Back    You are not alone.    Low back pain is very common. Nearly half of all adults will have low back pain in any given year. The good news is that back pain is rarely a danger to your health. Most people can manage their back pain on their own. About half of people start feeling better within two weeks. In 9 out of 10 cases, low back pain goes away or no longer limits daily activity within 6 weeks.     Your outlook is good!     Your symptoms tell us that your low back pain is most likely not a danger to you. Most of the time we will not know the exact cause of low back pain, even if you see a doctor or have an MRI. However, treatment can still work without knowing the cause of the pain. Less than 1 in 100 people need surgery for their back pain.     What can I do about my low back pain?     There are three basic things you can do to ease low back pain and help it go away.     Use heat or cold packs.    Take medicine as directed.    Use positions, movements and exercises.    Using heat or cold packs:    Try cold packs or gentle heat to ease your pain.  Use whichever gives you the most relief. Apply the cold pack or heat for 15 minutes at a time, as often as needed.    Taking medicine:    If taking over-the-counter medicine:    Take ibuprofen (Advil, Motrin) 600 mg three times a day as needed for pain.  OR    Take Aleve (naproxen) 220 to 440 mg two times a day as needed for pain. If your doctor prescribed a muscle relaxant (cyclobenzaprine 10 mg.):    Take   to 1 tablet at bedtime.    Do not drive when taking this medicine. This drug may make you sleepy.     Using positions, movements and exercises:    Research tells us that moving your joints and muscles can help you recover from back pain. Such activity should be simple and gentle. Use the positions below as well as walking to help relieve your pain. Try taking a short walk every 3 to 4 hours during the day. Walk for a few minutes inside your  home or take longer walks outside, on a treadmill or at a mall. Slowly increase the amount of time you walk. Expect discomfort when you begin, but it should lessen as your back starts to heal. When your back feels better, walk daily to keep your back and body healthy.    Finding a position that is comfortable:    When your back pain is new, certain positions will ease your pain. Gently try each of the positions below until you find one that is helpful. Once you find a position of comfort, use it as often as you like when you are resting. You will recover faster if you combine rest with activity.    * Lie on your back with your legs bent. You can do this by placing a pillow under your knees or lie on the floor and rest your lower legs on the seat of a chair.  * Lie on your side with your knees bent and place a pillow between your knees.    Lie on your stomach over pillows.       When should I call my doctor?    Your back pain should improve over the first couple of weeks. As it improves, you should be able to return to your normal activities.  But call your doctor if:      You have a sudden change in your ability to control your bladder or bowels.    You begin to feel tingling in your groin or legs.    The pain spreads down your leg and into your foot.    Your toes, feet or leg muscles begin to feel weak.    You feel generally unwell or sick.    Your pain gets worse.    If you are deaf or hard of hearing, please let us know. We provide many free services including sign language interpreters, oral interpreters, TTYs, telephone amplifiers, note takers and written materials.    For informational purposes only. Not to replace the advice of your health care provider. Copyright   2013 [facility]. All rights reserved. [employer] 685759 - 04/13.  In addition to a muscle relaxant, let's start an oral steroid. Like last year, your symptoms sound like they are caused by a pinched nerve in your back. Gentle  stretching and ice/heat can also be helpful. If your symptoms aren't improving over the next week, we should have you come into the office for further evaluation and consider imaging (like an MRI), physical therapy, or a referral to one of our spine doctors.

## 2021-12-24 DIAGNOSIS — E78.5 HYPERLIPIDEMIA LDL GOAL <130: ICD-10-CM

## 2021-12-27 NOTE — TELEPHONE ENCOUNTER
Routing refill request to provider for review/approval because:  Labs not current:      Pending Prescriptions:                       Disp   Refills    rosuvastatin (CRESTOR) 20 MG tablet [Pharm*90 tab*2        Sig: TAKE ONE TABLET BY MOUTH EVERY DAY

## 2021-12-28 RX ORDER — ROSUVASTATIN CALCIUM 20 MG/1
TABLET, COATED ORAL
Qty: 90 TABLET | Refills: 2 | Status: SHIPPED | OUTPATIENT
Start: 2021-12-28 | End: 2022-12-19

## 2022-01-22 ENCOUNTER — E-VISIT (OUTPATIENT)
Dept: FAMILY MEDICINE | Facility: CLINIC | Age: 37
End: 2022-01-22
Payer: COMMERCIAL

## 2022-01-22 DIAGNOSIS — F33.0 MAJOR DEPRESSIVE DISORDER, RECURRENT EPISODE, MILD (H): ICD-10-CM

## 2022-01-22 PROCEDURE — 99421 OL DIG E/M SVC 5-10 MIN: CPT | Performed by: FAMILY MEDICINE

## 2022-01-22 RX ORDER — BUPROPION HYDROCHLORIDE 150 MG/1
150 TABLET ORAL EVERY MORNING
Qty: 90 TABLET | Refills: 1 | Status: SHIPPED | OUTPATIENT
Start: 2022-01-22 | End: 2022-03-14

## 2022-01-22 ASSESSMENT — ANXIETY QUESTIONNAIRES
6. BECOMING EASILY ANNOYED OR IRRITABLE: MORE THAN HALF THE DAYS
1. FEELING NERVOUS, ANXIOUS, OR ON EDGE: SEVERAL DAYS
GAD7 TOTAL SCORE: 7
GAD7 TOTAL SCORE: 7
7. FEELING AFRAID AS IF SOMETHING AWFUL MIGHT HAPPEN: NOT AT ALL
7. FEELING AFRAID AS IF SOMETHING AWFUL MIGHT HAPPEN: NOT AT ALL
2. NOT BEING ABLE TO STOP OR CONTROL WORRYING: SEVERAL DAYS
4. TROUBLE RELAXING: SEVERAL DAYS
8. IF YOU CHECKED OFF ANY PROBLEMS, HOW DIFFICULT HAVE THESE MADE IT FOR YOU TO DO YOUR WORK, TAKE CARE OF THINGS AT HOME, OR GET ALONG WITH OTHER PEOPLE?: NOT DIFFICULT AT ALL
5. BEING SO RESTLESS THAT IT IS HARD TO SIT STILL: SEVERAL DAYS
3. WORRYING TOO MUCH ABOUT DIFFERENT THINGS: SEVERAL DAYS
GAD7 TOTAL SCORE: 7

## 2022-01-22 ASSESSMENT — PATIENT HEALTH QUESTIONNAIRE - PHQ9
10. IF YOU CHECKED OFF ANY PROBLEMS, HOW DIFFICULT HAVE THESE PROBLEMS MADE IT FOR YOU TO DO YOUR WORK, TAKE CARE OF THINGS AT HOME, OR GET ALONG WITH OTHER PEOPLE: SOMEWHAT DIFFICULT
SUM OF ALL RESPONSES TO PHQ QUESTIONS 1-9: 8
SUM OF ALL RESPONSES TO PHQ QUESTIONS 1-9: 8

## 2022-01-22 NOTE — TELEPHONE ENCOUNTER
Provider E-Visit time total (minutes): 7      PHQ 4/27/2021 4/27/2021 1/22/2022   PHQ-9 Total Score 9 3 8   Q9: Thoughts of better off dead/self-harm past 2 weeks Several days Not at all Not at all   F/U: Thoughts of suicide or self-harm - - -   F/U: Self harm-plan - - -   F/U: Self-harm action - - -   F/U: Safety concerns - - -     FABIAN-7 SCORE 11/9/2020 1/28/2021 1/22/2022   Total Score 10 (moderate anxiety) - 7 (mild anxiety)   Total Score 10 5 7       Mark Riojas DO  1/22/2022 3:52 PM

## 2022-01-23 ASSESSMENT — ANXIETY QUESTIONNAIRES: GAD7 TOTAL SCORE: 7

## 2022-01-23 ASSESSMENT — PATIENT HEALTH QUESTIONNAIRE - PHQ9: SUM OF ALL RESPONSES TO PHQ QUESTIONS 1-9: 8

## 2022-03-11 DIAGNOSIS — F33.0 MAJOR DEPRESSIVE DISORDER, RECURRENT EPISODE, MILD (H): ICD-10-CM

## 2022-03-14 RX ORDER — BUPROPION HYDROCHLORIDE 150 MG/1
150 TABLET ORAL EVERY MORNING
Qty: 90 TABLET | Refills: 1 | Status: SHIPPED | OUTPATIENT
Start: 2022-03-14 | End: 2022-10-11

## 2022-03-14 NOTE — TELEPHONE ENCOUNTER
Routing refill request to provider for review/approval because:  PHQ-9 out range     PHQ-9 score:    PHQ 1/22/2022   PHQ-9 Total Score 8   Q9: Thoughts of better off dead/self-harm past 2 weeks Not at all   F/U: Thoughts of suicide or self-harm -   F/U: Self harm-plan -   F/U: Self-harm action -   F/U: Safety concerns -     Routing to provider to review and advise.     Edith Olmos RN  [facility]

## 2022-03-22 ENCOUNTER — MYC MEDICAL ADVICE (OUTPATIENT)
Dept: FAMILY MEDICINE | Facility: CLINIC | Age: 37
End: 2022-03-22
Payer: COMMERCIAL

## 2022-03-22 DIAGNOSIS — G89.29 CHRONIC NECK PAIN: ICD-10-CM

## 2022-03-22 DIAGNOSIS — M54.2 CHRONIC NECK PAIN: ICD-10-CM

## 2022-03-24 PROBLEM — G89.29 CHRONIC NECK PAIN: Status: ACTIVE | Noted: 2022-03-24

## 2022-03-24 PROBLEM — M54.2 CHRONIC NECK PAIN: Status: ACTIVE | Noted: 2022-03-24

## 2022-05-12 DIAGNOSIS — M54.41 LOW BACK PAIN WITH RIGHT-SIDED SCIATICA, UNSPECIFIED BACK PAIN LATERALITY, UNSPECIFIED CHRONICITY: ICD-10-CM

## 2022-05-12 NOTE — TELEPHONE ENCOUNTER
cyclobenzaprine (FLEXERIL) 10 MG tablet            Last Written Prescription Date:  12.6.21  Last Fill Quantity: 30 tablet,  # refills: 0   Last office visit: 4/27/2021 with prescribing provider:  DO CHASITY       Future Office Visit:        Routing refill request to provider for review/approval because:  Drug not on the [facility], [facility] or [facility] refill protocol or controlled substance

## 2022-05-13 RX ORDER — CYCLOBENZAPRINE HCL 10 MG
5-10 TABLET ORAL 3 TIMES DAILY PRN
Qty: 30 TABLET | Refills: 0 | Status: SHIPPED | OUTPATIENT
Start: 2022-05-13 | End: 2022-12-06

## 2022-05-13 RX ORDER — PREDNISONE 20 MG/1
TABLET ORAL
Qty: 20 TABLET | Refills: 0 | Status: SHIPPED | OUTPATIENT
Start: 2022-05-13 | End: 2022-10-11

## 2022-06-30 NOTE — RESULT ENCOUNTER NOTE
Deabeatriz Altman,     -Normal red blood cell (hgb) levels, normal white blood cell count and normal platelet levels.    -LDL(bad) cholesterol level is elevated, and your triglycerides are significantly elevated which can increase your heart disease risk.  A diet high in fat and simple carbohydrates, genetics and being overweight can contribute to this. ADVISE: exercising 150 minutes of aerobic exercise per week (30 minutes for 5 days per week or 50 minutes for 3 days per week are options), eating a low saturated fat/low carbohydrate diet, and omega-3 fatty acids (fish oil) 0137-1994 mg daily are helpful to improve this. In 3-6 months, you should recheck your fasting cholesterol panel by scheduling a lab-only appointment.    -Kidney function is normal (Cr, GFR), Sodium is normal, Potassium is normal, Calcium is normal, Glucose is normal.       Please send a [employer] message or call [phone]  if you have any questions.      Savana Bhakta, JESICA, CNP  [facility]    If you have further questions about the interpretation of your labs, [email or web address] is a good website to check out for further information.    
Dear Agapito,     Testosterone level was within a normal range.      Please send a [employer] message or call [phone]  if you have any questions.      Savana Bhakta, JESICA, CNP  [place]    If you have further questions about the interpretation of your labs, [email or web address] is a good website to check out for further information.    
Implemented All Universal Safety Interventions:  [place] to call system. Call bell, personal items and telephone within reach. Instruct patient to call for assistance. Room bathroom lighting operational. Non-slip footwear when patient is off stretcher. Physically safe environment: no spills, clutter or unnecessary equipment. Stretcher in lowest position, wheels locked, appropriate side rails in place.

## 2022-09-11 ENCOUNTER — HEALTH MAINTENANCE LETTER (OUTPATIENT)
Age: 37
End: 2022-09-11

## 2022-10-11 ENCOUNTER — OFFICE VISIT (OUTPATIENT)
Dept: FAMILY MEDICINE | Facility: CLINIC | Age: 37
End: 2022-10-11
Payer: COMMERCIAL

## 2022-10-11 VITALS
SYSTOLIC BLOOD PRESSURE: 118 MMHG | RESPIRATION RATE: 12 BRPM | DIASTOLIC BLOOD PRESSURE: 72 MMHG | WEIGHT: 193 LBS | HEART RATE: 86 BPM | OXYGEN SATURATION: 98 % | HEIGHT: 69 IN | TEMPERATURE: 97.8 F | BODY MASS INDEX: 28.58 KG/M2

## 2022-10-11 DIAGNOSIS — Z11.59 NEED FOR HEPATITIS C SCREENING TEST: ICD-10-CM

## 2022-10-11 DIAGNOSIS — F33.0 MAJOR DEPRESSIVE DISORDER, RECURRENT EPISODE, MILD (H): ICD-10-CM

## 2022-10-11 DIAGNOSIS — Z23 HIGH PRIORITY FOR 2019-NCOV VACCINE: ICD-10-CM

## 2022-10-11 DIAGNOSIS — E78.5 HYPERLIPIDEMIA LDL GOAL <130: ICD-10-CM

## 2022-10-11 DIAGNOSIS — Z00.00 ROUTINE GENERAL MEDICAL EXAMINATION AT A HEALTH CARE FACILITY: Primary | ICD-10-CM

## 2022-10-11 DIAGNOSIS — Z13.1 SCREENING FOR DIABETES MELLITUS: ICD-10-CM

## 2022-10-11 LAB
ALBUMIN SERPL-MCNC: 4.4 G/DL (ref 3.4–5)
ALP SERPL-CCNC: 38 U/L (ref 40–150)
ALT SERPL W P-5'-P-CCNC: 35 U/L (ref 0–70)
ANION GAP SERPL CALCULATED.3IONS-SCNC: 7 MMOL/L (ref 3–14)
AST SERPL W P-5'-P-CCNC: 22 U/L (ref 0–45)
BILIRUB SERPL-MCNC: 0.7 MG/DL (ref 0.2–1.3)
BUN SERPL-MCNC: 22 MG/DL (ref 7–30)
CALCIUM SERPL-MCNC: 9.9 MG/DL (ref 8.5–10.1)
CHLORIDE BLD-SCNC: 107 MMOL/L (ref 94–109)
CHOLEST SERPL-MCNC: 179 MG/DL
CO2 SERPL-SCNC: 23 MMOL/L (ref 20–32)
CREAT SERPL-MCNC: 1.01 MG/DL (ref 0.66–1.25)
ERYTHROCYTE [DISTWIDTH] IN BLOOD BY AUTOMATED COUNT: 12.4 % (ref 10–15)
FASTING STATUS PATIENT QL REPORTED: YES
GFR SERPL CREATININE-BSD FRML MDRD: >90 ML/MIN/1.73M2
GLUCOSE BLD-MCNC: 96 MG/DL (ref 70–99)
HCT VFR BLD AUTO: 37.4 % (ref 40–53)
HCV AB SERPL QL IA: NONREACTIVE
HDLC SERPL-MCNC: 59 MG/DL
HGB BLD-MCNC: 13.1 G/DL (ref 13.3–17.7)
LDLC SERPL CALC-MCNC: 77 MG/DL
MCH RBC QN AUTO: 32.4 PG (ref 26.5–33)
MCHC RBC AUTO-ENTMCNC: 35 G/DL (ref 31.5–36.5)
MCV RBC AUTO: 93 FL (ref 78–100)
NONHDLC SERPL-MCNC: 120 MG/DL
PLATELET # BLD AUTO: 202 10E3/UL (ref 150–450)
POTASSIUM BLD-SCNC: 3.8 MMOL/L (ref 3.4–5.3)
PROT SERPL-MCNC: 7.7 G/DL (ref 6.8–8.8)
RBC # BLD AUTO: 4.04 10E6/UL (ref 4.4–5.9)
SODIUM SERPL-SCNC: 137 MMOL/L (ref 133–144)
TRIGL SERPL-MCNC: 213 MG/DL
WBC # BLD AUTO: 5.5 10E3/UL (ref 4–11)

## 2022-10-11 PROCEDURE — 96127 BRIEF EMOTIONAL/BEHAV ASSMT: CPT | Performed by: FAMILY MEDICINE

## 2022-10-11 PROCEDURE — 80053 COMPREHEN METABOLIC PANEL: CPT | Performed by: FAMILY MEDICINE

## 2022-10-11 PROCEDURE — 80061 LIPID PANEL: CPT | Performed by: FAMILY MEDICINE

## 2022-10-11 PROCEDURE — 99395 PREV VISIT EST AGE 18-39: CPT | Mod: 25 | Performed by: FAMILY MEDICINE

## 2022-10-11 PROCEDURE — 0124A COVID-19,PF,PFIZER BOOSTER BIVALENT: CPT | Performed by: FAMILY MEDICINE

## 2022-10-11 PROCEDURE — 85027 COMPLETE CBC AUTOMATED: CPT | Performed by: FAMILY MEDICINE

## 2022-10-11 PROCEDURE — 84403 ASSAY OF TOTAL TESTOSTERONE: CPT | Performed by: FAMILY MEDICINE

## 2022-10-11 PROCEDURE — 91312 COVID-19,PF,PFIZER BOOSTER BIVALENT: CPT | Performed by: FAMILY MEDICINE

## 2022-10-11 PROCEDURE — 36415 COLL VENOUS BLD VENIPUNCTURE: CPT | Performed by: FAMILY MEDICINE

## 2022-10-11 PROCEDURE — 99213 OFFICE O/P EST LOW 20 MIN: CPT | Mod: 25 | Performed by: FAMILY MEDICINE

## 2022-10-11 PROCEDURE — 86803 HEPATITIS C AB TEST: CPT | Performed by: FAMILY MEDICINE

## 2022-10-11 RX ORDER — BUPROPION HYDROCHLORIDE 150 MG/1
150 TABLET ORAL EVERY MORNING
Qty: 90 TABLET | Refills: 1 | Status: SHIPPED | OUTPATIENT
Start: 2022-10-11 | End: 2023-07-03

## 2022-10-11 ASSESSMENT — ENCOUNTER SYMPTOMS
EYE PAIN: 0
SHORTNESS OF BREATH: 0
DIARRHEA: 0
WEAKNESS: 0
HEMATURIA: 0
NERVOUS/ANXIOUS: 0
HEARTBURN: 0
JOINT SWELLING: 0
PARESTHESIAS: 0
ABDOMINAL PAIN: 0
CONSTIPATION: 0
FEVER: 0
COUGH: 0
MYALGIAS: 0
PALPITATIONS: 0
NAUSEA: 0
ARTHRALGIAS: 0
CHILLS: 0
DYSURIA: 0
HEMATOCHEZIA: 0
FREQUENCY: 0
DIZZINESS: 0
HEADACHES: 0
SORE THROAT: 0

## 2022-10-11 ASSESSMENT — PAIN SCALES - GENERAL: PAINLEVEL: NO PAIN (0)

## 2022-10-11 ASSESSMENT — PATIENT HEALTH QUESTIONNAIRE - PHQ9
SUM OF ALL RESPONSES TO PHQ QUESTIONS 1-9: 6
SUM OF ALL RESPONSES TO PHQ QUESTIONS 1-9: 6
10. IF YOU CHECKED OFF ANY PROBLEMS, HOW DIFFICULT HAVE THESE PROBLEMS MADE IT FOR YOU TO DO YOUR WORK, TAKE CARE OF THINGS AT HOME, OR GET ALONG WITH OTHER PEOPLE: NOT DIFFICULT AT ALL

## 2022-10-11 NOTE — PROGRESS NOTES
SUBJECTIVE:   CC: Agapito is an 36 year old who presents for preventative health visit.       Patient has been advised of split billing requirements and indicates understanding: Yes     Healthy Habits:     Getting at least 3 servings of Calcium per day:  NO    Bi-annual eye exam:  Yes    Dental care twice a year:  Yes    Sleep apnea or symptoms of sleep apnea:  Sleep apnea    Diet:  Regular (no restrictions)    Frequency of exercise:  2-3 days/week    Duration of exercise:  30-45 minutes    Taking medications regularly:  Yes    Medication side effects:  Muscle aches    PHQ-2 Total Score: 2    Additional concerns today:  No      Hyperlipidemia Follow-Up      Are you regularly taking any medication or supplement to lower your cholesterol?   Yes- rosuvastatin (CRESTOR) 20 MG tablet  Are you having muscle aches or other side effects that you think could be caused by your cholesterol lowering medication?  No     Hypertension Follow-up      Do you check your blood pressure regularly outside of the clinic? No     Are you following a low salt diet? No    Are your blood pressures ever more than 140 on the top number (systolic) OR more   than 90 on the bottom number (diastolic), for example 140/90? No     BP Readings from Last 6 Encounters:   10/11/22 118/72   10/07/21 108/72   01/28/21 120/82   07/16/20 122/88   06/26/19 123/80   06/06/19 126/84   Denies any headaches, lightheadedness, dizziness, vision changes, chest pain, palpitations, shortness of breath, dyspnea, numbness/tingling.      Depression Followup    How are you doing with your depression since your last visit? Improved     Are you having other symptoms that might be associated with depression? No    Have you had a significant life event?  OTHER: Stress     Are you feeling anxious or having panic attacks?   No    Do you have any concerns with your use of alcohol or other drugs? Yes:  alcohol     Social History     Tobacco Use     Smoking status: Never      Smokeless tobacco: Never   Substance Use Topics     Alcohol use: Yes     Alcohol/week: 12.0 - 18.0 standard drinks     Types: 12 - 18 Standard drinks or equivalent per week     Comment: 18-24 drinks per week  generally Thursday-Saturday     Drug use: Yes     Types: Marijuana     Comment: marijuana - weekly     PHQ 4/27/2021 1/22/2022 10/11/2022   PHQ-9 Total Score 3 8 6   Q9: Thoughts of better off dead/self-harm past 2 weeks Not at all Not at all Not at all   F/U: Thoughts of suicide or self-harm - - -   F/U: Self harm-plan - - -   F/U: Self-harm action - - -   F/U: Safety concerns - - -     FABIAN-7 SCORE 11/9/2020 1/28/2021 1/22/2022   Total Score 10 (moderate anxiety) - 7 (mild anxiety)   Total Score 10 5 7     Last PHQ-9 10/11/2022   1.  Little interest or pleasure in doing things 1   2.  Feeling down, depressed, or hopeless 1   3.  Trouble falling or staying asleep, or sleeping too much 2   4.  Feeling tired or having little energy 1   5.  Poor appetite or overeating 1   6.  Feeling bad about yourself 0   7.  Trouble concentrating 0   8.  Moving slowly or restless 0   Q9: Thoughts of better off dead/self-harm past 2 weeks 0   PHQ-9 Total Score 6   Difficulty at work, home, or with people -   In the past two weeks have you had thoughts of suicide or self harm? -   Do you have concerns about your personal safety or the safety of others? -   In the past 2 weeks have you thought about a plan or had intention to harm yourself? -   In the past 2 weeks have you acted on these thoughts in any way? -     FABIAN-7  1/22/2022   1. Feeling nervous, anxious, or on edge 1   2. Not being able to stop or control worrying 1   3. Worrying too much about different things 1   4. Trouble relaxing 1   5. Being so restless that it is hard to sit still 1   6. Becoming easily annoyed or irritable 2   7. Feeling afraid, as if something awful might happen 0   FABIAN-7 Total Score 7   If you checked any problems, how difficult have they made it  for you to do your work, take care of things at home, or get along with other people? -       Suicide Assessment Five-step Evaluation and Treatment (SAFE-T)    Today's PHQ-2 Score:   PHQ-2 ( 1999 Pfizer) 10/11/2022   Q1: Little interest or pleasure in doing things 1   Q2: Feeling down, depressed or hopeless 1   PHQ-2 Score 2   PHQ-2 Total Score (12-17 Years)- Positive if 3 or more points; Administer PHQ-A if positive -   Q1: Little interest or pleasure in doing things Several days   Q2: Feeling down, depressed or hopeless Several days   PHQ-2 Score 2       Abuse: Current or Past(Physical, Sexual or Emotional)- NO  Do you feel safe in your environment? YES    Social History     Tobacco Use     Smoking status: Never     Smokeless tobacco: Never   Substance Use Topics     Alcohol use: Yes     Alcohol/week: 12.0 - 18.0 standard drinks     Types: 12 - 18 Standard drinks or equivalent per week     Comment: 18-24 drinks per week  generally Thursday-Saturday     If you drink alcohol do you typically have >3 drinks per day or >7 drinks per week? Yes      Alcohol Use 10/11/2022   Prescreen: >3 drinks/day or >7 drinks/week? Yes   Prescreen: >3 drinks/day or >7 drinks/week? -   AUDIT SCORE  14     AUDIT - Alcohol Use Disorders Identification Test - Reproduced from the World Health Organization Audit 2001 (Second Edition) 10/11/2022   1.  How often do you have a drink containing alcohol? 4 or more times a week   2.  How many drinks containing alcohol do you have on a typical day when you are drinking? 5 or 6   3.  How often do you have five or more drinks on one occasion? Weekly   4.  How often during the last year have you found that you were not able to stop drinking once you had started? Less than monthly   5.  How often during the last year have you failed to do what was normally expected of you because of drinking? Less than monthly   6.  How often during the last year have you needed a first drink in the morning to get  yourself going after a heavy drinking session? Never   7.  How often during the last year have you had a feeling of guilt or remorse after drinking? Less than monthly   8.  How often during the last year have you been unable to remember what happened the night before because of your drinking? Never   9.  Have you or someone else been injured because of your drinking? No   10. Has a relative, friend, doctor or other health care worker been concerned about your drinking or suggested you cut down? Yes, but not in the last year   TOTAL SCORE 14       Last PSA: No results found for: PSA    Reviewed orders with patient. 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   HENT: Negative for congestion, ear pain, hearing loss and sore throat.    Eyes: Negative for pain and visual disturbance.   Respiratory: Negative for cough and shortness of breath.    Cardiovascular: Negative for chest pain, palpitations and peripheral edema.   Gastrointestinal: Negative for abdominal pain, constipation, diarrhea, heartburn, hematochezia and nausea.   Genitourinary: Negative for dysuria, frequency, genital sores, hematuria,  "impotence, penile discharge and urgency.   Musculoskeletal: Negative for arthralgias, joint swelling and myalgias.   Skin: Negative for rash.   Neurological: Negative for dizziness, weakness, headaches and paresthesias.   Psychiatric/Behavioral: Negative for mood changes. The patient is not nervous/anxious.        OBJECTIVE:   /72   Pulse 86   Temp 97.8  F (36.6  C) (Tympanic)   Resp 12   Ht 1.753 m (5' 9\")   Wt 87.5 kg (193 lb)   SpO2 98%   BMI 28.50 kg/m      Physical Exam  GENERAL: healthy, alert and no distress  EYES: Eyes grossly normal to inspection  HENT: ear canals and TM's normal, nose and mouth without ulcers or lesions  NECK: no adenopathy, no asymmetry, masses, or scars and thyroid normal to palpation  RESP: lungs clear to auscultation - no rales, rhonchi or wheezes  CV: regular rate and rhythm, normal S1 S2, no S3 or S4, no murmur, click or rub  MS: no gross musculoskeletal defects noted, no edema  SKIN: no suspicious lesions or rashes  PSYCH: mentation appears normal, affect normal/bright    Diagnostic Test Results:  Labs reviewed in Epic    ASSESSMENT/PLAN:   1. Routine general medical examination at a health care facility  Health maintenance reviewed and updated. Overall doing well. Emphasized importance of cutting back on alcohol use -- max 2 drinks per day. Follow up if this is becoming an issue.  - CBC with Platelets; Future  - Testosterone, total; Future  - CBC with Platelets  - Testosterone, total    2. Need for hepatitis C screening test: Discussed USPSTF recommendation for one time hepatitis C screening for all adults aged 18 to 79 years.  Discussed risk factors for hepatitis C including blood transfusion and IV drug use.  - Hepatitis C Screen Reflex to HCV RNA Quant and Genotype; Future  - Hepatitis C Screen Reflex to HCV RNA Quant and Genotype    3. Hyperlipidemia LDL goal <130: recheck lipids. Continue rosuvastatin 20 mg daily.  - Lipid panel reflex to direct LDL Non-fasting; " "Future  - Lipid panel reflex to direct LDL Non-fasting    4. Major depressive disorder, recurrent episode, mild (H)  Well controlled. Continue Wellbutrin 150 mg daily.  - buPROPion (WELLBUTRIN XL) 150 MG 24 hr tablet; Take 1 tablet (150 mg) by mouth every morning  Dispense: 90 tablet; Refill: 1    5. Screening for diabetes mellitus  - Comprehensive metabolic panel (BMP + Alb, Alk Phos, ALT, AST, Total. Bili, TP); Future  - Comprehensive metabolic panel (BMP + Alb, Alk Phos, ALT, AST, Total. Bili, TP)    6. High priority for 2019-nCoV vaccine    - COVID-19,PF,PFIZER BOOSTER BIVALENT 12+Yrs      Patient has been advised of split billing requirements and indicates understanding: Yes    COUNSELING:   Reviewed preventive health counseling, as reflected in patient instructions    Estimated body mass index is 28.5 kg/m  as calculated from the following:    Height as of this encounter: 1.753 m (5' 9\").    Weight as of this encounter: 87.5 kg (193 lb).     Weight management plan: Discussed healthy diet and exercise guidelines    He reports that he has never smoked. He has never used smokeless tobacco.      Counseling Resources:  ATP IV Guidelines  Pooled Cohorts Equation Calculator  FRAX Risk Assessment  ICSI Preventive Guidelines  Dietary Guidelines for Americans, 2010  USDA's MyPlate  ASA Prophylaxis  Lung CA Screening    Mark Riojas [facility]  "

## 2022-10-13 LAB — TESTOST SERPL-MCNC: 284 NG/DL (ref 240–950)

## 2022-10-16 DIAGNOSIS — D64.9 ANEMIA, UNSPECIFIED TYPE: Primary | ICD-10-CM

## 2022-10-21 DIAGNOSIS — I42.9 SECONDARY CARDIOMYOPATHY (H): ICD-10-CM

## 2022-10-21 RX ORDER — LOSARTAN POTASSIUM 25 MG/1
25 TABLET ORAL DAILY
Qty: 90 TABLET | Refills: 3 | Status: SHIPPED | OUTPATIENT
Start: 2022-10-21 | End: 2023-10-31

## 2022-10-24 DIAGNOSIS — I42.8 NONISCHEMIC CARDIOMYOPATHY (H): ICD-10-CM

## 2022-10-24 RX ORDER — METOPROLOL SUCCINATE 25 MG/1
25 TABLET, EXTENDED RELEASE ORAL DAILY
Qty: 90 TABLET | Refills: 3 | Status: SHIPPED | OUTPATIENT
Start: 2022-10-24 | End: 2023-11-09

## 2022-12-04 DIAGNOSIS — M54.41 LOW BACK PAIN WITH RIGHT-SIDED SCIATICA, UNSPECIFIED BACK PAIN LATERALITY, UNSPECIFIED CHRONICITY: ICD-10-CM

## 2022-12-06 RX ORDER — CYCLOBENZAPRINE HCL 10 MG
TABLET ORAL
Qty: 30 TABLET | Refills: 0 | Status: SHIPPED | OUTPATIENT
Start: 2022-12-06 | End: 2023-12-27

## 2022-12-06 NOTE — TELEPHONE ENCOUNTER
Routing refill request to provider for review/approval because:  Drug not on the [facility] refill protocol   Adelaide Thompson RN, BSN

## 2022-12-19 DIAGNOSIS — E78.5 HYPERLIPIDEMIA LDL GOAL <130: ICD-10-CM

## 2022-12-19 RX ORDER — ROSUVASTATIN CALCIUM 20 MG/1
20 TABLET, COATED ORAL DAILY
Qty: 90 TABLET | Refills: 2 | Status: SHIPPED | OUTPATIENT
Start: 2022-12-19 | End: 2023-11-09

## 2022-12-19 NOTE — TELEPHONE ENCOUNTER
Refill approved per [facility] refill protocol.  Per 10/11 result note continue medication    Tracie CASTRO RN   [facility]

## 2023-04-20 ENCOUNTER — E-VISIT (OUTPATIENT)
Dept: FAMILY MEDICINE | Facility: CLINIC | Age: 38
End: 2023-04-20
Payer: COMMERCIAL

## 2023-04-20 DIAGNOSIS — C41.9 EWING'S SARCOMA (H): ICD-10-CM

## 2023-04-20 DIAGNOSIS — M54.41 LOW BACK PAIN WITH RIGHT-SIDED SCIATICA, UNSPECIFIED BACK PAIN LATERALITY, UNSPECIFIED CHRONICITY: Primary | ICD-10-CM

## 2023-04-20 DIAGNOSIS — M79.651 PAIN OF RIGHT THIGH: ICD-10-CM

## 2023-04-20 PROCEDURE — 99421 OL DIG E/M SVC 5-10 MIN: CPT | Performed by: FAMILY MEDICINE

## 2023-04-21 RX ORDER — PREDNISONE 20 MG/1
TABLET ORAL
Qty: 20 TABLET | Refills: 0 | Status: SHIPPED | OUTPATIENT
Start: 2023-04-21

## 2023-04-21 NOTE — PATIENT INSTRUCTIONS
Caring for Your Back    You are not alone.    Low back pain is very common. Nearly half of all adults will have low back pain in any given year. The good news is that back pain is rarely a danger to your health. Most people can manage their back pain on their own. About half of people start feeling better within two weeks. In 9 out of 10 cases, low back pain goes away or no longer limits daily activity within 6 weeks.     Your outlook is good!     Your symptoms tell us that your low back pain is most likely not a danger to you. Most of the time we will not know the exact cause of low back pain, even if you see a doctor or have an MRI. However, treatment can still work without knowing the cause of the pain. Less than 1 in 100 people need surgery for their back pain.     What can I do about my low back pain?     There are three basic things you can do to ease low back pain and help it go away.     Use heat or cold packs.    Take medicine as directed.    Use positions, movements and exercises.    Using heat or cold packs:    Try cold packs or gentle heat to ease your pain.  Use whichever gives you the most relief. Apply the cold pack or heat for 15 minutes at a time, as often as needed.    Taking medicine:    If taking over-the-counter medicine:    Take ibuprofen (Advil, Motrin) 600 mg three times a day as needed for pain.  OR    Take Aleve (naproxen) 220 to 440 mg two times a day as needed for pain. If your doctor prescribed a muscle relaxant (cyclobenzaprine 10 mg.):    Take   to 1 tablet at bedtime.    Do not drive when taking this medicine. This drug may make you sleepy.     Using positions, movements and exercises:    Research tells us that moving your joints and muscles can help you recover from back pain. Such activity should be simple and gentle. Use the positions below as well as walking to help relieve your pain. Try taking a short walk every 3 to 4 hours during the day. Walk for a few minutes inside your  home or take longer walks outside, on a treadmill or at a mall. Slowly increase the amount of time you walk. Expect discomfort when you begin, but it should lessen as your back starts to heal. When your back feels better, walk daily to keep your back and body healthy.    Finding a position that is comfortable:    When your back pain is new, certain positions will ease your pain. Gently try each of the positions below until you find one that is helpful. Once you find a position of comfort, use it as often as you like when you are resting. You will recover faster if you combine rest with activity.    * Lie on your back with your legs bent. You can do this by placing a pillow under your knees or lie on the floor and rest your lower legs on the seat of a chair.  * Lie on your side with your knees bent and place a pillow between your knees.    Lie on your stomach over pillows.       When should I call my doctor?    Your back pain should improve over the first couple of weeks. As it improves, you should be able to return to your normal activities.  But call your doctor if:      You have a sudden change in your ability to control your bladder or bowels.    You begin to feel tingling in your groin or legs.    The pain spreads down your leg and into your foot.    Your toes, feet or leg muscles begin to feel weak.    You feel generally unwell or sick.    Your pain gets worse.    If you are deaf or hard of hearing, please let us know. We provide many free services including sign language interpreters, oral interpreters, TTYs, telephone amplifiers, note takers and written materials.    For informational purposes only. Not to replace the advice of your health care provider. Copyright   2013 [facility]. All rights reserved. "Mobile Location, IP" 550401 - 04/13.    Mini Relaxation Exercises  Source www.tobaccofreeu.org    Mini relaxation exercises are focused breathing techniques that help reduce  anxiety and tension  immediately. You can do them with your eyes open or  closed. You can do them any place, at any time; no one will know that you are  doing them.    Good Times to Do a  Mini     Before taking an exam    While at the computer lab waiting for your document to print    While waiting in line    When someone says something that bothers you    While waiting for the announcement of a grade    While waiting for a phone call    In the dentist s chair    When you feel overwhelmed by what you need to accomplish in the near  future    When in pain    When you want to     Mini Version 1- Breath Focused  Position yourself in a supportive comfortable chair or lying in a position that is least painful. (Often this is on your back with pillows under your knees)    a. Count very slowly to yourself from ten down to zero, one number for  each breath. Thus, with the first diaphragmatic breath, you say  ten  to  yourself, with the next breath, you say  nine , etc. If you start feeling  light-headed or dizzy, slow down the counting. When you get to  zero ,  see how you are feeling. If you are feeling better, great! If not, try to  do it again.    b. As you inhale, count very slowly up to four; as you exhale, count slowly  back down to one. Thus, as you inhale, you say to yourself  one, two,  three, four , as you exhale, you say to yourself  four, three, two, one .  Do this several times.    c. After each inhalation, pause for a few seconds; after you exhale, pause  again for a few seconds. Do this for several breaths.    Mini Version 2- Physical Focused  a. Massage your forehead, jaw, back of neck, and shoulders  b. Stretch and yawn  c. Walk counting four paces as you breathe in and four paces as you  breathe out  d. Coordinate your breath with any activity, for example, writing, jogging,  drawing, lifting weights, walking, etc.    Mini Version 3- Imagery Focused  Position yourself in a supportive comfortable chair or lying in a position  that is least painful. (Often this is on your back with pillows under your knees)    a. Briefly picture yourself in a place you find relaxing  b. Contemplate a picture of a natural scene  c. Imagine the characteristics of one of the following or any other object  which portrays characteristics you find calming or supportive in the  moment:    Tree (strong, rooted, expansive)    Mountain (timeless, strong, stable)    Waves (fluid, limitless)    Sun (radiant, warm)    Wind (free)    Mini Version 4- Mindfulness Focused  a. Look out the window for a few moments  b. Notice something new  c. Listen. What is the most distant sound you hear? The next distant?  d. Appreciate the sensation of being in the situation, the feel, smell, sight of  certain objects  e. Focus on being exactly where you are, keeping your thoughts from flowing  into the future or past

## 2023-06-29 DIAGNOSIS — F33.0 MAJOR DEPRESSIVE DISORDER, RECURRENT EPISODE, MILD (H): ICD-10-CM

## 2023-07-03 RX ORDER — BUPROPION HYDROCHLORIDE 150 MG/1
150 TABLET ORAL EVERY MORNING
Qty: 90 TABLET | Refills: 1 | Status: SHIPPED | OUTPATIENT
Start: 2023-07-03 | End: 2024-01-19

## 2023-07-03 NOTE — TELEPHONE ENCOUNTER
Routing refill request to provider for review/approval because:  PHQ    PHQ-9 score:        10/11/2022    10:05 AM   PHQ   PHQ-9 Total Score 6   Q9: Thoughts of better off dead/self-harm past 2 weeks Not at all         1/22/2022     1:04 PM   FABIAN-7 SCORE   Total Score 7 (mild anxiety)   Total Score 7       ASHLEY HOLT RN on 7/3/2023 at 1:29 PM   [facility]

## 2023-10-31 ENCOUNTER — TELEPHONE (OUTPATIENT)
Dept: CARDIOLOGY | Facility: CLINIC | Age: 38
End: 2023-10-31
Payer: COMMERCIAL

## 2023-10-31 DIAGNOSIS — I42.9 SECONDARY CARDIOMYOPATHY (H): ICD-10-CM

## 2023-10-31 RX ORDER — LOSARTAN POTASSIUM 25 MG/1
25 TABLET ORAL DAILY
Qty: 90 TABLET | Refills: 0 | Status: SHIPPED | OUTPATIENT
Start: 2023-10-31 | End: 2023-11-09

## 2023-10-31 NOTE — TELEPHONE ENCOUNTER
Infant received on radiant warmer platform under intensive phototherapy as per protocol, heat element off on warmer. Infant nested on photoblanket, repositioned as tolerated.  Formula given with wakefulness each feed as appropriate, attempting to keep and s [facility] Refill Guideline reviewed.  Medication meets criteria for refill.

## 2023-11-07 ENCOUNTER — TELEPHONE (OUTPATIENT)
Dept: SLEEP MEDICINE | Facility: CLINIC | Age: 38
End: 2023-11-07
Payer: COMMERCIAL

## 2023-11-07 NOTE — TELEPHONE ENCOUNTER
Patient called and wanted to have repeat study done to make sure his oral appliance was adequetely treating his apnea.  LOV was 9/2020, so he was scheduled for a visit 12/15/23 to see Dr. Oh.

## 2023-11-09 ENCOUNTER — LAB (OUTPATIENT)
Dept: LAB | Facility: CLINIC | Age: 38
End: 2023-11-09
Payer: COMMERCIAL

## 2023-11-09 ENCOUNTER — OFFICE VISIT (OUTPATIENT)
Dept: CARDIOLOGY | Facility: CLINIC | Age: 38
End: 2023-11-09
Payer: COMMERCIAL

## 2023-11-09 VITALS
HEART RATE: 95 BPM | OXYGEN SATURATION: 96 % | WEIGHT: 211.3 LBS | SYSTOLIC BLOOD PRESSURE: 138 MMHG | DIASTOLIC BLOOD PRESSURE: 88 MMHG | BODY MASS INDEX: 31.29 KG/M2 | HEIGHT: 69 IN

## 2023-11-09 DIAGNOSIS — E78.5 HYPERLIPIDEMIA LDL GOAL <130: ICD-10-CM

## 2023-11-09 DIAGNOSIS — I42.9 SECONDARY CARDIOMYOPATHY (H): ICD-10-CM

## 2023-11-09 DIAGNOSIS — I42.8 NONISCHEMIC CARDIOMYOPATHY (H): ICD-10-CM

## 2023-11-09 PROCEDURE — 93000 ELECTROCARDIOGRAM COMPLETE: CPT | Performed by: INTERNAL MEDICINE

## 2023-11-09 PROCEDURE — 36415 COLL VENOUS BLD VENIPUNCTURE: CPT | Performed by: INTERNAL MEDICINE

## 2023-11-09 PROCEDURE — 80061 LIPID PANEL: CPT | Performed by: INTERNAL MEDICINE

## 2023-11-09 PROCEDURE — 99214 OFFICE O/P EST MOD 30 MIN: CPT | Performed by: INTERNAL MEDICINE

## 2023-11-09 RX ORDER — METOPROLOL SUCCINATE 25 MG/1
25 TABLET, EXTENDED RELEASE ORAL DAILY
Qty: 90 TABLET | Refills: 3 | Status: SHIPPED | OUTPATIENT
Start: 2023-11-09 | End: 2023-12-15

## 2023-11-09 RX ORDER — ROSUVASTATIN CALCIUM 20 MG/1
20 TABLET, COATED ORAL DAILY
Qty: 90 TABLET | Refills: 3 | Status: SHIPPED | OUTPATIENT
Start: 2023-11-09

## 2023-11-09 RX ORDER — LOSARTAN POTASSIUM 25 MG/1
25 TABLET ORAL DAILY
Qty: 90 TABLET | Refills: 3 | Status: SHIPPED | OUTPATIENT
Start: 2023-11-09

## 2023-11-09 NOTE — LETTER
11/9/2023    Mark Riojas, DO  [address] [facility] [address]    RE: Agapito Yu       Dear Colleague,     I had the pleasure of seeing Agapito uY in the [facility].  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  His ejection fraction has been around 40 to 45% with mild global hypokinesis.  He is here for a 2-year follow-up today.  He has been having some intermittent aching in the left side of his chest not necessarily related to exertion, he does feel spicy food and alcohol can sometimes trigger it although he does not believe it is related to acid reflux.  He has been drinking excessively sixpack 3-4 times a week and realizes he needs to cut back.  He denies any difficulty with his breathing, lightheadedness dizziness or syncopal episodes.  We did perform an electrocardiogram in office today which demonstrates a normal sinus rhythm with nonspecific T wave abnormality seen diffusely  He is on rosuvastatin for hyperlipidemia and his last cholesterol measurement last year showed total cholesterol 179 HDL 59 LDL 77 and triglycerides 213, this is down from 420 with his previous study.    Summary    1.  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  I encouraged him to cut back on his alcohol intake  Renewed his prescription for metoprolol and losartan today  2.  Mixed hyperlipidemia-recommend rechecking his cholesterol today although he is not fasting.  I did renew his prescriptions including rosuvastatin today     I be happy to continue to follow him every 2 years or as needed, please feel free to contact me with any questions you have in regards to his care    Today's clinic visit entailed:  Review of external notes as  documented elsewhere in note  Review of the result(s) of each unique test - lipids ECG  Ordering of each unique test    Provider  Link to MDM Help Grid     The level of medical decision making during this visit was of moderate complexity.    Orders Placed This Encounter   Procedures    Lipid Profile    EKG 12-lead complete w/read - Clinics (performed today)    Echocardiogram Complete     Orders Placed This Encounter   Medications    losartan (COZAAR) 25 MG tablet     Sig: Take 1 tablet (25 mg) by mouth daily Appointment required for further refills     Dispense:  90 tablet     Refill:  3    metoprolol succinate ER (TOPROL XL) 25 MG 24 hr tablet     Sig: Take 1 tablet (25 mg) by mouth daily     Dispense:  90 tablet     Refill:  3    rosuvastatin (CRESTOR) 20 MG tablet     Sig: Take 1 tablet (20 mg) by mouth daily     Dispense:  90 tablet     Refill:  3     Medications Discontinued During This Encounter   Medication Reason    metoprolol succinate ER (TOPROL XL) 25 MG 24 hr tablet Reorder (No AVS)    rosuvastatin (CRESTOR) 20 MG tablet Reorder (No AVS)    losartan (COZAAR) 25 MG tablet Reorder (No AVS)         Encounter Diagnoses   Name Primary?    Secondary cardiomyopathy (H)     Nonischemic cardiomyopathy (H)     Hyperlipidemia LDL goal <130        CURRENT MEDICATIONS:  Current Outpatient Medications   Medication Sig Dispense Refill    buPROPion (WELLBUTRIN XL) 150 MG 24 hr tablet Take 1 tablet (150 mg) by mouth every morning 90 tablet 1    cyclobenzaprine (FLEXERIL) 10 MG tablet TAKE ONE-HALF TO ONE TABLET BY MOUTH THREE TIMES A DAY AS NEEDED FOR MUSCLE SPASMS 30 tablet 0    losartan (COZAAR) 25 MG tablet Take 1 tablet (25 mg) by mouth daily Appointment required for further refills 90 tablet 3    metoprolol succinate ER (TOPROL XL) 25 MG 24 hr tablet Take 1 tablet (25 mg) by mouth daily 90 tablet 3    predniSONE (DELTASONE) 20 MG tablet Take 3 tabs by mouth daily x 3 days, then 2 tabs daily x 3 days, then 1 tab  daily x 3 days, then 1/2 tab daily x 3 days. 20 tablet 0    rosuvastatin (CRESTOR) 20 MG tablet Take 1 tablet (20 mg) by mouth daily 90 tablet 3    tadalafil (CIALIS) 10 MG tablet TAKE ONE TABLET BY MOUTH EVERY DAY AS NEEDED FOR ERECTILE DYSFUNCTION 30 tablet 1       ALLERGIES     Allergies   Allergen Reactions    Diphenhydramine      Only when mixed with Penicillin - gets hyperactive  Elevated heart rate        PAST MEDICAL HISTORY:  Past Medical History:   Diagnosis Date    Chang's sarcoma (H) 1997    [place] - Rt Hip    Hyperlipidemia LDL goal <130     Major depressive disorder, recurrent episode, mild (H24) 12/8/2016    Mild depression     Nonischemic cardiomyopathy (H)     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 at age 12 - chang's - Dr Hernandez - EF = 45%       PAST SURGICAL HISTORY:  Past Surgical History:   Procedure Laterality Date    DENTAL SURGERY  2001    wisdom teeth    SURGICAL HISTORY OF -   1997    Rt Hip/Ischium/Testicle removal - Chang's       FAMILY HISTORY:  Family History   Problem Relation Age of Onset    Hyperlipidemia Father     Gout Father     Hyperlipidemia Brother     Gout Brother     Colon Cancer Paternal Grandmother     Hyperlipidemia Brother     Gout Brother     Mental Illness Other         Suicide / Bi-polar       SOCIAL HISTORY:  Social History     Socioeconomic History    Marital status:      Spouse name: Toshia    Number of children: 0    Years of education: None    Highest education level: None   Tobacco Use    Smoking status: Never    Smokeless tobacco: Never   Substance and Sexual Activity    Alcohol use: Yes     Alcohol/week: 12.0 - 18.0 standard drinks of alcohol     Types: 12 - 18 Standard drinks or equivalent per week     Comment: 18-24 drinks per week  generally Thursday-Saturday    Drug use: Yes     Types: Marijuana     Comment: marijuana - weekly    Sexual activity: Yes     Partners: Female     Birth control/protection: None   Other Topics Concern    Parent/sibling w/ CABG, MI or  "angioplasty before 65F 55M? No       Review of Systems:  Skin:        Eyes:       ENT:       Respiratory:  Positive for sleep apnea  Cardiovascular:    chest pain;Positive for  Gastroenterology:      Genitourinary:       Musculoskeletal:       Neurologic:       Psychiatric:       Heme/Lymph/Imm:       Endocrine:         Physical Exam:  Vitals: /88 (BP Location: Right arm, Patient Position: Sitting, Cuff Size: Adult Large)   Pulse 95   Ht 1.753 m (5' 9\")   Wt 95.8 kg (211 lb 4.8 oz)   SpO2 96%   BMI 31.20 kg/m      Constitutional:  cooperative, alert and oriented, well developed, well nourished, in no acute distress        Skin:  warm and dry to the touch          Head:  normocephalic, no masses or lesions        Eyes:           Lymph:      ENT:           Neck:  carotid pulses are full and equal bilaterally        Respiratory:  normal symmetry         Cardiac: regular rhythm                                                         GI:  abdomen soft;no bruits        Extremities and Muscular Skeletal:  no edema              Neurological:  no gross motor deficits;affect appropriate        Psych:  Alert and Oriented x 3        Recent Lab Results:  LIPID RESULTS:  Lab Results   Component Value Date    CHOL 179 10/11/2022    CHOL 237 (H) 08/17/2020    HDL 59 10/11/2022    HDL 52 08/17/2020    LDL 77 10/11/2022    LDL  08/17/2020     Cannot estimate LDL when triglyceride exceeds 400 mg/dL     (H) 08/17/2020    TRIG 213 (H) 10/11/2022    TRIG 420 (H) 08/17/2020       LIVER ENZYME RESULTS:  Lab Results   Component Value Date    AST 22 10/11/2022    AST 24 06/28/2018    ALT 35 10/11/2022    ALT 47 08/17/2020       CBC RESULTS:  Lab Results   Component Value Date    WBC 5.5 10/11/2022    WBC 5.0 08/17/2020    RBC 4.04 (L) 10/11/2022    RBC 4.14 (L) 08/17/2020    HGB 13.1 (L) 10/11/2022    HGB 13.3 08/17/2020    HCT 37.4 (L) 10/11/2022    HCT 39.1 (L) 08/17/2020    MCV 93 10/11/2022    MCV 94 08/17/2020    MCH " "32.4 10/11/2022    MCH 32.1 08/17/2020    MCHC 35.0 10/11/2022    MCHC 34.0 08/17/2020    RDW 12.4 10/11/2022    RDW 12.6 08/17/2020     10/11/2022     08/17/2020       BMP RESULTS:  Lab Results   Component Value Date     10/11/2022     08/17/2020    POTASSIUM 3.8 10/11/2022    POTASSIUM 4.3 08/17/2020    CHLORIDE 107 10/11/2022    CHLORIDE 106 08/17/2020    CO2 23 10/11/2022    CO2 19 (L) 08/17/2020    ANIONGAP 7 10/11/2022    ANIONGAP 12 08/17/2020    GLC 96 10/11/2022    GLC 99 08/17/2020    BUN 22 10/11/2022    BUN 16 08/17/2020    CR 1.01 10/11/2022    CR 0.96 08/17/2020    GFRESTIMATED >90 10/11/2022    GFRESTIMATED >90 08/17/2020    GFRESTBLACK >90 08/17/2020    SHAUN 9.9 10/11/2022    SHAUN 9.1 08/17/2020        A1C RESULTS:  Lab Results   Component Value Date    A1C 5.3 06/28/2018       INR RESULTS:  No results found for: \"INR\"        CC  No referring provider defined for this encounter.      Thank you for allowing me to participate in the care of your patient.      Sincerely,     Maia Hernandez DO     [facility]  "

## 2023-11-09 NOTE — PROGRESS NOTES
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  His ejection fraction has been around 40 to 45% with mild global hypokinesis.  He is here for a 2-year follow-up today.  He has been having some intermittent aching in the left side of his chest not necessarily related to exertion, he does feel spicy food and alcohol can sometimes trigger it although he does not believe it is related to acid reflux.  He has been drinking excessively sixpack 3-4 times a week and realizes he needs to cut back.  He denies any difficulty with his breathing, lightheadedness dizziness or syncopal episodes.  We did perform an electrocardiogram in office today which demonstrates a normal sinus rhythm with nonspecific T wave abnormality seen diffusely  He is on rosuvastatin for hyperlipidemia and his last cholesterol measurement last year showed total cholesterol 179 HDL 59 LDL 77 and triglycerides 213, this is down from 420 with his previous study.    Summary    1.  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  I encouraged him to cut back on his alcohol intake  Renewed his prescription for metoprolol and losartan today  2.  Mixed hyperlipidemia-recommend rechecking his cholesterol today although he is not fasting.  I did renew his prescriptions including rosuvastatin today     I be happy to continue to follow him every 2 years or as needed, please feel free to contact me with any questions you have in regards to his care    Today's clinic visit entailed:  Review of external notes as documented elsewhere in note  Review of the result(s) of each unique test - lipids ECG  Ordering of each unique test    Provider  Link to [facility] Help Grid     The level of medical decision making during this visit  was of moderate complexity.    Orders Placed This Encounter   Procedures    Lipid Profile    EKG 12-lead complete w/read - Clinics (performed today)    Echocardiogram Complete     Orders Placed This Encounter   Medications    losartan (COZAAR) 25 MG tablet     Sig: Take 1 tablet (25 mg) by mouth daily Appointment required for further refills     Dispense:  90 tablet     Refill:  3    metoprolol succinate ER (TOPROL XL) 25 MG 24 hr tablet     Sig: Take 1 tablet (25 mg) by mouth daily     Dispense:  90 tablet     Refill:  3    rosuvastatin (CRESTOR) 20 MG tablet     Sig: Take 1 tablet (20 mg) by mouth daily     Dispense:  90 tablet     Refill:  3     Medications Discontinued During This Encounter   Medication Reason    metoprolol succinate ER (TOPROL XL) 25 MG 24 hr tablet Reorder (No AVS)    rosuvastatin (CRESTOR) 20 MG tablet Reorder (No AVS)    losartan (COZAAR) 25 MG tablet Reorder (No AVS)         Encounter Diagnoses   Name Primary?    Secondary cardiomyopathy (H)     Nonischemic cardiomyopathy (H)     Hyperlipidemia LDL goal <130        CURRENT MEDICATIONS:  Current Outpatient Medications   Medication Sig Dispense Refill    buPROPion (WELLBUTRIN XL) 150 MG 24 hr tablet Take 1 tablet (150 mg) by mouth every morning 90 tablet 1    cyclobenzaprine (FLEXERIL) 10 MG tablet TAKE ONE-HALF TO ONE TABLET BY MOUTH THREE TIMES A DAY AS NEEDED FOR MUSCLE SPASMS 30 tablet 0    losartan (COZAAR) 25 MG tablet Take 1 tablet (25 mg) by mouth daily Appointment required for further refills 90 tablet 3    metoprolol succinate ER (TOPROL XL) 25 MG 24 hr tablet Take 1 tablet (25 mg) by mouth daily 90 tablet 3    predniSONE (DELTASONE) 20 MG tablet Take 3 tabs by mouth daily x 3 days, then 2 tabs daily x 3 days, then 1 tab daily x 3 days, then 1/2 tab daily x 3 days. 20 tablet 0    rosuvastatin (CRESTOR) 20 MG tablet Take 1 tablet (20 mg) by mouth daily 90 tablet 3    tadalafil (CIALIS) 10 MG tablet TAKE ONE TABLET BY MOUTH EVERY DAY  AS NEEDED FOR ERECTILE DYSFUNCTION 30 tablet 1       ALLERGIES     Allergies   Allergen Reactions    Diphenhydramine      Only when mixed with Penicillin - gets hyperactive  Elevated heart rate        PAST MEDICAL HISTORY:  Past Medical History:   Diagnosis Date    Chang's sarcoma (H) 1997    [place] - Rt Hip    Hyperlipidemia LDL goal <130     Major depressive disorder, recurrent episode, mild (H24) 12/8/2016    Mild depression     Nonischemic cardiomyopathy (H)     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 at age 12 - chang's - Dr Hernandez - EF = 45%       PAST SURGICAL HISTORY:  Past Surgical History:   Procedure Laterality Date    DENTAL SURGERY  2001    wisdom teeth    SURGICAL HISTORY OF -   1997    Rt Hip/Ischium/Testicle removal - Chang's       FAMILY HISTORY:  Family History   Problem Relation Age of Onset    Hyperlipidemia Father     Gout Father     Hyperlipidemia Brother     Gout Brother     Colon Cancer Paternal Grandmother     Hyperlipidemia Brother     Gout Brother     Mental Illness Other         Suicide / Bi-polar       SOCIAL HISTORY:  Social History     Socioeconomic History    Marital status:      Spouse name: Toshia    Number of children: 0    Years of education: None    Highest education level: None   Tobacco Use    Smoking status: Never    Smokeless tobacco: Never   Substance and Sexual Activity    Alcohol use: Yes     Alcohol/week: 12.0 - 18.0 standard drinks of alcohol     Types: 12 - 18 Standard drinks or equivalent per week     Comment: 18-24 drinks per week  generally Thursday-Saturday    Drug use: Yes     Types: Marijuana     Comment: marijuana - weekly    Sexual activity: Yes     Partners: Female     Birth control/protection: None   Other Topics Concern    Parent/sibling w/ CABG, MI or angioplasty before 65F 55M? No       Review of Systems:  Skin:        Eyes:       ENT:       Respiratory:  Positive for sleep apnea  Cardiovascular:    chest pain;Positive for  Gastroenterology:      Genitourinary:      "  Musculoskeletal:       Neurologic:       Psychiatric:       Heme/Lymph/Imm:       Endocrine:         Physical Exam:  Vitals: /88 (BP Location: Right arm, Patient Position: Sitting, Cuff Size: Adult Large)   Pulse 95   Ht 1.753 m (5' 9\")   Wt 95.8 kg (211 lb 4.8 oz)   SpO2 96%   BMI 31.20 kg/m      Constitutional:  cooperative, alert and oriented, well developed, well nourished, in no acute distress        Skin:  warm and dry to the touch          Head:  normocephalic, no masses or lesions        Eyes:           Lymph:      ENT:           Neck:  carotid pulses are full and equal bilaterally        Respiratory:  normal symmetry         Cardiac: regular rhythm                                                         GI:  abdomen soft;no bruits        Extremities and Muscular Skeletal:  no edema              Neurological:  no gross motor deficits;affect appropriate        Psych:  Alert and Oriented x 3        Recent Lab Results:  LIPID RESULTS:  Lab Results   Component Value Date    CHOL 179 10/11/2022    CHOL 237 (H) 08/17/2020    HDL 59 10/11/2022    HDL 52 08/17/2020    LDL 77 10/11/2022    LDL  08/17/2020     Cannot estimate LDL when triglyceride exceeds 400 mg/dL     (H) 08/17/2020    TRIG 213 (H) 10/11/2022    TRIG 420 (H) 08/17/2020       LIVER ENZYME RESULTS:  Lab Results   Component Value Date    AST 22 10/11/2022    AST 24 06/28/2018    ALT 35 10/11/2022    ALT 47 08/17/2020       CBC RESULTS:  Lab Results   Component Value Date    WBC 5.5 10/11/2022    WBC 5.0 08/17/2020    RBC 4.04 (L) 10/11/2022    RBC 4.14 (L) 08/17/2020    HGB 13.1 (L) 10/11/2022    HGB 13.3 08/17/2020    HCT 37.4 (L) 10/11/2022    HCT 39.1 (L) 08/17/2020    MCV 93 10/11/2022    MCV 94 08/17/2020    MCH 32.4 10/11/2022    MCH 32.1 08/17/2020    MCHC 35.0 10/11/2022    MCHC 34.0 08/17/2020    RDW 12.4 10/11/2022    RDW 12.6 08/17/2020     10/11/2022     08/17/2020       BMP RESULTS:  Lab Results   Component " "Value Date     10/11/2022     08/17/2020    POTASSIUM 3.8 10/11/2022    POTASSIUM 4.3 08/17/2020    CHLORIDE 107 10/11/2022    CHLORIDE 106 08/17/2020    CO2 23 10/11/2022    CO2 19 (L) 08/17/2020    ANIONGAP 7 10/11/2022    ANIONGAP 12 08/17/2020    GLC 96 10/11/2022    GLC 99 08/17/2020    BUN 22 10/11/2022    BUN 16 08/17/2020    CR 1.01 10/11/2022    CR 0.96 08/17/2020    GFRESTIMATED >90 10/11/2022    GFRESTIMATED >90 08/17/2020    GFRESTBLACK >90 08/17/2020    SHAUN 9.9 10/11/2022    SHAUN 9.1 08/17/2020        A1C RESULTS:  Lab Results   Component Value Date    A1C 5.3 06/28/2018       INR RESULTS:  No results found for: \"INR\"        CC  No referring provider defined for this encounter.                "

## 2023-11-10 ENCOUNTER — MYC MEDICAL ADVICE (OUTPATIENT)
Dept: FAMILY MEDICINE | Facility: CLINIC | Age: 38
End: 2023-11-10
Payer: COMMERCIAL

## 2023-11-10 ENCOUNTER — TELEPHONE (OUTPATIENT)
Dept: CARDIOLOGY | Facility: CLINIC | Age: 38
End: 2023-11-10
Payer: COMMERCIAL

## 2023-11-10 DIAGNOSIS — E78.5 HYPERLIPIDEMIA LDL GOAL <130: Primary | ICD-10-CM

## 2023-11-10 DIAGNOSIS — E78.1 HYPERTRIGLYCERIDEMIA: ICD-10-CM

## 2023-11-10 LAB
CHOLEST SERPL-MCNC: 207 MG/DL
HDLC SERPL-MCNC: 53 MG/DL
LDLC SERPL CALC-MCNC: ABNORMAL MG/DL
NONHDLC SERPL-MCNC: 154 MG/DL
TRIGL SERPL-MCNC: 617 MG/DL

## 2023-11-10 NOTE — TELEPHONE ENCOUNTER
Maia Hernandez, Nedra Thomas, RN  Caller: Unspecified (Today, 10:47 AM)  He needs to see his PMD and probably be on vascepa for his Tgs    Mychart message sen to patient.  Nedra Peacock, SANJUANITA on 11/10/2023 at 11:18 AM

## 2023-11-16 ENCOUNTER — TELEPHONE (OUTPATIENT)
Dept: FAMILY MEDICINE | Facility: CLINIC | Age: 38
End: 2023-11-16
Payer: COMMERCIAL

## 2023-11-16 RX ORDER — ICOSAPENT ETHYL 1 G/1
2 CAPSULE ORAL 2 TIMES DAILY WITH MEALS
Qty: 360 CAPSULE | Refills: 1 | Status: SHIPPED | OUTPATIENT
Start: 2023-11-16 | End: 2024-06-28

## 2023-11-16 NOTE — TELEPHONE ENCOUNTER
Prior Authorization Retail Medication Request    Medication/Dose: VASCEPA I GM from [facility]  Diagnosis and ICD code (if different than what is on RX):    New/renewal/insurance change PA/secondary ins. PA:  Previously Tried and Failed:    Rationale:      Insurance   Primary: [employer] [place]  Insurance ID:  [id number]     Secondary (if applicable):  Insurance ID:      Pharmacy Information (if different than what is on RX)  Name:  [facility]  Phone:  [phone]  Fax:[phone]

## 2023-11-21 NOTE — TELEPHONE ENCOUNTER
Prior Authorization Approval    Authorization Effective Date: 11/21/2023  Authorization Expiration Date: 11/21/2024  Medication: VASCEPA I GM from [facility]  Approved Dose/Quantity:     Reference #:     Insurance Company: [employer] - Phone [phone] Fax [phone]  Expected CoPay:       CoPay Card Available:      Foundation Assistance Needed:    Which Pharmacy is filling the prescription (Not needed for infusion/clinic administered): [facility] [id number]  [place], [place] - [address] [facility]  Pharmacy Notified:  yes  Patient Notified:  yes- Pharmacy will contact patient when ready to /ship

## 2023-11-21 NOTE — TELEPHONE ENCOUNTER
Patient messages saying needs prior authorization for medication. Please send to PA team.    Mark Riojas DO  11/21/2023 7:09 AM

## 2023-11-21 NOTE — TELEPHONE ENCOUNTER
Central Prior Authorization Team   Phone: [phone]    PA Initiation    Medication: VASCEPA I AUBREY from [facility]  Insurance Company: [employer] - Phone [phone] Fax [phone]  Pharmacy Filling the Rx: [facility]  [place], [place] - [address]  Filling Pharmacy Phone: [phone]  Filling Pharmacy Fax:    Start Date: 11/21/2023

## 2023-11-27 NOTE — TELEPHONE ENCOUNTER
PA has been started please see my chart message below from pt     Thank you     Stephanie Drew RN, BSN  [facility]

## 2023-11-29 NOTE — TELEPHONE ENCOUNTER
Future order placed to check CMP (liver function tests) while taking Vascepa.    Mark Riojas DO  11/28/2023 11:57 PM

## 2023-12-01 ENCOUNTER — TELEPHONE (OUTPATIENT)
Dept: CARDIOLOGY | Facility: CLINIC | Age: 38
End: 2023-12-01

## 2023-12-01 ENCOUNTER — HOSPITAL ENCOUNTER (OUTPATIENT)
Dept: CARDIOLOGY | Facility: CLINIC | Age: 38
Discharge: HOME | End: 2023-12-01
Attending: INTERNAL MEDICINE | Admitting: INTERNAL MEDICINE
Payer: COMMERCIAL

## 2023-12-01 DIAGNOSIS — I42.9 SECONDARY CARDIOMYOPATHY (H): ICD-10-CM

## 2023-12-01 DIAGNOSIS — I42.8 NONISCHEMIC CARDIOMYOPATHY (H): ICD-10-CM

## 2023-12-01 LAB — LVEF ECHO: NORMAL

## 2023-12-01 PROCEDURE — 255N000002 HC RX 255 OP 636: Performed by: INTERNAL MEDICINE

## 2023-12-01 PROCEDURE — 93306 TTE W/DOPPLER COMPLETE: CPT | Mod: 26 | Performed by: INTERNAL MEDICINE

## 2023-12-01 PROCEDURE — 999N000208 ECHOCARDIOGRAM COMPLETE

## 2023-12-01 RX ADMIN — HUMAN ALBUMIN MICROSPHERES AND PERFLUTREN 3 ML: 10; .22 INJECTION, SOLUTION INTRAVENOUS at 08:17

## 2023-12-01 NOTE — TELEPHONE ENCOUNTER
Please see echocardiogram result and advise, per last OV note 11/9/23:    1.  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  I encouraged him to cut back on his alcohol intake      Interpretation Summary     1. The left ventricle is normal in size. The visual ejection fraction is 35-  40%. There is moderate global hypokinesia of the left ventricle.  2. The right ventricle is normal in structure, function and size.  3. No valve disease.     Echo 10/2021 showed EF 40-45%.  Patient did update that he has not had a drink in one week.  Patient does state he continues to have a dull ache/pain in chest.      Nedra Peacock RN on 12/1/2023 at 11:28 AM

## 2023-12-06 NOTE — TELEPHONE ENCOUNTER
Maia Hernandez DO Lidke, Jen M, RN  Caller: Unspecified (5 days ago, 11:26 AM)  THis is within reader variability and likely not changed    Futuredermt message returned to patient.  Nedra Peacock, SANJUANITA on 12/6/2023 at 4:37 PM

## 2023-12-15 ENCOUNTER — MYC MEDICAL ADVICE (OUTPATIENT)
Dept: FAMILY MEDICINE | Facility: CLINIC | Age: 38
End: 2023-12-15

## 2023-12-15 ENCOUNTER — VIRTUAL VISIT (OUTPATIENT)
Dept: CARDIOLOGY | Facility: CLINIC | Age: 38
End: 2023-12-15
Payer: COMMERCIAL

## 2023-12-15 ENCOUNTER — VIRTUAL VISIT (OUTPATIENT)
Dept: SLEEP MEDICINE | Facility: CLINIC | Age: 38
End: 2023-12-15
Payer: COMMERCIAL

## 2023-12-15 VITALS — HEIGHT: 69 IN | BODY MASS INDEX: 31.84 KG/M2 | WEIGHT: 215 LBS

## 2023-12-15 DIAGNOSIS — I42.8 NONISCHEMIC CARDIOMYOPATHY (H): Primary | ICD-10-CM

## 2023-12-15 DIAGNOSIS — G47.33 OSA (OBSTRUCTIVE SLEEP APNEA): Primary | ICD-10-CM

## 2023-12-15 PROCEDURE — 99214 OFFICE O/P EST MOD 30 MIN: CPT | Mod: 95 | Performed by: INTERNAL MEDICINE

## 2023-12-15 PROCEDURE — 99203 OFFICE O/P NEW LOW 30 MIN: CPT | Mod: VID | Performed by: INTERNAL MEDICINE

## 2023-12-15 RX ORDER — METOPROLOL SUCCINATE 50 MG/1
50 TABLET, EXTENDED RELEASE ORAL DAILY
Qty: 30 TABLET | Refills: 11 | Status: SHIPPED | OUTPATIENT
Start: 2023-12-15

## 2023-12-15 ASSESSMENT — SLEEP AND FATIGUE QUESTIONNAIRES
HOW LIKELY ARE YOU TO NOD OFF OR FALL ASLEEP WHEN YOU ARE A PASSENGER IN A CAR FOR AN HOUR WITHOUT A BREAK: HIGH CHANCE OF DOZING
HOW LIKELY ARE YOU TO NOD OFF OR FALL ASLEEP IN A CAR, WHILE STOPPED FOR A FEW MINUTES IN TRAFFIC: WOULD NEVER DOZE
HOW LIKELY ARE YOU TO NOD OFF OR FALL ASLEEP WHILE LYING DOWN TO REST IN THE AFTERNOON WHEN CIRCUMSTANCES PERMIT: HIGH CHANCE OF DOZING
HOW LIKELY ARE YOU TO NOD OFF OR FALL ASLEEP WHILE WATCHING TV: SLIGHT CHANCE OF DOZING
HOW LIKELY ARE YOU TO NOD OFF OR FALL ASLEEP WHILE SITTING AND TALKING TO SOMEONE: WOULD NEVER DOZE
HOW LIKELY ARE YOU TO NOD OFF OR FALL ASLEEP WHILE SITTING AND READING: SLIGHT CHANCE OF DOZING
HOW LIKELY ARE YOU TO NOD OFF OR FALL ASLEEP WHILE SITTING INACTIVE IN A PUBLIC PLACE: SLIGHT CHANCE OF DOZING
HOW LIKELY ARE YOU TO NOD OFF OR FALL ASLEEP WHILE SITTING QUIETLY AFTER LUNCH WITHOUT ALCOHOL: SLIGHT CHANCE OF DOZING

## 2023-12-15 ASSESSMENT — PAIN SCALES - GENERAL: PAINLEVEL: NO PAIN (0)

## 2023-12-15 ASSESSMENT — PATIENT HEALTH QUESTIONNAIRE - PHQ9: SUM OF ALL RESPONSES TO PHQ QUESTIONS 1-9: 5

## 2023-12-15 NOTE — PROGRESS NOTES
Virtual Visit Details    Type of service:  Video Visit     Originating Location (pt. Location): Home    Distant Location (provider location):  On-site  Platform used for Video Visit: Chetan

## 2023-12-15 NOTE — PROGRESS NOTES
"Agapito is a 38 year old who is being evaluated via a billable telephone visit.    Vitals - Patient Reported  Diastolic (Patient Reported):  (N/A)  Weight (Patient Reported): 97.5 kg (215 lb)  Height (Patient Reported): 175.3 cm (5' 9\")  BMI (Based on Pt Reported Ht/Wt): 31.75  Pulse (Patient Reported): 96    Review Of Systems  Skin: NEGATIVE  Eyes:Ears/Nose/Throat: NEGATIVE  Respiratory: NEGATIVE  Cardiovascular: chest soreness, at rest 4-5X week, resting heart rate above 100, energy level normal  Gastrointestinal: NEGATIVE  Genitourinary:NEGATIVE   Musculoskeletal: NEGATIVE  Neurologic: NEGATIVE  Psychiatric: NEGATIVE  Hematologic/Lymphatic/Immunologic: NEGATIVE  Endocrine:  NEGATIVE    Nneka Jarrett LPN    What phone number would you like to be contacted at? [phone]  How would you like to obtain your AVS? MyChart    Distant Location (provider location):  On-site  Phone call duration: 15 minutes  "

## 2023-12-15 NOTE — LETTER
"12/15/2023    Mark Riojas, DO  [address] [facility] [address]    RE: Agapito Yu       Dear Colleague,     I had the pleasure of seeing Agapito Yu in the [facility].  Agapito is a 38 year old who is being evaluated via a billable telephone visit.    Vitals - Patient Reported  Diastolic (Patient Reported):  (N/A)  Weight (Patient Reported): 97.5 kg (215 lb)  Height (Patient Reported): 175.3 cm (5' 9\")  BMI (Based on Pt Reported Ht/Wt): 31.75  Pulse (Patient Reported): 96    Review Of Systems  Skin: NEGATIVE  Eyes:Ears/Nose/Throat: NEGATIVE  Respiratory: NEGATIVE  Cardiovascular: chest soreness, at rest 4-5X week, resting heart rate above 100, energy level normal  Gastrointestinal: NEGATIVE  Genitourinary:NEGATIVE   Musculoskeletal: NEGATIVE  Neurologic: NEGATIVE  Psychiatric: NEGATIVE  Hematologic/Lymphatic/Immunologic: NEGATIVE  Endocrine:  NEGATIVE    Nneka Jarrett LPN    What phone number would you like to be contacted at? [phone]  How would you like to obtain your AVS? 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  I saw him in November and had recommended repeating an echocardiogram to assess his LV function.  He admittedly was drinking excessive alcohol so I wanted to recheck his LV function.  The echocardiogram did show that his EF decreased from 40 to 45% down to 35 to 40%, however this is within Rieder variability.  I did review the images myself and felt that his overall LV systolic function had not changed significantly from previous study.  He has also been experiencing what he describes as a soreness in his chest.  He has drastically cut back on alcohol and trying to eat less spicy foods but continues to notice this.  He also has noted that his " heart rate is fast typically in the low 100s at rest.  He is on Wellbutrin 150 mg daily for depression.    Summary    1.  Nonischemic cardiomyopathy-secondary to chemotherapy, on beta-blocker.  I did review the echocardiogram findings and feel that his overall LV systolic function has not changed significantly.  I still recommended continued reduce his alcohol intake which she has done.  He would like to reassess his LV function by echocardiogram in a few months which I have ordered.    2.  Tachycardia-likely related to chronic Wellbutrin use.  He is very reluctant to changing his antidepressant as it is working very well for him, we discussed increasing his beta-blocker from 25 mg to 50 mg.  I gave him a new prescription to reflect this increase  I be happy to see him back annually or as needed, please feel free to contact me with any questions you have regards to his care         Today's clinic visit entailed:    30 minutes spent by me on the date of the encounter doing chart review, history and exam, documentation and further activities per the note  Provider  Link to [facility] Help Grid         Orders Placed This Encounter   Procedures    Echocardiogram Complete     Orders Placed This Encounter   Medications    metoprolol succinate ER (TOPROL XL) 50 MG 24 hr tablet     Sig: Take 1 tablet (50 mg) by mouth daily     Dispense:  30 tablet     Refill:  11     Medications Discontinued During This Encounter   Medication Reason    metoprolol succinate ER (TOPROL XL) 25 MG 24 hr tablet Reorder (No AVS)         Encounter Diagnosis   Name Primary?    Nonischemic cardiomyopathy (H) Yes       CURRENT MEDICATIONS:  Current Outpatient Medications   Medication Sig Dispense Refill    buPROPion (WELLBUTRIN XL) 150 MG 24 hr tablet Take 1 tablet (150 mg) by mouth every morning 90 tablet 1    cyclobenzaprine (FLEXERIL) 10 MG tablet TAKE ONE-HALF TO ONE TABLET BY MOUTH THREE TIMES A DAY AS NEEDED FOR MUSCLE SPASMS 30 tablet 0    icosapent  ethyl (VASCEPA) 1 g CAPS capsule Take 2 g by mouth 2 times daily (with meals) 360 capsule 1    losartan (COZAAR) 25 MG tablet Take 1 tablet (25 mg) by mouth daily Appointment required for further refills 90 tablet 3    predniSONE (DELTASONE) 20 MG tablet Take 3 tabs by mouth daily x 3 days, then 2 tabs daily x 3 days, then 1 tab daily x 3 days, then 1/2 tab daily x 3 days. (Patient taking differently: Take 3 tabs by mouth daily x 3 days, then 2 tabs daily x 3 days, then 1 tab daily x 3 days, then 1/2 tab daily x 3 days. PRN) 20 tablet 0    rosuvastatin (CRESTOR) 20 MG tablet Take 1 tablet (20 mg) by mouth daily 90 tablet 3    tadalafil (CIALIS) 10 MG tablet TAKE ONE TABLET BY MOUTH EVERY DAY AS NEEDED FOR ERECTILE DYSFUNCTION 30 tablet 1    metoprolol succinate ER (TOPROL XL) 50 MG 24 hr tablet Take 1 tablet (50 mg) by mouth daily 30 tablet 11       ALLERGIES     Allergies   Allergen Reactions    Diphenhydramine      Only when mixed with Penicillin - gets hyperactive  Elevated heart rate        PAST MEDICAL HISTORY:  Past Medical History:   Diagnosis Date    Chang's sarcoma (H) 1997    [place] - Rt Hip    Hyperlipidemia LDL goal <130     Major depressive disorder, recurrent episode, mild (H24) 12/8/2016    Mild depression     Nonischemic cardiomyopathy (H)     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 at age 12 - chang's - Dr Hernandez - EF = 45%       PAST SURGICAL HISTORY:  Past Surgical History:   Procedure Laterality Date    DENTAL SURGERY  2001    wisdom teeth    SURGICAL HISTORY OF -   1997    Rt Hip/Ischium/Testicle removal - Chang's       FAMILY HISTORY:  Family History   Problem Relation Age of Onset    Hyperlipidemia Father     Gout Father     Hyperlipidemia Brother     Gout Brother     Colon Cancer Paternal Grandmother     Hyperlipidemia Brother     Gout Brother     Mental Illness Other         Suicide / Bi-polar       SOCIAL HISTORY:  Social History     Socioeconomic History    Marital status:      Spouse name: Toshia     Number of children: 0   Tobacco Use    Smoking status: Never    Smokeless tobacco: Never   Substance and Sexual Activity    Alcohol use: Yes     Alcohol/week: 12.0 - 18.0 standard drinks of alcohol     Types: 12 - 18 Standard drinks or equivalent per week     Comment: 18-24 drinks per week  generally Thursday-Saturday    Drug use: Yes     Types: Marijuana     Comment: marijuana - weekly    Sexual activity: Yes     Partners: Female     Birth control/protection: None   Other Topics Concern    Parent/sibling w/ CABG, MI or angioplasty before 65F 55M? No         Recent Lab Results:  LIPID RESULTS:  Lab Results   Component Value Date    CHOL 207 (H) 11/09/2023    CHOL 237 (H) 08/17/2020    HDL 53 11/09/2023    HDL 52 08/17/2020    LDL  11/09/2023      Comment:      Cannot estimate LDL when triglyceride exceeds 400 mg/dL    LDL  08/17/2020     Cannot estimate LDL when triglyceride exceeds 400 mg/dL     (H) 08/17/2020    TRIG 617 (H) 11/09/2023    TRIG 420 (H) 08/17/2020       LIVER ENZYME RESULTS:  Lab Results   Component Value Date    AST 22 10/11/2022    AST 24 06/28/2018    ALT 35 10/11/2022    ALT 47 08/17/2020       CBC RESULTS:  Lab Results   Component Value Date    WBC 5.5 10/11/2022    WBC 5.0 08/17/2020    RBC 4.04 (L) 10/11/2022    RBC 4.14 (L) 08/17/2020    HGB 13.1 (L) 10/11/2022    HGB 13.3 08/17/2020    HCT 37.4 (L) 10/11/2022    HCT 39.1 (L) 08/17/2020    MCV 93 10/11/2022    MCV 94 08/17/2020    MCH 32.4 10/11/2022    MCH 32.1 08/17/2020    MCHC 35.0 10/11/2022    MCHC 34.0 08/17/2020    RDW 12.4 10/11/2022    RDW 12.6 08/17/2020     10/11/2022     08/17/2020       BMP RESULTS:  Lab Results   Component Value Date     10/11/2022     08/17/2020    POTASSIUM 3.8 10/11/2022    POTASSIUM 4.3 08/17/2020    CHLORIDE 107 10/11/2022    CHLORIDE 106 08/17/2020    CO2 23 10/11/2022    CO2 19 (L) 08/17/2020    ANIONGAP 7 10/11/2022    ANIONGAP 12 08/17/2020    GLC 96 10/11/2022     "GLC 99 08/17/2020    BUN 22 10/11/2022    BUN 16 08/17/2020    CR 1.01 10/11/2022    CR 0.96 08/17/2020    GFRESTIMATED >90 10/11/2022    GFRESTIMATED >90 08/17/2020    GFRESTBLACK >90 08/17/2020    SHAUN 9.9 10/11/2022    SHAUN 9.1 08/17/2020        A1C RESULTS:  Lab Results   Component Value Date    A1C 5.3 06/28/2018       INR RESULTS:  No results found for: \"INR\"        CC  No referring provider defined for this encounter.                    Thank you for allowing me to participate in the care of your patient.      Sincerely,     Maia Hernandez DO     [facility]  "

## 2023-12-15 NOTE — PROGRESS NOTES
Sleep Consultation:    Date on this visit: 12/15/2023    Agapito Yu  is referred by No ref. provider found for a sleep consultation.     Primary Physician: Mark Riojas     Agapito Yu presents to clinic for management of moderate obstructive sleep apnea.    7/24/2019 [facility] Sleep Study (201.0 lbs) - AHI 20.6, RDI 23.5, Supine AHI 52.4, REM AHI 32.9, Low O2 87.5%, Time Spent ?88% 0.1 minutes / Time Spent ?89% 0.2 minutes.     Patient was initially prescribed CPAP therapy but was intolerant of treatment.  He was subsequently referred for a dental appliance therapy through dental sleep medicine. Patient sleeps on his stomach and occasionally on his back.     Patient reports that dental appliance is working well. Snoring is resolved and he reports improvement in sleep quality and daytime alertness.     Agapito goes to sleep at 10:00 PM during the week. He wakes up at 7:00 AM. He falls asleep in 15 minutes.  Agapito denies difficulty falling asleep.  He wakes up 1-2 times a night for 10 minutes before falling back to sleep.  Agapito wakes up to go to the bathroom.  On weekends, Agapito goes to sleep at 11:00 PM.  He wakes up at 8:00 AM. He falls asleep in 15 minutes.  Patient gets an average of 7-8 hours of sleep per night.     Agapito has occasional sleep talking and denies any sleep walking, dream enactment, sleep paralysis, cataplexy, and hypnogogic/hypnopompic hallucinations.    Patient's Briscoe Sleepiness score 10/24 consistent with mild daytime sleepiness.      Agapito naps 1-2 times per week for  minutes, feels refreshed after naps. He takes no inadvertant naps.  He denies closing eyes, dozing, and falling asleep while driving.  Patient was counseled on the importance of driving while alert, to pull over if drowsy, or nap before getting into the vehicle if sleepy.      He uses 2 cups/day of coffee, 1 sodas/day.     Problem List:  Patient Active Problem List    Diagnosis Date Noted    Chronic neck  "pain 03/24/2022     Priority: Medium    YVONNE (obstructive sleep apnea) 07/28/2019     Priority: Medium    Nonischemic cardiomyopathy (H)      Priority: Medium     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 at age 12 - chang's - Dr Hernandez - EF = 45%      Chang's sarcoma (H)      Priority: Medium    Hyperlipidemia LDL goal <130      Priority: Medium    Major depressive disorder, recurrent episode, mild (H24) 12/08/2016     Priority: Medium    Familial hypercholesterolemia 05/08/2014     Priority: Medium    History of Chang's sarcoma 03/28/2014     Priority: Medium     Overview:   S/p removal.  In remission.           Past Medical/Surgical History:  Past Medical History:   Diagnosis Date    Chang's sarcoma (H) 1997    [facility]    Hyperlipidemia LDL goal <130     Major depressive disorder, recurrent episode, mild (H24) 12/8/2016    Mild depression     Nonischemic cardiomyopathy (H)     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 at age 12 - chang's - Dr Mary Goddard EF = 45%     Social History     Tobacco Use    Smoking status: Never    Smokeless tobacco: Never   Substance Use Topics    Alcohol use: Yes     Alcohol/week: 12.0 - 18.0 standard drinks of alcohol     Types: 12 - 18 Standard drinks or equivalent per week     Comment: 18-24 drinks per week  generally Thursday-Saturday    Drug use: Yes     Types: Marijuana     Comment: marijuana - weekly     Physical Examination:  Vitals: Ht 1.753 m (5' 9\")   Wt 97.5 kg (215 lb)   BMI 31.75 kg/m    BMI= Body mass index is 31.75 kg/m .  GENERAL APPEARANCE: healthy, alert, and no distress  NEURO: mentation intact and speech normal  PSYCH: mentation appears normal and affect normal/bright    Impression/Plan:    Obstructive sleep apnea    Patient is currently on oral appliance therapy for treatment of moderate obstructive sleep apnea due to previous intolerance of CPAP.  Oral appliance therapy is going very well.  He reports resolution of snoring and improvement in sleep quality and daytime functioning.  Previous " testing had shown supine predominance of sleep apnea events.  Patient mostly sleeps in the prone position with some portion on his back.  He was reminded regarding supine exacerbation of sleep apnea.  In order to assess response to oral appliance therapy, a home sleep test is recommended.    Plan:     Home sleep testing on dental appliance to assess response  Follow-up after home sleep study      Dr. Soto Oh       CC: No ref. provider found

## 2023-12-15 NOTE — NURSING NOTE
Is the patient currently in the state of [place]? YES    Visit mode:VIDEO    If the visit is dropped, the patient can be reconnected by: VIDEO VISIT: Send to e-mail at: [email or web address]    Will anyone else be joining the visit? NO  (If patient encounters technical issues they should call [phone])    How would you like to obtain your AVS? MyChart    Are changes needed to the allergy or medication list? Pt stated no changes to allergies and Pt stated no med changes    Reason for visit: Consult  Has patient had flu shot for current/most recent flu season? If so, when? No    Izzy PRETTY

## 2023-12-15 NOTE — PROGRESS NOTES
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  I saw him in November and had recommended repeating an echocardiogram to assess his LV function.  He admittedly was drinking excessive alcohol so I wanted to recheck his LV function.  The echocardiogram did show that his EF decreased from 40 to 45% down to 35 to 40%, however this is within Rieder variability.  I did review the images myself and felt that his overall LV systolic function had not changed significantly from previous study.  He has also been experiencing what he describes as a soreness in his chest.  He has drastically cut back on alcohol and trying to eat less spicy foods but continues to notice this.  He also has noted that his heart rate is fast typically in the low 100s at rest.  He is on Wellbutrin 150 mg daily for depression.    Summary    1.  Nonischemic cardiomyopathy-secondary to chemotherapy, on beta-blocker.  I did review the echocardiogram findings and feel that his overall LV systolic function has not changed significantly.  I still recommended continued reduce his alcohol intake which she has done.  He would like to reassess his LV function by echocardiogram in a few months which I have ordered.    2.  Tachycardia-likely related to chronic Wellbutrin use.  He is very reluctant to changing his antidepressant as it is working very well for him, we discussed increasing his beta-blocker from 25 mg to 50 mg.  I gave him a new prescription to reflect this increase  I be happy to see him back annually or as needed, please feel free to contact me with any questions you have regards to his care         Today's clinic visit entailed:    30 minutes spent by me on the date of the encounter doing chart review, history and exam, documentation and further activities per the note  Provider  Link to [facility] Help Grid         Orders Placed  This Encounter   Procedures    Echocardiogram Complete     Orders Placed This Encounter   Medications    metoprolol succinate ER (TOPROL XL) 50 MG 24 hr tablet     Sig: Take 1 tablet (50 mg) by mouth daily     Dispense:  30 tablet     Refill:  11     Medications Discontinued During This Encounter   Medication Reason    metoprolol succinate ER (TOPROL XL) 25 MG 24 hr tablet Reorder (No AVS)         Encounter Diagnosis   Name Primary?    Nonischemic cardiomyopathy (H) Yes       CURRENT MEDICATIONS:  Current Outpatient Medications   Medication Sig Dispense Refill    buPROPion (WELLBUTRIN XL) 150 MG 24 hr tablet Take 1 tablet (150 mg) by mouth every morning 90 tablet 1    cyclobenzaprine (FLEXERIL) 10 MG tablet TAKE ONE-HALF TO ONE TABLET BY MOUTH THREE TIMES A DAY AS NEEDED FOR MUSCLE SPASMS 30 tablet 0    icosapent ethyl (VASCEPA) 1 g CAPS capsule Take 2 g by mouth 2 times daily (with meals) 360 capsule 1    losartan (COZAAR) 25 MG tablet Take 1 tablet (25 mg) by mouth daily Appointment required for further refills 90 tablet 3    predniSONE (DELTASONE) 20 MG tablet Take 3 tabs by mouth daily x 3 days, then 2 tabs daily x 3 days, then 1 tab daily x 3 days, then 1/2 tab daily x 3 days. (Patient taking differently: Take 3 tabs by mouth daily x 3 days, then 2 tabs daily x 3 days, then 1 tab daily x 3 days, then 1/2 tab daily x 3 days. PRN) 20 tablet 0    rosuvastatin (CRESTOR) 20 MG tablet Take 1 tablet (20 mg) by mouth daily 90 tablet 3    tadalafil (CIALIS) 10 MG tablet TAKE ONE TABLET BY MOUTH EVERY DAY AS NEEDED FOR ERECTILE DYSFUNCTION 30 tablet 1    metoprolol succinate ER (TOPROL XL) 50 MG 24 hr tablet Take 1 tablet (50 mg) by mouth daily 30 tablet 11       ALLERGIES     Allergies   Allergen Reactions    Diphenhydramine      Only when mixed with Penicillin - gets hyperactive  Elevated heart rate        PAST MEDICAL HISTORY:  Past Medical History:   Diagnosis Date    Parker's sarcoma (H) 1997    [place] - Rt Hip     Hyperlipidemia LDL goal <130     Major depressive disorder, recurrent episode, mild (H24) 12/8/2016    Mild depression     Nonischemic cardiomyopathy (H)     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 at age 12 - chang's - Dr Hernandez - EF = 45%       PAST SURGICAL HISTORY:  Past Surgical History:   Procedure Laterality Date    DENTAL SURGERY  2001    wisdom teeth    SURGICAL HISTORY OF -   1997    Rt Hip/Ischium/Testicle removal - Chang's       FAMILY HISTORY:  Family History   Problem Relation Age of Onset    Hyperlipidemia Father     Gout Father     Hyperlipidemia Brother     Gout Brother     Colon Cancer Paternal Grandmother     Hyperlipidemia Brother     Gout Brother     Mental Illness Other         Suicide / Bi-polar       SOCIAL HISTORY:  Social History     Socioeconomic History    Marital status:      Spouse name: Toshia    Number of children: 0   Tobacco Use    Smoking status: Never    Smokeless tobacco: Never   Substance and Sexual Activity    Alcohol use: Yes     Alcohol/week: 12.0 - 18.0 standard drinks of alcohol     Types: 12 - 18 Standard drinks or equivalent per week     Comment: 18-24 drinks per week  generally Thursday-Saturday    Drug use: Yes     Types: Marijuana     Comment: marijuana - weekly    Sexual activity: Yes     Partners: Female     Birth control/protection: None   Other Topics Concern    Parent/sibling w/ CABG, MI or angioplasty before 65F 55M? No         Recent Lab Results:  LIPID RESULTS:  Lab Results   Component Value Date    CHOL 207 (H) 11/09/2023    CHOL 237 (H) 08/17/2020    HDL 53 11/09/2023    HDL 52 08/17/2020    LDL  11/09/2023      Comment:      Cannot estimate LDL when triglyceride exceeds 400 mg/dL    LDL  08/17/2020     Cannot estimate LDL when triglyceride exceeds 400 mg/dL     (H) 08/17/2020    TRIG 617 (H) 11/09/2023    TRIG 420 (H) 08/17/2020       LIVER ENZYME RESULTS:  Lab Results   Component Value Date    AST 22 10/11/2022    AST 24 06/28/2018    ALT 35 10/11/2022    ALT  "47 08/17/2020       CBC RESULTS:  Lab Results   Component Value Date    WBC 5.5 10/11/2022    WBC 5.0 08/17/2020    RBC 4.04 (L) 10/11/2022    RBC 4.14 (L) 08/17/2020    HGB 13.1 (L) 10/11/2022    HGB 13.3 08/17/2020    HCT 37.4 (L) 10/11/2022    HCT 39.1 (L) 08/17/2020    MCV 93 10/11/2022    MCV 94 08/17/2020    MCH 32.4 10/11/2022    MCH 32.1 08/17/2020    MCHC 35.0 10/11/2022    MCHC 34.0 08/17/2020    RDW 12.4 10/11/2022    RDW 12.6 08/17/2020     10/11/2022     08/17/2020       BMP RESULTS:  Lab Results   Component Value Date     10/11/2022     08/17/2020    POTASSIUM 3.8 10/11/2022    POTASSIUM 4.3 08/17/2020    CHLORIDE 107 10/11/2022    CHLORIDE 106 08/17/2020    CO2 23 10/11/2022    CO2 19 (L) 08/17/2020    ANIONGAP 7 10/11/2022    ANIONGAP 12 08/17/2020    GLC 96 10/11/2022    GLC 99 08/17/2020    BUN 22 10/11/2022    BUN 16 08/17/2020    CR 1.01 10/11/2022    CR 0.96 08/17/2020    GFRESTIMATED >90 10/11/2022    GFRESTIMATED >90 08/17/2020    GFRESTBLACK >90 08/17/2020    SHAUN 9.9 10/11/2022    SHAUN 9.1 08/17/2020        A1C RESULTS:  Lab Results   Component Value Date    A1C 5.3 06/28/2018       INR RESULTS:  No results found for: \"INR\"        CC  No referring provider defined for this encounter.                  "

## 2023-12-16 ENCOUNTER — HEALTH MAINTENANCE LETTER (OUTPATIENT)
Age: 38
End: 2023-12-16

## 2023-12-18 NOTE — TELEPHONE ENCOUNTER
Please see my chart message below     Please review and advise     Thank you     Stephanie Drew RN, BSN  [facility]

## 2023-12-22 ENCOUNTER — TELEPHONE (OUTPATIENT)
Dept: CARDIOLOGY | Facility: CLINIC | Age: 38
End: 2023-12-22
Payer: COMMERCIAL

## 2023-12-22 NOTE — TELEPHONE ENCOUNTER
Patient states that increase in Metoprolol has relieved his chest discomfort.  Patient states his resting HR continues to be in the 90's and easily goes over 100 with any activity.  RN has advised no caffeine.  Patient is aware Dr. Hernandez is out of office until 1/2/24 and will advise on her return.  Per last OV note:    2.  Tachycardia-likely related to chronic Wellbutrin use.  He is very reluctant to changing his antidepressant as it is working very well for him, we discussed increasing his beta-blocker from 25 mg to 50 mg.  I gave him a new prescription to reflect this increase     Nedra Peacock RN on 12/22/2023 at 9:43 AM

## 2023-12-26 DIAGNOSIS — M54.41 LOW BACK PAIN WITH RIGHT-SIDED SCIATICA, UNSPECIFIED BACK PAIN LATERALITY, UNSPECIFIED CHRONICITY: ICD-10-CM

## 2023-12-26 RX ORDER — PREDNISONE 20 MG/1
TABLET ORAL
Qty: 20 TABLET | Refills: 0 | OUTPATIENT
Start: 2023-12-26

## 2023-12-27 DIAGNOSIS — M54.41 LOW BACK PAIN WITH RIGHT-SIDED SCIATICA, UNSPECIFIED BACK PAIN LATERALITY, UNSPECIFIED CHRONICITY: ICD-10-CM

## 2023-12-27 RX ORDER — CYCLOBENZAPRINE HCL 10 MG
5-10 TABLET ORAL 3 TIMES DAILY PRN
Qty: 30 TABLET | Refills: 0 | Status: SHIPPED | OUTPATIENT
Start: 2023-12-27 | End: 2024-03-14

## 2023-12-27 NOTE — TELEPHONE ENCOUNTER
Refill denied. Was prescribed for acute issue. Needs appointment for follow up on this medication.    Mark Riojas DO  12/26/2023 11:24 PM

## 2024-01-04 ENCOUNTER — MYC MEDICAL ADVICE (OUTPATIENT)
Dept: CARDIOLOGY | Facility: CLINIC | Age: 39
End: 2024-01-04
Payer: COMMERCIAL

## 2024-01-05 ENCOUNTER — TELEPHONE (OUTPATIENT)
Dept: CARDIOLOGY | Facility: CLINIC | Age: 39
End: 2024-01-05
Payer: COMMERCIAL

## 2024-01-05 NOTE — TELEPHONE ENCOUNTER
Myla message response received from patient in relation to recommendations for no change to current meds (see screenshot below for details). RN will send update to Dr. Hernandez for further review.     Thanks, does consistent elevated heart rate correspond to lower life expectancy? If so, I'd much rather deal with figuring out a new depression medication then rushing to the grave.     Dr. Mark Riojas is my PCP and Dr. Hernandez is my cardiologist if that helps the coordination of care?     Thanks in advance,   Agapito

## 2024-01-16 ENCOUNTER — MYC MEDICAL ADVICE (OUTPATIENT)
Dept: FAMILY MEDICINE | Facility: CLINIC | Age: 39
End: 2024-01-16
Payer: COMMERCIAL

## 2024-01-16 DIAGNOSIS — F33.0 MAJOR DEPRESSIVE DISORDER, RECURRENT EPISODE, MILD (H): ICD-10-CM

## 2024-01-17 NOTE — TELEPHONE ENCOUNTER
RN replied to patient via [employer].       No, higher heart rate does not decrease lifespan. Should have a yearly check up.   Dr. Hernandez

## 2024-01-18 ENCOUNTER — TELEPHONE (OUTPATIENT)
Dept: FAMILY MEDICINE | Facility: CLINIC | Age: 39
End: 2024-01-18
Payer: COMMERCIAL

## 2024-01-18 NOTE — TELEPHONE ENCOUNTER
Medication Question or Refill    [facility] - Vascepa/grams is not covered.    What medication are you calling about (include dose and sig)?:  Vascepa/grams is not covered.    Needs  prior auth    Preferred Pharmacy:     [facility] [id number] [place] [address]  [address]  [place] [address]  Phone: [phone] Fax: [phone]      Controlled Substance Agreement on file:   CSA -- Patient Level:    CSA: None found at the patient level.       Who prescribed the medication?: Dr. Riojas    Do you need a refill?  na    When did you use the medication last? na    Patient offered an appointment? No    Do you have any questions or concerns?  No      Could we [facility] this information to you [facility] or would you prefer to receive a phone call?:   na    Put in providers in box    Radha MATHEWS

## 2024-01-19 NOTE — TELEPHONE ENCOUNTER
Prior Authorization Approval    Authorization Effective Date: 1/19/2024  Authorization Expiration Date: 1/19/2025  Medication: Icosapent Ethyl 1GM capsules  Reference #:     Insurance Company: [employer] ([facility]) - Phone [phone] Fax [phone]  Which Pharmacy is filling the prescription (Not needed for infusion/clinic administered): [facility] [place] - [address] [facility]  Pharmacy Notified: Yes  Patient Notified: Instructed pharmacy to notify patient when script is ready to /ship.

## 2024-01-19 NOTE — TELEPHONE ENCOUNTER
Pharmacy pended.     Routing to refill pool.     ASHLEY HOLT RN on 1/19/2024 at 2:19 PM   [facility]

## 2024-01-19 NOTE — TELEPHONE ENCOUNTER
It looks like prior authorization is required. Can we start this?    Mark Riojas,   1/19/2024 1:11 AM

## 2024-01-19 NOTE — TELEPHONE ENCOUNTER
Central Prior Authorization Team   Phone: [phone]    [place] Initiation    Medication: Icosapent Ethyl 1GM capsules  Insurance Company: [email or web address] ([facility]) - Phone [phone] Fax [phone]  Pharmacy Filling the Rx: [facility] [place] - [address]  Filling Pharmacy Phone: [phone]  Filling Pharmacy Fax:    Start Date: 1/19/2024

## 2024-01-22 RX ORDER — BUPROPION HYDROCHLORIDE 150 MG/1
150 TABLET ORAL EVERY MORNING
Qty: 90 TABLET | Refills: 1 | Status: SHIPPED | OUTPATIENT
Start: 2024-01-22 | End: 2024-09-13

## 2024-03-14 ENCOUNTER — MYC REFILL (OUTPATIENT)
Dept: FAMILY MEDICINE | Facility: CLINIC | Age: 39
End: 2024-03-14
Payer: COMMERCIAL

## 2024-03-14 DIAGNOSIS — M54.41 LOW BACK PAIN WITH RIGHT-SIDED SCIATICA, UNSPECIFIED BACK PAIN LATERALITY, UNSPECIFIED CHRONICITY: ICD-10-CM

## 2024-03-18 RX ORDER — CYCLOBENZAPRINE HCL 10 MG
5-10 TABLET ORAL 3 TIMES DAILY PRN
Qty: 30 TABLET | Refills: 0 | Status: SHIPPED | OUTPATIENT
Start: 2024-03-18

## 2024-06-08 ENCOUNTER — MYC MEDICAL ADVICE (OUTPATIENT)
Dept: CARDIOLOGY | Facility: CLINIC | Age: 39
End: 2024-06-08
Payer: COMMERCIAL

## 2024-06-10 ENCOUNTER — TELEPHONE (OUTPATIENT)
Dept: CARDIOLOGY | Facility: CLINIC | Age: 39
End: 2024-06-10

## 2024-06-10 DIAGNOSIS — R07.9 CHEST PAIN: Primary | ICD-10-CM

## 2024-06-10 DIAGNOSIS — I51.9 LV DYSFUNCTION: ICD-10-CM

## 2024-06-10 NOTE — TELEPHONE ENCOUNTER
Patient is asking for any further testing that can be done to check for blockages and/or find a source of his chest discomfort.  Patient did express interest in CT calcium scoring due to family history.  Will route to provider to advise.  Nedra Peacock RN on 6/10/2024 at 1:35 PM

## 2024-06-19 ENCOUNTER — TELEPHONE (OUTPATIENT)
Dept: CARDIOLOGY | Facility: CLINIC | Age: 39
End: 2024-06-19

## 2024-06-19 ENCOUNTER — HOSPITAL ENCOUNTER (OUTPATIENT)
Dept: CARDIOLOGY | Facility: CLINIC | Age: 39
Discharge: HOME | End: 2024-06-19
Attending: INTERNAL MEDICINE | Admitting: INTERNAL MEDICINE
Payer: COMMERCIAL

## 2024-06-19 DIAGNOSIS — I42.8 NONISCHEMIC CARDIOMYOPATHY (H): ICD-10-CM

## 2024-06-19 LAB — LVEF ECHO: NORMAL

## 2024-06-19 PROCEDURE — 255N000002 HC RX 255 OP 636: Performed by: INTERNAL MEDICINE

## 2024-06-19 PROCEDURE — 93306 TTE W/DOPPLER COMPLETE: CPT | Mod: 26 | Performed by: INTERNAL MEDICINE

## 2024-06-19 PROCEDURE — 999N000208 ECHOCARDIOGRAM COMPLETE

## 2024-06-19 RX ADMIN — HUMAN ALBUMIN MICROSPHERES AND PERFLUTREN 3 ML: 10; .22 INJECTION, SOLUTION INTRAVENOUS at 14:01

## 2024-06-19 NOTE — TELEPHONE ENCOUNTER
Results noted. No future F/U apt scheduled. RN will send to Dr. Hernandez to inquire if any further recommendations based off of results.             6/19/24 ECHO  Interpretation Summary     The visual ejection fraction is 40-45%.  Compared to prior study, there is no significant change.

## 2024-06-19 NOTE — TELEPHONE ENCOUNTER
Maia Hernandez, Nedra Thomas, RN22 hours ago (12:50 PM)     Can order CTA, diagnosis CP and LV dysfunction     Order placed  Patient notified via lolis REYNOLDS RN Care Coordinator  [facility]

## 2024-06-27 DIAGNOSIS — E78.1 HYPERTRIGLYCERIDEMIA: ICD-10-CM

## 2024-06-28 RX ORDER — ICOSAPENT ETHYL 1 G/1
CAPSULE ORAL
Qty: 360 CAPSULE | Refills: 3 | Status: SHIPPED | OUTPATIENT
Start: 2024-06-28

## 2024-07-29 ENCOUNTER — HOSPITAL ENCOUNTER (OUTPATIENT)
Dept: CARDIOLOGY | Facility: CLINIC | Age: 39
Discharge: HOME | End: 2024-07-29
Attending: INTERNAL MEDICINE | Admitting: INTERNAL MEDICINE
Payer: COMMERCIAL

## 2024-07-29 VITALS — DIASTOLIC BLOOD PRESSURE: 76 MMHG | HEART RATE: 74 BPM | SYSTOLIC BLOOD PRESSURE: 120 MMHG

## 2024-07-29 DIAGNOSIS — I51.9 LV DYSFUNCTION: ICD-10-CM

## 2024-07-29 DIAGNOSIS — R07.9 CHEST PAIN: ICD-10-CM

## 2024-07-29 PROCEDURE — 75574 CT ANGIO HRT W/3D IMAGE: CPT | Mod: 26 | Performed by: INTERNAL MEDICINE

## 2024-07-29 PROCEDURE — 75574 CT ANGIO HRT W/3D IMAGE: CPT

## 2024-07-29 PROCEDURE — 250N000011 HC RX IP 250 OP 636: Performed by: INTERNAL MEDICINE

## 2024-07-29 PROCEDURE — 250N000009 HC RX 250: Performed by: INTERNAL MEDICINE

## 2024-07-29 PROCEDURE — 250N000013 HC RX MED GY IP 250 OP 250 PS 637: Performed by: INTERNAL MEDICINE

## 2024-07-29 RX ORDER — IVABRADINE 5 MG/1
5-15 TABLET, FILM COATED ORAL
Status: COMPLETED | OUTPATIENT
Start: 2024-07-29 | End: 2024-07-29

## 2024-07-29 RX ORDER — LIDOCAINE 40 MG/G
CREAM TOPICAL
OUTPATIENT
Start: 2024-07-29

## 2024-07-29 RX ORDER — DILTIAZEM HCL 60 MG
120 TABLET ORAL
Status: DISCONTINUED | OUTPATIENT
Start: 2024-07-29 | End: 2024-07-30 | Stop reason: HOSPADM

## 2024-07-29 RX ORDER — DIPHENHYDRAMINE HYDROCHLORIDE 50 MG/ML
25-50 INJECTION INTRAMUSCULAR; INTRAVENOUS
Status: DISCONTINUED | OUTPATIENT
Start: 2024-07-29 | End: 2024-07-30 | Stop reason: HOSPADM

## 2024-07-29 RX ORDER — METOPROLOL TARTRATE 1 MG/ML
5-15 INJECTION, SOLUTION INTRAVENOUS
Status: DISCONTINUED | OUTPATIENT
Start: 2024-07-29 | End: 2024-07-30 | Stop reason: HOSPADM

## 2024-07-29 RX ORDER — DIPHENHYDRAMINE HCL 25 MG
25 CAPSULE ORAL
Status: DISCONTINUED | OUTPATIENT
Start: 2024-07-29 | End: 2024-07-30 | Stop reason: HOSPADM

## 2024-07-29 RX ORDER — NITROGLYCERIN 0.4 MG/1
0.4 TABLET SUBLINGUAL
Status: DISCONTINUED | OUTPATIENT
Start: 2024-07-29 | End: 2024-07-30 | Stop reason: HOSPADM

## 2024-07-29 RX ORDER — IOPAMIDOL 755 MG/ML
500 INJECTION, SOLUTION INTRAVASCULAR ONCE
Status: COMPLETED | OUTPATIENT
Start: 2024-07-29 | End: 2024-07-29

## 2024-07-29 RX ORDER — DILTIAZEM HYDROCHLORIDE 5 MG/ML
10-15 INJECTION INTRAVENOUS
Status: DISCONTINUED | OUTPATIENT
Start: 2024-07-29 | End: 2024-07-30 | Stop reason: HOSPADM

## 2024-07-29 RX ORDER — METHYLPREDNISOLONE SODIUM SUCCINATE 125 MG/2ML
125 INJECTION, POWDER, LYOPHILIZED, FOR SOLUTION INTRAMUSCULAR; INTRAVENOUS
Status: DISCONTINUED | OUTPATIENT
Start: 2024-07-29 | End: 2024-07-30 | Stop reason: HOSPADM

## 2024-07-29 RX ORDER — ONDANSETRON 2 MG/ML
4 INJECTION INTRAMUSCULAR; INTRAVENOUS
Status: DISCONTINUED | OUTPATIENT
Start: 2024-07-29 | End: 2024-07-30 | Stop reason: HOSPADM

## 2024-07-29 RX ORDER — METOPROLOL TARTRATE 25 MG/1
25-100 TABLET, FILM COATED ORAL
Status: COMPLETED | OUTPATIENT
Start: 2024-07-29 | End: 2024-07-29

## 2024-07-29 RX ADMIN — METOPROLOL TARTRATE 100 MG: 50 TABLET, FILM COATED ORAL at 13:05

## 2024-07-29 RX ADMIN — METOPROLOL TARTRATE 10 MG: 5 INJECTION INTRAVENOUS at 14:16

## 2024-07-29 RX ADMIN — METOPROLOL TARTRATE 10 MG: 5 INJECTION INTRAVENOUS at 14:07

## 2024-07-29 RX ADMIN — IVABRADINE 15 MG: 5 TABLET, FILM COATED ORAL at 13:06

## 2024-07-29 RX ADMIN — IOPAMIDOL 120 ML: 755 INJECTION, SOLUTION INTRAVENOUS at 14:49

## 2024-07-29 RX ADMIN — NITROGLYCERIN 0.4 MG: 0.4 TABLET SUBLINGUAL at 14:37

## 2024-07-29 RX ADMIN — METOPROLOL TARTRATE 10 MG: 5 INJECTION INTRAVENOUS at 14:10

## 2024-07-29 RX ADMIN — METOPROLOL TARTRATE 10 MG: 5 INJECTION INTRAVENOUS at 14:13

## 2024-07-31 ENCOUNTER — TELEPHONE (OUTPATIENT)
Dept: CARDIOLOGY | Facility: CLINIC | Age: 39
End: 2024-07-31

## 2024-07-31 NOTE — TELEPHONE ENCOUNTER
Please review CT angiogram results available to view in chart.  Performed due to CP and LV dysfunction  Nedra Peacock RN on 7/31/2024 at 12:52 PM

## 2024-08-08 ENCOUNTER — MYC MEDICAL ADVICE (OUTPATIENT)
Dept: FAMILY MEDICINE | Facility: CLINIC | Age: 39
End: 2024-08-08
Payer: COMMERCIAL

## 2024-09-04 NOTE — TELEPHONE ENCOUNTER
No new recommendations, [employer] message sent to patient.  Nedra Peacock RN on 9/4/2024 at 8:15 AM

## 2024-09-11 DIAGNOSIS — F33.0 MAJOR DEPRESSIVE DISORDER, RECURRENT EPISODE, MILD (H): ICD-10-CM

## 2024-09-13 RX ORDER — BUPROPION HYDROCHLORIDE 150 MG/1
150 TABLET ORAL EVERY MORNING
Qty: 90 TABLET | Refills: 1 | Status: SHIPPED | OUTPATIENT
Start: 2024-09-13

## 2024-11-10 ENCOUNTER — TELEPHONE (OUTPATIENT)
Dept: FAMILY MEDICINE | Facility: CLINIC | Age: 39
End: 2024-11-10
Payer: COMMERCIAL

## 2024-11-14 NOTE — TELEPHONE ENCOUNTER
Prior Authorization Approval    Medication: VASCEPA 1 G PO CAPS  Authorization Effective Date: 11/14/2024  Authorization Expiration Date: 11/14/2025  Approved Dose/Quantity: 120/30  Reference #:     Insurance Company: [employer] ([facility]) - Phone [phone] Fax [phone]  Expected CoPay: $    CoPay Card Available:      Financial Assistance Needed:   Which Pharmacy is filling the prescription: [facility] [place] - [address] [place] [address]  Pharmacy Notified: [place]  Patient Notified: Yes

## 2024-11-28 ENCOUNTER — MYC REFILL (OUTPATIENT)
Dept: FAMILY MEDICINE | Facility: CLINIC | Age: 39
End: 2024-11-28
Payer: COMMERCIAL

## 2024-11-28 DIAGNOSIS — M54.41 LOW BACK PAIN WITH RIGHT-SIDED SCIATICA, UNSPECIFIED BACK PAIN LATERALITY, UNSPECIFIED CHRONICITY: ICD-10-CM

## 2024-11-28 DIAGNOSIS — N52.9 ERECTILE DYSFUNCTION, UNSPECIFIED ERECTILE DYSFUNCTION TYPE: ICD-10-CM

## 2024-12-01 RX ORDER — CYCLOBENZAPRINE HCL 10 MG
TABLET ORAL
Qty: 30 TABLET | Refills: 0 | Status: SHIPPED | OUTPATIENT
Start: 2024-12-01

## 2024-12-01 RX ORDER — TADALAFIL 10 MG/1
10 TABLET ORAL DAILY PRN
Qty: 30 TABLET | Refills: 1 | Status: SHIPPED | OUTPATIENT
Start: 2024-12-01

## 2024-12-03 ENCOUNTER — OFFICE VISIT (OUTPATIENT)
Dept: FAMILY MEDICINE | Facility: CLINIC | Age: 39
End: 2024-12-03
Payer: COMMERCIAL

## 2024-12-03 VITALS
DIASTOLIC BLOOD PRESSURE: 80 MMHG | WEIGHT: 213 LBS | RESPIRATION RATE: 12 BRPM | TEMPERATURE: 97.9 F | BODY MASS INDEX: 31.55 KG/M2 | OXYGEN SATURATION: 99 % | HEIGHT: 69 IN | HEART RATE: 78 BPM | SYSTOLIC BLOOD PRESSURE: 124 MMHG

## 2024-12-03 DIAGNOSIS — E78.5 HYPERLIPIDEMIA LDL GOAL <130: ICD-10-CM

## 2024-12-03 DIAGNOSIS — Z23 NEED FOR PNEUMOCOCCAL 20-VALENT CONJUGATE VACCINATION: ICD-10-CM

## 2024-12-03 DIAGNOSIS — E66.811 CLASS 1 OBESITY DUE TO EXCESS CALORIES WITHOUT SERIOUS COMORBIDITY WITH BODY MASS INDEX (BMI) OF 31.0 TO 31.9 IN ADULT: ICD-10-CM

## 2024-12-03 DIAGNOSIS — Z00.00 ROUTINE GENERAL MEDICAL EXAMINATION AT A HEALTH CARE FACILITY: Primary | ICD-10-CM

## 2024-12-03 DIAGNOSIS — E78.1 HYPERTRIGLYCERIDEMIA: ICD-10-CM

## 2024-12-03 DIAGNOSIS — Z13.1 SCREENING FOR DIABETES MELLITUS: ICD-10-CM

## 2024-12-03 DIAGNOSIS — I42.9 SECONDARY CARDIOMYOPATHY (H): ICD-10-CM

## 2024-12-03 DIAGNOSIS — I42.8 NONISCHEMIC CARDIOMYOPATHY (H): ICD-10-CM

## 2024-12-03 DIAGNOSIS — E66.09 CLASS 1 OBESITY DUE TO EXCESS CALORIES WITHOUT SERIOUS COMORBIDITY WITH BODY MASS INDEX (BMI) OF 31.0 TO 31.9 IN ADULT: ICD-10-CM

## 2024-12-03 DIAGNOSIS — Z13.0 SCREENING FOR DEFICIENCY ANEMIA: ICD-10-CM

## 2024-12-03 LAB
ALBUMIN SERPL BCG-MCNC: 4.5 G/DL (ref 3.5–5.2)
ALP SERPL-CCNC: 40 U/L (ref 40–150)
ALT SERPL W P-5'-P-CCNC: 49 U/L (ref 0–70)
ANION GAP SERPL CALCULATED.3IONS-SCNC: 13 MMOL/L (ref 7–15)
AST SERPL W P-5'-P-CCNC: 29 U/L (ref 0–45)
BILIRUB SERPL-MCNC: 0.4 MG/DL
BUN SERPL-MCNC: 20.4 MG/DL (ref 6–20)
CALCIUM SERPL-MCNC: 9.4 MG/DL (ref 8.8–10.4)
CHLORIDE SERPL-SCNC: 103 MMOL/L (ref 98–107)
CHOLEST SERPL-MCNC: 178 MG/DL
CREAT SERPL-MCNC: 0.99 MG/DL (ref 0.67–1.17)
EGFRCR SERPLBLD CKD-EPI 2021: >90 ML/MIN/1.73M2
ERYTHROCYTE [DISTWIDTH] IN BLOOD BY AUTOMATED COUNT: 12 % (ref 10–15)
EST. AVERAGE GLUCOSE BLD GHB EST-MCNC: 108 MG/DL
FASTING STATUS PATIENT QL REPORTED: YES
FASTING STATUS PATIENT QL REPORTED: YES
GLUCOSE SERPL-MCNC: 95 MG/DL (ref 70–99)
HBA1C MFR BLD: 5.4 % (ref 0–5.6)
HCO3 SERPL-SCNC: 24 MMOL/L (ref 22–29)
HCT VFR BLD AUTO: 37.6 % (ref 40–53)
HDLC SERPL-MCNC: 54 MG/DL
HGB BLD-MCNC: 13.4 G/DL (ref 13.3–17.7)
LDLC SERPL CALC-MCNC: 85 MG/DL
MCH RBC QN AUTO: 33.3 PG (ref 26.5–33)
MCHC RBC AUTO-ENTMCNC: 35.6 G/DL (ref 31.5–36.5)
MCV RBC AUTO: 94 FL (ref 78–100)
NONHDLC SERPL-MCNC: 124 MG/DL
PLATELET # BLD AUTO: 160 10E3/UL (ref 150–450)
POTASSIUM SERPL-SCNC: 4.2 MMOL/L (ref 3.4–5.3)
PROT SERPL-MCNC: 6.8 G/DL (ref 6.4–8.3)
RBC # BLD AUTO: 4.02 10E6/UL (ref 4.4–5.9)
SODIUM SERPL-SCNC: 140 MMOL/L (ref 135–145)
TRIGL SERPL-MCNC: 194 MG/DL
WBC # BLD AUTO: 6.1 10E3/UL (ref 4–11)

## 2024-12-03 PROCEDURE — 90471 IMMUNIZATION ADMIN: CPT | Performed by: FAMILY MEDICINE

## 2024-12-03 PROCEDURE — 99395 PREV VISIT EST AGE 18-39: CPT | Mod: 25 | Performed by: FAMILY MEDICINE

## 2024-12-03 PROCEDURE — 85027 COMPLETE CBC AUTOMATED: CPT | Performed by: FAMILY MEDICINE

## 2024-12-03 PROCEDURE — 90677 PCV20 VACCINE IM: CPT | Performed by: FAMILY MEDICINE

## 2024-12-03 PROCEDURE — 36415 COLL VENOUS BLD VENIPUNCTURE: CPT | Performed by: FAMILY MEDICINE

## 2024-12-03 PROCEDURE — 84403 ASSAY OF TOTAL TESTOSTERONE: CPT | Performed by: FAMILY MEDICINE

## 2024-12-03 PROCEDURE — 83036 HEMOGLOBIN GLYCOSYLATED A1C: CPT | Performed by: FAMILY MEDICINE

## 2024-12-03 PROCEDURE — 80061 LIPID PANEL: CPT | Performed by: FAMILY MEDICINE

## 2024-12-03 PROCEDURE — 80053 COMPREHEN METABOLIC PANEL: CPT | Performed by: FAMILY MEDICINE

## 2024-12-03 PROCEDURE — 99214 OFFICE O/P EST MOD 30 MIN: CPT | Mod: 25 | Performed by: FAMILY MEDICINE

## 2024-12-03 RX ORDER — METOPROLOL SUCCINATE 50 MG/1
50 TABLET, EXTENDED RELEASE ORAL DAILY
Qty: 90 TABLET | Refills: 3 | Status: SHIPPED | OUTPATIENT
Start: 2024-12-03

## 2024-12-03 RX ORDER — LOSARTAN POTASSIUM 25 MG/1
25 TABLET ORAL DAILY
Qty: 90 TABLET | Refills: 3 | Status: SHIPPED | OUTPATIENT
Start: 2024-12-03

## 2024-12-03 SDOH — HEALTH STABILITY: PHYSICAL HEALTH: ON AVERAGE, HOW MANY DAYS PER WEEK DO YOU ENGAGE IN MODERATE TO STRENUOUS EXERCISE (LIKE A BRISK WALK)?: 2 DAYS

## 2024-12-03 ASSESSMENT — SOCIAL DETERMINANTS OF HEALTH (SDOH): HOW OFTEN DO YOU GET TOGETHER WITH FRIENDS OR RELATIVES?: NEVER

## 2024-12-03 ASSESSMENT — PAIN SCALES - GENERAL: PAINLEVEL_OUTOF10: NO PAIN (0)

## 2024-12-03 ASSESSMENT — PATIENT HEALTH QUESTIONNAIRE - PHQ9
10. IF YOU CHECKED OFF ANY PROBLEMS, HOW DIFFICULT HAVE THESE PROBLEMS MADE IT FOR YOU TO DO YOUR WORK, TAKE CARE OF THINGS AT HOME, OR GET ALONG WITH OTHER PEOPLE: NOT DIFFICULT AT ALL
SUM OF ALL RESPONSES TO PHQ QUESTIONS 1-9: 8
SUM OF ALL RESPONSES TO PHQ QUESTIONS 1-9: 8

## 2024-12-03 NOTE — PATIENT INSTRUCTIONS
Patient Education   Preventive Care Advice   This is general advice given by our system to help you stay healthy. However, your care team may have specific advice just for you. Please talk to your care team about your preventive care needs.  Nutrition  Eat 5 or more servings of fruits and vegetables each day.  Try wheat bread, brown rice and whole grain pasta (instead of white bread, rice, and pasta).  Get enough calcium and vitamin D. Check the label on foods and aim for 100% of the RDA (recommended daily allowance).  Lifestyle  Exercise at least 150 minutes each week  (30 minutes a day, 5 days a week).  Do muscle strengthening activities 2 days a week. These help control your weight and prevent disease.  No smoking.  Wear sunscreen to prevent skin cancer.  Have a dental exam and cleaning every 6 months.  Yearly exams  See your health care team every year to talk about:  Any changes in your health.  Any medicines your care team has prescribed.  Preventive care, family planning, and ways to prevent chronic diseases.  Shots (vaccines)   HPV shots (up to age 26), if you've never had them before.  Hepatitis B shots (up to age 59), if you've never had them before.  COVID-19 shot: Get this shot when it's due.  Flu shot: Get a flu shot every year.  Tetanus shot: Get a tetanus shot every 10 years.  Pneumococcal, hepatitis A, and RSV shots: Ask your care team if you need these based on your risk.  Shingles shot (for age 50 and up)  General health tests  Diabetes screening:  Starting at age 35, Get screened for diabetes at least every 3 years.  If you are younger than age 35, ask your care team if you should be screened for diabetes.  Cholesterol test: At age 39, start having a cholesterol test every 5 years, or more often if advised.  Bone density scan (DEXA): At age 50, ask your care team if you should have this scan for osteoporosis (brittle bones).  Hepatitis C: Get tested at least once in your life.  STIs (sexually  transmitted infections)  Before age 24: Ask your care team if you should be screened for STIs.  After age 24: Get screened for STIs if you're at risk. You are at risk for STIs (including HIV) if:  You are sexually active with more than one person.  You don't use condoms every time.  You or a partner was diagnosed with a sexually transmitted infection.  If you are at risk for HIV, ask about PrEP medicine to prevent HIV.  Get tested for HIV at least once in your life, whether you are at risk for HIV or not.  Cancer screening tests  Cervical cancer screening: If you have a cervix, begin getting regular cervical cancer screening tests starting at age 21.  Breast cancer scan (mammogram): If you've ever had breasts, begin having regular mammograms starting at age 40. This is a scan to check for breast cancer.  Colon cancer screening: It is important to start screening for colon cancer at age 45.  Have a colonoscopy test every 10 years (or more often if you're at risk) Or, ask your provider about stool tests like a FIT test every year or Cologuard test every 3 years.  To learn more about your testing options, visit:   .  For help making a decision, visit:   https://bit.ly/dn54945.  Prostate cancer screening test: If you have a prostate, ask your care team if a prostate cancer screening test (PSA) at age 55 is right for you.  Lung cancer screening: If you are a current or former smoker ages 50 to 80, ask your care team if ongoing lung cancer screenings are right for you.  For informational purposes only. Not to replace the advice of your health care provider. Copyright   2023 [facility]. All rights reserved. Clinically reviewed by the [facility]. GOWEX [id number] - REV 01/24.  Learning About Stress  What is stress?     Stress is your body's response to a hard situation. Your body can have a physical, emotional, or mental response. Stress is a fact of life for most people, and it  affects everyone differently. What causes stress for you may not be stressful for someone else.  A lot of things can cause stress. You may feel stress when you go on a job interview, take a test, or run a race. This kind of short-term stress is normal and even useful. It can help you if you need to work hard or react quickly. For example, stress can help you finish an important job on time.  Long-term stress is caused by ongoing stressful situations or events. Examples of long-term stress include long-term health problems, ongoing problems at work, or conflicts in your family. Long-term stress can harm your health.  How does stress affect your health?  When you are stressed, your body responds as though you are in danger. It makes hormones that speed up your heart, make you breathe faster, and give you a burst of energy. This is called the fight-or-flight stress response. If the stress is over quickly, your body goes back to normal and no harm is done.  But if stress happens too often or lasts too long, it can have bad effects. Long-term stress can make you more likely to get sick, and it can make symptoms of some diseases worse. If you tense up when you are stressed, you may develop neck, shoulder, or low back pain. Stress is linked to high blood pressure and heart disease.  Stress also harms your emotional health. It can make you green, tense, or depressed. Your relationships may suffer, and you may not do well at work or school.  What can you do to manage stress?  You can try these things to help manage stress:   Do something active. Exercise or activity can help reduce stress. Walking is a great way to get started. Even everyday activities such as housecleaning or yard work can help.  Try yoga or silvia chi. These techniques combine exercise and meditation. You may need some training at first to learn them.  Do something you enjoy. For example, listen to music or go to a movie. Practice your hobby or do volunteer  "work.  Meditate. This can help you relax, because you are not worrying about what happened before or what may happen in the future.  Do guided imagery. Imagine yourself in any setting that helps you feel calm. You can use online videos, books, or a teacher to guide you.  Do breathing exercises. For example:  From a standing position, bend forward from the waist with your knees slightly bent. Let your arms dangle close to the floor.  Breathe in slowly and deeply as you return to a standing position. Roll up slowly and lift your head last.  Hold your breath for just a few seconds in the standing position.  Breathe out slowly and bend forward from the waist.  Let your feelings out. Talk, laugh, cry, and express anger when you need to. Talking with supportive friends or family, a counselor, or a amilcar leader about your feelings is a healthy way to relieve stress. Avoid discussing your feelings with people who make you feel worse.  Write. It may help to write about things that are bothering you. This helps you find out how much stress you feel and what is causing it. When you know this, you can find better ways to cope.  What can you do to prevent stress?  You might try some of these things to help prevent stress:  Manage your time. This helps you find time to do the things you want and need to do.  Get enough sleep. Your body recovers from the stresses of the day while you are sleeping.  Get support. Your family, friends, and community can make a difference in how you experience stress.  Limit your news feed. Avoid or limit time on social media or news that may make you feel stressed.  Do something active. Exercise or activity can help reduce stress. Walking is a great way to get started.  Where can you learn more?  Go to https://www.ViaCyte.net/patiented  Enter N032 in the search box to learn more about \"Learning About Stress.\"  Current as of: October 24, 2023  Content Version: 14.2 2024 [employer]. "   Care instructions adapted under license by your healthcare professional. If you have questions about a medical condition or this instruction, always ask your healthcare professional. Healthwise, [facility] disclaims any warranty or liability for your use of this information.    Learning About Depression Screening  What is depression screening?  Depression screening is a way to see if you have depression symptoms. It may be done by a doctor or counselor. It's often part of a routine checkup. That's because your mental health is just as important as your physical health.  Depression is a mental health condition that affects how you feel, think, and act. You may:  Have less energy.  Lose interest in your daily activities.  Feel sad and grouchy for a long time.  Depression is very common. It affects people of all ages.  Many things can lead to depression. Some people become depressed after they have a stroke or find out they have a major illness like cancer or heart disease. The death of a loved one or a breakup may lead to depression. It can run in families. Most experts believe that a combination of inherited genes and stressful life events can cause it.  What happens during screening?  You may be asked to fill out a form about your depression symptoms. You and the doctor will discuss your answers. The doctor may ask you more questions to learn more about how you think, act, and feel.  What happens after screening?  If you have symptoms of depression, your doctor will talk to you about your options.  Doctors usually treat depression with medicines or counseling. Often, combining the two works best. Many people don't get help because they think that they'll get over the depression on their own. But people with depression may not get better unless they get treatment.  The cause of depression is not well understood. There may be many factors involved. But if you have depression, it's not your fault.  A serious  "symptom of depression is thinking about death or suicide. If you or someone you care about talks about this or about feeling hopeless, get help right away.  It's important to know that depression can be treated. Medicine, counseling, and self-care may help.  Where can you learn more?  Go to https://www.P[employer].net/patiented  Enter T185 in the search box to learn more about \"Learning About Depression Screening.\"  Current as of: June 24, 2023  Content Version: 14.2 2024 [employer].   Care instructions adapted under license by your healthcare professional. If you have questions about a medical condition or this instruction, always ask your healthcare professional. [employer] disclaims any warranty or liability for your use of this information.    9 Ways to Cut Back on Drinking  Maybe you've found yourself drinking more alcohol than you'd prefer. If you want to cut back, here are some ideas to try.    Think before you drink.  Do you really want a drink, or is it just a habit? If you're used to having a drink at a certain time, try doing something else then.     Look for substitutes.  Find some no-alcohol drinks that you enjoy, like flavored seltzer water, tea with honey, or tonic with a slice of lime. Or try alcohol-free beer or \"virgin\" cocktails (without the alcohol).     Drink more water.  Use water to quench your thirst. Drink a glass of water before you have any alcohol. Have another glass along with every drink or between drinks.     Shrink your drink.  For example, have a bottle of beer instead of a pint. Use a smaller glass for wine. Choose drinks with lower alcohol content (ABV%). Or use less liquor and more mixer in cocktails.     Slow down.  It's easy to drink quickly and without thinking about it. Pay attention, and make each drink last longer.     Do the math.  Total up how much you spend on alcohol each month. How much is that a year? If you cut back, what could you do with " "the money you save?     Take a break.  Choose a day or two each week when you won't drink at all. Notice how you feel on those days, physically and emotionally. How did you sleep? Do you feel better? Over time, add more break days.     Count calories.  Would you like to lose some weight? For some people that's a good motivator for cutting back. Figure out how many calories are in each drink. How many does that add up to in a day? In a week? In a month?     Practice saying no.  Be ready when someone offers you a drink. Try: \"Thanks, I've had enough.\" Or \"Thanks, but I'm cutting back.\" Or \"No, thanks. I feel better when I drink less.\"   Current as of: November 15, 2023  Content Version: 14.2 2024 [facility].   Care instructions adapted under license by your healthcare professional. If you have questions about a medical condition or this instruction, always ask your healthcare professional. Healthwise, Incorporated disclaims any warranty or liability for your use of this information.     "

## 2024-12-03 NOTE — PROGRESS NOTES
Preventive Care Visit  [facility] [place]  Mark Riojas DO, Family Medicine  Dec 3, 2024      Assessment & Plan     Routine general medical examination at a health care facility  Health maintenance reviewed and updated.     Class 1 obesity due to excess calories without serious comorbidity with body mass index (BMI) of 31.0 to 31.9 in adult  Discussed risks and benefits of semaglutide. Emphasized importance of balanced, low-saturated fat, low-carb diet and regular exercise to help with weight loss. Encouraged cutting back further on alcohol use as well. Start Wegovy and titrate dose as indicated. Follow up in 3 months.   - Testosterone, total; Future  - semaglutide-weight management (WEGOVY) 0.25 MG/0.5ML pen; Inject 0.5 mLs (0.25 mg) subcutaneously once a week.  - Semaglutide-Weight Management (WEGOVY) 0.5 MG/0.5ML pen; Inject 0.5 mg subcutaneously once a week.  - Semaglutide-Weight Management (WEGOVY) 1 MG/0.5ML pen; Inject 1 mg subcutaneously once a week.  - Testosterone, total    Hyperlipidemia LDL goal <130  Stable, recheck lipids  - Lipid panel reflex to direct LDL Fasting; Future  - Lipid panel reflex to direct LDL Fasting    Hypertriglyceridemia    - Lipid panel reflex to direct LDL Fasting; Future  - Lipid panel reflex to direct LDL Fasting    Screening for deficiency anemia  - CBC with platelets; Future  - CBC with platelets    Screening for diabetes mellitus  - Comprehensive metabolic panel (BMP + Alb, Alk Phos, ALT, AST, Total. Bili, TP); Future  - Hemoglobin A1c; Future  - Comprehensive metabolic panel (BMP + Alb, Alk Phos, ALT, AST, Total. Bili, TP)  - Hemoglobin A1c    Need for pneumococcal 20-valent conjugate vaccination  - Pneumococcal 20 Valent Conjugate (Prevnar 20)    Secondary cardiomyopathy (H)  stable  - losartan (COZAAR) 25 MG tablet; Take 1 tablet (25 mg) by mouth daily. Appointment required for further refills    Nonischemic cardiomyopathy (H)  stable  - metoprolol  "succinate ER (TOPROL XL) 50 MG 24 hr tablet; Take 1 tablet (50 mg) by mouth daily.    Patient has been advised of split billing requirements and indicates understanding: Yes        BMI  Estimated body mass index is 31.45 kg/m  as calculated from the following:    Height as of this encounter: 1.753 m (5' 9\").    Weight as of this encounter: 96.6 kg (213 lb).       Counseling  Appropriate preventive services were addressed with this patient via screening, questionnaire, or discussion as appropriate for fall prevention, nutrition, physical activity, Tobacco-use cessation, social engagement, weight loss and cognition.  Checklist reviewing preventive services available has been given to the patient.  Reviewed patient's diet, addressing concerns and/or questions.   He is at risk for lack of exercise and has been provided with information to increase physical activity for the benefit of his well-being.   Patient is at risk for social isolation and has been provided with information about the benefit of social connection.   He is at risk for psychosocial distress and has been provided with information to reduce risk.   The patient reports drinking more than 3 alcoholic drinks per day and/or more than 7 drhnks per week. The patient was counseled and given information about possible harmful effects of excessive alcohol intake.The patient's PHQ-9 score is consistent with mild depression. He was provided with information regarding depression.         Gema Altman is a 39 year old, presenting for the following:  Physical        12/3/2024     6:54 AM   Additional Questions   Roomed by JAM Major CMA   Accompanied by SELF          HPI    Obesity: wanting to lose weight; history of nonischemic cardiomyopathy. He does acknowledge drinking more alcohol through COVID but has been working on cutting this down. He tends to not drink throughout the week but will have 6-12 drinks on the weekend. Walks dog but could get more " exercise.      Hyperlipidemia Follow-Up    Are you regularly taking any medication or supplement to lower your cholesterol?   YES - icosapent ethyl (VASCEPA) 1 g CAPS capsule and rosuvastatin (CRESTOR) 20 MG tablet   Are you having muscle aches or other side effects that you think could be caused by your cholesterol lowering medication?  No    Hypertension Follow-up    Do you check your blood pressure regularly outside of the clinic? No   Are you following a low salt diet? No  Are your blood pressures ever more than 140 on the top number (systolic) OR more   than 90 on the bottom number (diastolic), for example 140/90? N/A    Denies any headaches, lightheadedness, dizziness, vision changes, palpitations, shortness of breath, dyspnea, numbness/tingling.      BP Readings from Last 6 Encounters:   12/03/24 124/80   07/29/24 120/76   11/09/23 138/88   10/11/22 118/72   10/07/21 108/72   01/28/21 120/82         Depression   How are you doing with your depression since your last visit? No change  Have you had a significant life event?  No   Are you feeling anxious or having panic attacks?   No more than normal   Do you have any concerns with your use of alcohol or other drugs? YES      Social History     Tobacco Use    Smoking status: Never     Passive exposure: Never    Smokeless tobacco: Never   Vaping Use    Vaping status: Never Used   Substance Use Topics    Alcohol use: Yes     Alcohol/week: 6.0 - 12.0 standard drinks of alcohol     Types: 6 - 12 Standard drinks or equivalent per week    Drug use: Yes     Types: Marijuana     Comment: marijuana - weekly         10/11/2022    10:05 AM 12/15/2023     3:37 PM 12/3/2024     6:52 AM   PHQ   PHQ-9 Total Score 6 5 8    Q9: Thoughts of better off dead/self-harm past 2 weeks Not at all  Not at all Not at all        Patient-reported         11/9/2020     2:18 PM 1/28/2021    11:21 AM 1/22/2022     1:04 PM   FABIAN-7 SCORE   Total Score 10 (moderate anxiety)  7 (mild anxiety)    Total Score 10 5 7         12/3/2024     6:52 AM   Last PHQ-9   1.  Little interest or pleasure in doing things 1    2.  Feeling down, depressed, or hopeless 1    3.  Trouble falling or staying asleep, or sleeping too much 1    4.  Feeling tired or having little energy 2    5.  Poor appetite or overeating 1    6.  Feeling bad about yourself 1    7.  Trouble concentrating 1    8.  Moving slowly or restless 0    Q9: Thoughts of better off dead/self-harm past 2 weeks 0    PHQ-9 Total Score 8        Patient-reported         1/22/2022     1:04 PM   FABIAN-7    1. Feeling nervous, anxious, or on edge 1    2. Not being able to stop or control worrying 1    3. Worrying too much about different things 1    4. Trouble relaxing 1    5. Being so restless that it is hard to sit still 1    6. Becoming easily annoyed or irritable 2    7. Feeling afraid, as if something awful might happen 0    FABIAN-7 Total Score 7       Patient-reported       Health Care Directive  Patient does not have a Health Care Directive: Discussed advance care planning with patient; however, patient declined at this time.      12/3/2024   General Health   How would you rate your overall physical health? Good   Feel stress (tense, anxious, or unable to sleep) To some extent      (!) STRESS CONCERN      12/3/2024   Nutrition   Three or more servings of calcium each day? Yes   Diet: Breakfast skipped   How many servings of fruit and vegetables per day? (!) 0-1   How many sweetened beverages each day? 0-1            12/3/2024   Exercise   Days per week of moderate/strenous exercise 2 days      (!) EXERCISE CONCERN        12/3/2024   Social Factors   Frequency of gathering with friends or relatives Never   Worry food won't last until get money to buy more No   Food not last or not have enough money for food? No   Do you have housing? (Housing is defined as stable permanent housing and does not include staying ouside in a car, in a tent, in an abandoned building, in  an overnight shelter, or couch-surfing.) YES   Are you worried about losing your housing? No   Lack of transportation? No   Unable to get utilities (heat,electricity)? No   Want help with housing or utility concern? No            12/3/2024   Dental   Dentist two times every year? Yes            12/3/2024   TB Screening   Were you born outside of the [place]? No          Today's PHQ-9 Score:       12/3/2024     6:52 AM   PHQ-9 SCORE   PHQ-9 Total Score MyChart 8 (Mild depression)   PHQ-9 Total Score 8        Patient-reported         12/3/2024   Substance Use   Alcohol more than 3/day or more than 7/wk Yes   How often do you have a drink containing alcohol 2 to 3 times a week   How many alcohol drinks on typical day 5 or 6   How often do you have 5+ drinks at one occasion Weekly   Audit 2/3 Score 5   How often not able to stop drinking once started Never   How often failed to do what normally expected Never   How often needed first drink in am after a heavy drinking session Never   How often feeling of guilt or remorse after drinking Less than monthly   How often unable to remember what happened the night before Never   Have you or someone else been injured because of your drinking No   Has anyone been concerned or suggested you cut down on drinking Yes, during the last year   TOTAL SCORE - AUDIT 13   Do you use any other substances recreationally? No        Social History     Tobacco Use    Smoking status: Never     Passive exposure: Never    Smokeless tobacco: Never   Vaping Use    Vaping status: Never Used   Substance Use Topics    Alcohol use: Yes     Alcohol/week: 6.0 - 12.0 standard drinks of alcohol     Types: 6 - 12 Standard drinks or equivalent per week    Drug use: Yes     Types: Marijuana     Comment: marijuana - weekly           12/3/2024   STI Screening   New sexual partner(s) since last STI/HIV test? 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       Objective    Exam  /80   Pulse 78   Temp 97.9  F (36.6  C) (Tympanic)   Resp 12   Ht 1.753 m (5' 9\")   Wt 96.6 kg (213 lb)   SpO2 99%   BMI 31.45 kg/m     Estimated body mass index is 31.45 kg/m  as calculated from the following:    Height as of this encounter: 1.753 m (5' 9\").    Weight as of this encounter: 96.6 kg (213 lb).    Physical Exam  GENERAL: alert and no distress  EYES: Eyes grossly normal to inspection  HENT: ear canals and TM's normal, nose and mouth without ulcers or lesions  NECK: no adenopathy, no asymmetry, masses, or scars  RESP: lungs clear to auscultation - no rales, rhonchi or wheezes  CV: regular rates and rhythm, normal S1 S2, no S3 or S4, and no murmur, click or rub  ABDOMEN: soft, nontender, without hepatosplenomegaly or masses  MS: no gross musculoskeletal defects noted, no edema  SKIN: no suspicious lesions or rashes  PSYCH: mentation appears normal, affect normal/bright        Signed Electronically by: Mark Riojas DO  "

## 2024-12-05 LAB — TESTOST SERPL-MCNC: 289 NG/DL (ref 240–950)

## 2024-12-26 DIAGNOSIS — E78.5 HYPERLIPIDEMIA LDL GOAL <130: ICD-10-CM

## 2024-12-26 RX ORDER — ROSUVASTATIN CALCIUM 20 MG/1
20 TABLET, COATED ORAL DAILY
Qty: 90 TABLET | Refills: 0 | Status: SHIPPED | OUTPATIENT
Start: 2024-12-26

## 2024-12-27 ENCOUNTER — MYC MEDICAL ADVICE (OUTPATIENT)
Dept: FAMILY MEDICINE | Facility: CLINIC | Age: 39
End: 2024-12-27
Payer: COMMERCIAL

## 2024-12-27 DIAGNOSIS — L90.5 SCAR TISSUE: Primary | ICD-10-CM

## 2024-12-30 NOTE — TELEPHONE ENCOUNTER
LOV 12/3/2024    Please see my chart message below     Please review and advise     Thank you     Stephanie Drew RN, BSN  [place] [facility]

## 2025-01-07 DIAGNOSIS — M54.41 LOW BACK PAIN WITH RIGHT-SIDED SCIATICA, UNSPECIFIED BACK PAIN LATERALITY, UNSPECIFIED CHRONICITY: ICD-10-CM

## 2025-01-08 RX ORDER — CYCLOBENZAPRINE HCL 10 MG
TABLET ORAL
Qty: 30 TABLET | Refills: 0 | Status: SHIPPED | OUTPATIENT
Start: 2025-01-08

## 2025-02-27 ENCOUNTER — MYC MEDICAL ADVICE (OUTPATIENT)
Dept: FAMILY MEDICINE | Facility: CLINIC | Age: 40
End: 2025-02-27
Payer: COMMERCIAL

## 2025-02-27 DIAGNOSIS — M54.41 LOW BACK PAIN WITH RIGHT-SIDED SCIATICA, UNSPECIFIED BACK PAIN LATERALITY, UNSPECIFIED CHRONICITY: Primary | ICD-10-CM

## 2025-02-27 NOTE — TELEPHONE ENCOUNTER
Last office visit - 12/3/2024    Please see patient's mychart message.     Please advise, thanks.    Renita Prabhakar RN [place]    5:58 PM 2/27/2025

## 2025-03-02 RX ORDER — METHOCARBAMOL 500 MG/1
500 TABLET, FILM COATED ORAL 3 TIMES DAILY PRN
Qty: 30 TABLET | Refills: 0 | Status: SHIPPED | OUTPATIENT
Start: 2025-03-02

## 2025-03-06 DIAGNOSIS — E66.811 CLASS 1 OBESITY DUE TO EXCESS CALORIES WITHOUT SERIOUS COMORBIDITY WITH BODY MASS INDEX (BMI) OF 31.0 TO 31.9 IN ADULT: ICD-10-CM

## 2025-03-06 DIAGNOSIS — E66.09 CLASS 1 OBESITY DUE TO EXCESS CALORIES WITHOUT SERIOUS COMORBIDITY WITH BODY MASS INDEX (BMI) OF 31.0 TO 31.9 IN ADULT: ICD-10-CM

## 2025-03-06 RX ORDER — SEMAGLUTIDE 1 MG/.5ML
INJECTION, SOLUTION SUBCUTANEOUS
Qty: 2 ML | Refills: 0 | Status: SHIPPED | OUTPATIENT
Start: 2025-03-06

## 2025-03-31 DIAGNOSIS — E66.811 CLASS 1 OBESITY DUE TO EXCESS CALORIES WITHOUT SERIOUS COMORBIDITY WITH BODY MASS INDEX (BMI) OF 31.0 TO 31.9 IN ADULT: ICD-10-CM

## 2025-03-31 DIAGNOSIS — E66.09 CLASS 1 OBESITY DUE TO EXCESS CALORIES WITHOUT SERIOUS COMORBIDITY WITH BODY MASS INDEX (BMI) OF 31.0 TO 31.9 IN ADULT: ICD-10-CM

## 2025-03-31 RX ORDER — SEMAGLUTIDE 1 MG/.5ML
INJECTION, SOLUTION SUBCUTANEOUS
Qty: 2 ML | Refills: 0 | Status: SHIPPED | OUTPATIENT
Start: 2025-03-31

## 2025-04-17 ENCOUNTER — VIRTUAL VISIT (OUTPATIENT)
Dept: FAMILY MEDICINE | Facility: CLINIC | Age: 40
End: 2025-04-17
Payer: COMMERCIAL

## 2025-04-17 DIAGNOSIS — F33.0 MAJOR DEPRESSIVE DISORDER, RECURRENT EPISODE, MILD: Primary | ICD-10-CM

## 2025-04-17 PROCEDURE — 98005 SYNCH AUDIO-VIDEO EST LOW 20: CPT | Performed by: FAMILY MEDICINE

## 2025-04-17 ASSESSMENT — PATIENT HEALTH QUESTIONNAIRE - PHQ9
SUM OF ALL RESPONSES TO PHQ QUESTIONS 1-9: 6
SUM OF ALL RESPONSES TO PHQ QUESTIONS 1-9: 6
10. IF YOU CHECKED OFF ANY PROBLEMS, HOW DIFFICULT HAVE THESE PROBLEMS MADE IT FOR YOU TO DO YOUR WORK, TAKE CARE OF THINGS AT HOME, OR GET ALONG WITH OTHER PEOPLE: SOMEWHAT DIFFICULT

## 2025-04-17 ASSESSMENT — ANXIETY QUESTIONNAIRES
7. FEELING AFRAID AS IF SOMETHING AWFUL MIGHT HAPPEN: NOT AT ALL
IF YOU CHECKED OFF ANY PROBLEMS ON THIS QUESTIONNAIRE, HOW DIFFICULT HAVE THESE PROBLEMS MADE IT FOR YOU TO DO YOUR WORK, TAKE CARE OF THINGS AT HOME, OR GET ALONG WITH OTHER PEOPLE: SOMEWHAT DIFFICULT
GAD7 TOTAL SCORE: 6
8. IF YOU CHECKED OFF ANY PROBLEMS, HOW DIFFICULT HAVE THESE MADE IT FOR YOU TO DO YOUR WORK, TAKE CARE OF THINGS AT HOME, OR GET ALONG WITH OTHER PEOPLE?: SOMEWHAT DIFFICULT
2. NOT BEING ABLE TO STOP OR CONTROL WORRYING: SEVERAL DAYS
GAD7 TOTAL SCORE: 6
4. TROUBLE RELAXING: SEVERAL DAYS
7. FEELING AFRAID AS IF SOMETHING AWFUL MIGHT HAPPEN: NOT AT ALL
1. FEELING NERVOUS, ANXIOUS, OR ON EDGE: SEVERAL DAYS
5. BEING SO RESTLESS THAT IT IS HARD TO SIT STILL: NOT AT ALL
GAD7 TOTAL SCORE: 6
6. BECOMING EASILY ANNOYED OR IRRITABLE: MORE THAN HALF THE DAYS
3. WORRYING TOO MUCH ABOUT DIFFERENT THINGS: SEVERAL DAYS

## 2025-04-17 NOTE — PROGRESS NOTES
Agapito is a 39 year old who is being evaluated via a billable video visit.    How would you like to obtain your AVS? MyChart  If the video visit is dropped, the invitation should be resent by: Text to cell phone: [phone]  Will anyone else be joining your video visit? No  {If patient encounters technical issues they should call [phone] :758965}    {PROVIDER CHARTING PREFERENCE:866311}    Trial off for one month and then he'll let me know how he is doing in 1-2 months. Consider Effexor or Lexapro.     Subjective   Agapito is a 39 year old, presenting for the following health issues:  Video Visit    History of Present Illness       Mental Health Follow-up:  Patient presents to follow-up on Depression & Anxiety.Patient's depression since last visit has been:  No change  The patient is not having other symptoms associated with depression.  Patient's anxiety since last visit has been:  Medium  The patient is not having other symptoms associated with anxiety.  Any significant life events: No  Patient is not feeling anxious or having panic attacks.  Patient has no concerns about alcohol or drug use.         Sees HR inlow 100s even at rest            Social History     Tobacco Use    Smoking status: Never     Passive exposure: Never    Smokeless tobacco: Never   Vaping Use    Vaping status: Never Used   Substance Use Topics    Alcohol use: Yes     Alcohol/week: 6.0 - 12.0 standard drinks of alcohol     Types: 6 - 12 Standard drinks or equivalent per week    Drug use: Yes     Types: Marijuana     Comment: marijuana - weekly         12/15/2023     3:37 PM 12/3/2024     6:52 AM 4/17/2025     1:27 PM   PHQ   PHQ-9 Total Score 5 8  6    Q9: Thoughts of better off dead/self-harm past 2 weeks Not at all Not at all Not at all       Patient-reported         1/28/2021    11:21 AM 1/22/2022     1:04 PM 4/17/2025     1:27 PM   FABIAN-7 SCORE   Total Score  7 (mild anxiety) 6 (mild anxiety)   Total Score 5 7 6        Patient-reported  "        4/17/2025     1:27 PM   Last PHQ-9   1.  Little interest or pleasure in doing things 1   2.  Feeling down, depressed, or hopeless 2   3.  Trouble falling or staying asleep, or sleeping too much 1   4.  Feeling tired or having little energy 1   5.  Poor appetite or overeating 0   6.  Feeling bad about yourself 1   7.  Trouble concentrating 0   8.  Moving slowly or restless 0   Q9: Thoughts of better off dead/self-harm past 2 weeks 0   PHQ-9 Total Score 6        Patient-reported         4/17/2025     1:27 PM   FABIAN-7    1. Feeling nervous, anxious, or on edge 1   2. Not being able to stop or control worrying 1   3. Worrying too much about different things 1   4. Trouble relaxing 1   5. Being so restless that it is hard to sit still 0   6. Becoming easily annoyed or irritable 2   7. Feeling afraid, as if something awful might happen 0   FABIAN-7 Total Score 6    If you checked any problems, how difficult have they made it for you to do your work, take care of things at home, or get along with other people? Somewhat difficult       Patient-reported       Suicide Assessment Five-step Evaluation and Treatment (SAFE-T)  {Provider  Link to Depression Care Package SmartSet :708925}    {additonal problems for provider to add (Optional):890814}    {ROS Picklists (Optional):797454}      Objective           Vitals:  No vitals were obtained today due to virtual visit.    Physical Exam   {video visit exam brief selected:672687}    {Diagnostic Test Results (Optional):135904}      Video-Visit Details    Type of service:  Video Visit   Originating Location (pt. Location): {video visit patient location:220418::\"Home\"}  {PROVIDER LOCATION On-site should be selected for visits conducted from your clinic location or adjoining [facility] hospital, academic office, or other nearby [facility] building. Off-site should be selected for all other provider locations, including home:354310}  Distant Location (provider location):  {virtual location " "provider:638481}  Platform used for Video Visit: {Virtual Visit Platforms:372301::\"PointAcross\"}  Signed Electronically by: Mark Riojas DO  {Email feedback regarding this note to [email or web address]   :666593}  "

## 2025-04-26 ENCOUNTER — MYC MEDICAL ADVICE (OUTPATIENT)
Dept: FAMILY MEDICINE | Facility: CLINIC | Age: 40
End: 2025-04-26
Payer: COMMERCIAL

## 2025-04-28 NOTE — TELEPHONE ENCOUNTER
LOV 4/17/2025    Please see my chart message below     Please review and advise     Thank you     Stephanie Drew RN, BSN  [place] [facility]

## 2025-04-28 NOTE — TELEPHONE ENCOUNTER
We could try increasing his metoprolol and see how it affects heart rate and his blood pressure. Okay to take 2 of the 50 mg tablets. He should closely monitor BP and we may need to adjust the losartan.    Mark Riojas,   4/28/2025 12:19 PM

## 2025-04-29 ENCOUNTER — MYC MEDICAL ADVICE (OUTPATIENT)
Dept: CARDIOLOGY | Facility: CLINIC | Age: 40
End: 2025-04-29
Payer: COMMERCIAL

## 2025-04-29 DIAGNOSIS — F33.0 MAJOR DEPRESSIVE DISORDER, RECURRENT EPISODE, MILD: ICD-10-CM

## 2025-04-29 DIAGNOSIS — E78.5 HYPERLIPIDEMIA LDL GOAL <130: ICD-10-CM

## 2025-04-30 RX ORDER — ROSUVASTATIN CALCIUM 20 MG/1
20 TABLET, COATED ORAL DAILY
Qty: 90 TABLET | Refills: 0 | Status: SHIPPED | OUTPATIENT
Start: 2025-04-30

## 2025-04-30 NOTE — TELEPHONE ENCOUNTER
[facility] Cardiology Refill Guideline reviewed.  Patient is overdue for annual follow up. Medication filled for 90 days and mychart message returned to patient with need for appointment.  Nedra Peacock RN on 4/30/2025 at 8:43 AM

## 2025-05-01 RX ORDER — BUPROPION HYDROCHLORIDE 150 MG/1
150 TABLET ORAL EVERY MORNING
Qty: 90 TABLET | Refills: 1 | Status: SHIPPED | OUTPATIENT
Start: 2025-05-01

## 2025-06-20 ENCOUNTER — MYC MEDICAL ADVICE (OUTPATIENT)
Dept: FAMILY MEDICINE | Facility: CLINIC | Age: 40
End: 2025-06-20
Payer: COMMERCIAL

## 2025-06-20 DIAGNOSIS — I42.8 NONISCHEMIC CARDIOMYOPATHY (H): ICD-10-CM

## 2025-06-23 RX ORDER — METOPROLOL SUCCINATE 100 MG/1
100 TABLET, EXTENDED RELEASE ORAL DAILY
Qty: 90 TABLET | Refills: 3 | Status: SHIPPED | OUTPATIENT
Start: 2025-06-23

## 2025-06-23 NOTE — TELEPHONE ENCOUNTER
[place] message from 4/26 reviewed where metoprolol dose was increased to 100mg. Last visit 4/17/25. Refill provided.     Ayse Hutton PA-C

## 2025-06-23 NOTE — TELEPHONE ENCOUNTER
LOV 4/17/25    Pended 100 mg ER    Routing to provider to review and advise.     Diane Thacker RN   [facility]

## 2025-08-04 DIAGNOSIS — I42.8 NONISCHEMIC CARDIOMYOPATHY (H): ICD-10-CM

## 2025-08-04 DIAGNOSIS — I51.9 LV DYSFUNCTION: Primary | ICD-10-CM

## 2025-08-04 DIAGNOSIS — I42.9 SECONDARY CARDIOMYOPATHY (H): ICD-10-CM

## 2025-08-06 DIAGNOSIS — E78.5 HYPERLIPIDEMIA LDL GOAL <130: ICD-10-CM

## 2025-08-06 RX ORDER — ROSUVASTATIN CALCIUM 20 MG/1
20 TABLET, COATED ORAL DAILY
Qty: 90 TABLET | Refills: 0 | Status: SHIPPED | OUTPATIENT
Start: 2025-08-06

## (undated) RX ORDER — METOPROLOL TARTRATE 1 MG/ML
INJECTION, SOLUTION INTRAVENOUS
Status: DISPENSED
Start: 2024-07-29

## (undated) RX ORDER — REGADENOSON 0.08 MG/ML
INJECTION, SOLUTION INTRAVENOUS
Status: DISPENSED
Start: 2017-12-14

## (undated) RX ORDER — IVABRADINE 5 MG/1
TABLET, FILM COATED ORAL
Status: DISPENSED
Start: 2024-07-29

## (undated) RX ORDER — AMINOPHYLLINE 25 MG/ML
INJECTION, SOLUTION INTRAVENOUS
Status: DISPENSED
Start: 2017-12-14

## (undated) RX ORDER — METOPROLOL TARTRATE 50 MG
TABLET ORAL
Status: DISPENSED
Start: 2024-07-29